# Patient Record
Sex: FEMALE | Race: WHITE | NOT HISPANIC OR LATINO | Employment: OTHER | URBAN - METROPOLITAN AREA
[De-identification: names, ages, dates, MRNs, and addresses within clinical notes are randomized per-mention and may not be internally consistent; named-entity substitution may affect disease eponyms.]

---

## 2017-04-27 ENCOUNTER — TELEMEDICINE2 (OUTPATIENT)
Dept: MEDICAL GROUP | Age: 72
End: 2017-04-27
Payer: MEDICARE

## 2017-04-27 ENCOUNTER — TELEMEDICINE ORIGINATING SITE VISIT (OUTPATIENT)
Dept: MEDICAL GROUP | Facility: CLINIC | Age: 72
End: 2017-04-27
Payer: MEDICARE

## 2017-04-27 ENCOUNTER — APPOINTMENT (OUTPATIENT)
Dept: RADIOLOGY | Facility: IMAGING CENTER | Age: 72
End: 2017-04-27
Attending: INTERNAL MEDICINE
Payer: MEDICARE

## 2017-04-27 VITALS
HEIGHT: 66 IN | HEART RATE: 62 BPM | WEIGHT: 166.7 LBS | RESPIRATION RATE: 16 BRPM | OXYGEN SATURATION: 88 % | DIASTOLIC BLOOD PRESSURE: 77 MMHG | BODY MASS INDEX: 26.79 KG/M2 | SYSTOLIC BLOOD PRESSURE: 134 MMHG | TEMPERATURE: 99.9 F

## 2017-04-27 DIAGNOSIS — E11.9 CONTROLLED TYPE 2 DIABETES MELLITUS WITHOUT COMPLICATION, WITHOUT LONG-TERM CURRENT USE OF INSULIN (HCC): ICD-10-CM

## 2017-04-27 DIAGNOSIS — Z72.0 TOBACCO ABUSE: ICD-10-CM

## 2017-04-27 DIAGNOSIS — R09.02 HYPOXEMIA: ICD-10-CM

## 2017-04-27 DIAGNOSIS — F32.9 REACTIVE DEPRESSION: ICD-10-CM

## 2017-04-27 DIAGNOSIS — I10 ESSENTIAL HYPERTENSION: ICD-10-CM

## 2017-04-27 DIAGNOSIS — I25.10 CORONARY ARTERY DISEASE INVOLVING NATIVE CORONARY ARTERY OF NATIVE HEART WITHOUT ANGINA PECTORIS: ICD-10-CM

## 2017-04-27 DIAGNOSIS — E78.5 DYSLIPIDEMIA: ICD-10-CM

## 2017-04-27 DIAGNOSIS — I73.9 PAD (PERIPHERAL ARTERY DISEASE) (HCC): ICD-10-CM

## 2017-04-27 PROCEDURE — 71020 DX-CHEST-2 VIEWS: CPT | Mod: TC | Performed by: FAMILY MEDICINE

## 2017-04-27 PROCEDURE — 99204 OFFICE O/P NEW MOD 45 MIN: CPT | Mod: GT | Performed by: INTERNAL MEDICINE

## 2017-04-27 RX ORDER — PIOGLITAZONEHYDROCHLORIDE 45 MG/1
45 TABLET ORAL DAILY
Qty: 30 TAB | Refills: 11 | Status: SHIPPED | OUTPATIENT
Start: 2017-04-27 | End: 2018-01-29 | Stop reason: SDUPTHER

## 2017-04-27 RX ORDER — CLOPIDOGREL BISULFATE 75 MG/1
TABLET ORAL
COMMUNITY
Start: 2017-04-10 | End: 2017-07-11 | Stop reason: SDUPTHER

## 2017-04-27 RX ORDER — FLUOXETINE HYDROCHLORIDE 20 MG/1
CAPSULE ORAL
COMMUNITY
Start: 2017-04-10 | End: 2018-01-29 | Stop reason: SDUPTHER

## 2017-04-27 RX ORDER — GLIPIZIDE 10 MG/1
TABLET ORAL
COMMUNITY
Start: 2017-04-10 | End: 2017-06-12 | Stop reason: SDUPTHER

## 2017-04-27 RX ORDER — PIOGLITAZONEHYDROCHLORIDE 30 MG/1
TABLET ORAL
COMMUNITY
Start: 2017-04-10 | End: 2019-06-03

## 2017-04-27 RX ORDER — BENAZEPRIL HYDROCHLORIDE 40 MG/1
TABLET ORAL
COMMUNITY
Start: 2017-04-10 | End: 2018-01-29 | Stop reason: SDUPTHER

## 2017-04-27 RX ORDER — IBUPROFEN 800 MG/1
TABLET ORAL
COMMUNITY
Start: 2017-02-28 | End: 2019-06-03

## 2017-04-27 RX ORDER — CANAGLIFLOZIN 300 MG/1
TABLET, FILM COATED ORAL
COMMUNITY
Start: 2017-04-10 | End: 2017-10-18 | Stop reason: SDUPTHER

## 2017-04-27 RX ORDER — ASPIRIN 81 MG/1
81 TABLET, CHEWABLE ORAL DAILY
COMMUNITY
End: 2021-07-15

## 2017-04-27 RX ORDER — SIMVASTATIN 20 MG
TABLET ORAL
COMMUNITY
Start: 2017-04-10 | End: 2017-07-11 | Stop reason: SDUPTHER

## 2017-04-27 ASSESSMENT — ENCOUNTER SYMPTOMS
WEIGHT LOSS: 1
CARDIOVASCULAR NEGATIVE: 1
EYES NEGATIVE: 1
GASTROINTESTINAL NEGATIVE: 1
RESPIRATORY NEGATIVE: 1
MUSCULOSKELETAL NEGATIVE: 1
NEUROLOGICAL NEGATIVE: 1
PSYCHIATRIC NEGATIVE: 1

## 2017-04-27 ASSESSMENT — PATIENT HEALTH QUESTIONNAIRE - PHQ9: CLINICAL INTERPRETATION OF PHQ2 SCORE: 0

## 2017-04-27 NOTE — PROGRESS NOTES
"Subjective:      Kimberly Castañeda is a 71 y.o. female who presents with Establish Care            HPI 71-year-old female here today to establish medical care.  Previous PCP Dr. Sood in [place]     1. Hypertension. Patient currently taking Lotensin and Lopressor. Patient has been stable on these medications for many years.    2. Dyslipidemia. Currently taking Zocor 20 mg daily. Labs from March 2 zero 17, total cholesterol 183, triglyceride 82, HDL 73, LDL 93    3. Diabetes mellitus 2. Patient is currently being treated with oral medications to include Actos, Glucophage, glipizide, Invokana. No recent medication adjustments. Hemoglobin A1c 8.6 from March 2017. Patient is trying to follow a diabetic diet. Most recent diabetic eye exam was 6 months ago which was negative for retinopathy. Patient declines treatment with insulin    4. Coronary artery disease. Status post stent placement about 10 years ago in [place]. Currently not followed by cardiology. Patient denies chest pain, shortness of breath, headaches, dizziness, orthopnea or edema. Patient stable on beta blocker, aspirin and Plavix    5. PAD. Diagnosed by PCP. Patient is treated with Plavix    6. Depression. Stable with Prozac. Patient was started on Prozac for weight loss initially to get motivated. Patient starting weight was 250 pounds 2 years ago and now she is 166 pounds.      Review of Systems   Constitutional: Positive for weight loss.        Intentional 100 pound weight loss in 2 years   HENT: Negative.    Eyes: Negative.    Respiratory: Negative.    Cardiovascular: Negative.    Gastrointestinal: Negative.    Genitourinary: Negative.    Musculoskeletal: Negative.    Neurological: Negative.    Psychiatric/Behavioral: Negative.           Objective:     /77 mmHg  Pulse 62  Temp(Src) 37.7 °C (99.9 °F)  Resp 16  Ht 1.676 m (5' 5.98\")  Wt 75.615 kg (166 lb 11.2 oz)  BMI 26.92 kg/m2  SpO2 88%     Physical Exam   Constitutional: She appears " well-developed and well-nourished.   HENT:   Head: Normocephalic and atraumatic.   Mouth/Throat: Oropharynx is clear and moist.   Eyes: Conjunctivae and EOM are normal. Pupils are equal, round, and reactive to light. No scleral icterus.   Neck: Normal range of motion. Neck supple. No thyromegaly present.   Cardiovascular: Normal rate and regular rhythm.    Murmur heard.  Chronic murmur   Pulmonary/Chest: Effort normal and breath sounds normal. No respiratory distress. She has no wheezes. She has no rales.   Abdominal: Soft. Bowel sounds are normal. She exhibits no distension.   Musculoskeletal: Normal range of motion. She exhibits no edema.   Lymphadenopathy:     She has no cervical adenopathy.   Neurological: She is alert.   Skin: Skin is warm and dry.   Psychiatric: She has a normal mood and affect.   Nursing note and vitals reviewed.    Microfilament test. Sensation intact bilaterally          Assessment/Plan:     1. Essential hypertension  Pressure stable. Continue Lotensin and Lopressor at current dose    2. Dyslipidemia  Stable. Up-to-date with lipid panel. Continue Zocor 20 mg daily. Liver function tests within normal limits    3. Controlled type 2 diabetes mellitus without complication, without long-term current use of insulin (CMS-HCC)  Hemoglobin A1c 8.6  Increase Actos to 45 mg daily. Continue all other oral medications as prescribed   Check blood sugar 1-2 times a day  Microfilament test. Sensation intact bilaterally  Patient declines insulin    - pioglitazone (ACTOS) 45 MG Tab; Take 1 Tab by mouth every day.  Dispense: 30 Tab; Refill: 11  - REFERRAL TO DIABETIC EDUCATION Diabetes Self Management Education / Training (DSME/T) and Medical Nutrition Therapy (MNT): Initial Group DSME/MNT as authorized by payor; DSME/T Content: Nutritional Management, Physical Activity    4. Coronary artery disease involving native coronary artery of native heart without angina pectoris  Stable. Continue current plan of  care    5. Reactive depression  Stable. Continue fluoxetine    6. PAD (peripheral artery disease) (CMS-[facility])  Workup reviewed. Continue aspirin and Plavix    7. Hypoxemia  Patient using 2 L of oxygen at nighttime.  Discussed the use of inhalers  Recommend smoking cessation    - PULMONARY FUNCTION TESTS Test requested: Spirometry, simple  - DX-CHEST-2 VIEWS; Future    8. Tobacco abuse   Tobacco user for many years, smoking about half pack of cigarettes a day  Offered counseling on behavioral modifications, readdress upon follow-up    - PULMONARY FUNCTION TESTS Test requested: Spirometry, simple

## 2017-04-27 NOTE — MR AVS SNAPSHOT
"        Kimberly Castañeda   2017 10:00 AM   Telemedicine2   MRN: [id number]    Department:  [address] [place] [address]   Dept Phone:  [phone]    Description:  Female : 1945   Provider:  Amaya Ibarra M.D.; [employer] [facility]           Reason for Visit     Establish Care           Allergies as of 2017     Allergen Noted Reactions    Aldactone [Kdc:Yellow Dye+Spironolactone] 2017   Rash    Full body    Percocet [Apap-Fd&C Red #40 [place]-Oxycodone] 2017   Rash    Full body      You were diagnosed with     Essential hypertension   [0356298]       Dyslipidemia   [478942]       Controlled type 2 diabetes mellitus without complication, without long-term current use of insulin (CMS-[facility])   [7649490]       Coronary artery disease involving native coronary artery of native heart without angina pectoris   [4078649]       Reactive depression   [007792]       PAD (peripheral artery disease) (CMS-[facility])   [296763]       Hypoxemia   [799.02.ICD-9-CM]       Tobacco abuse   [700461]         Vital Signs     Blood Pressure Pulse Temperature Respirations Height Weight    134/77 mmHg 62 37.7 °C (99.9 °F) 16 1.676 m (5' 5.98\") 75.615 kg (166 lb 11.2 oz)    Body Mass Index Oxygen Saturation Smoking Status             26.92 kg/m2 88% Current Every Day Smoker         Basic Information     Date Of Birth Sex Race Ethnicity Preferred Language    1945 Female White Non- English      Your appointments     2017 10:00 AM   Telemedicine Clinic Established Pt with Amaya Ibarra M.D., [employer] [address])    [address] [place] [id number]   [phone]              Problem List              ICD-10-CM Priority Class Noted - Resolved    Essential hypertension I10   2017 - Present    Dyslipidemia E78.5   2017 - Present    Controlled type 2 diabetes mellitus without complication (CMS-[facility]) E11.9   2017 - Present    Coronary artery disease involving " native coronary artery of native heart without angina pectoris I25.10   4/27/2017 - Present    Reactive depression F32.9   4/27/2017 - Present    PAD (peripheral artery disease) (CMS-[facility]) I73.9   4/27/2017 - Present    Hypoxemia R09.02   4/27/2017 - Present    Tobacco abuse Z72.0   4/27/2017 - Present      Health Maintenance     Patient has no pending health maintenance at this time      Current Immunizations     No immunizations on file.      Below and/or attached are the medications your provider expects you to take. Review all of your home medications and newly ordered medications with your provider and/or pharmacist. Follow medication instructions as directed by your provider and/or pharmacist. Please keep your medication list with you and share with your provider. Update the information when medications are discontinued, doses are changed, or new medications (including over-the-counter products) are added; and carry medication information at all times in the event of emergency situations     Allergies:  ALDACTONE - Rash     PERCOCET - Rash               Medications  Valid as of: April 27, 2017 - 10:45 AM    Generic Name Brand Name Tablet Size Instructions for use    Aspirin (Chew Tab) ASA 81 MG Take 81 mg by mouth every day.        Benazepril HCl (Tab) LOTENSIN 40 MG         Canagliflozin (Tab) INVOKANA 300 MG         Clopidogrel Bisulfate (Tab) PLAVIX 75 MG         FLUoxetine HCl (Cap) PROZAC 20 MG         GlipiZIDE (Tab) GLUCOTROL 10 MG         Ibuprofen (Tab) MOTRIN 800 MG         MetFORMIN HCl (Tab) GLUCOPHAGE 1000 MG         Metoprolol Tartrate (Tab) LOPRESSOR 25 MG         Pioglitazone HCl (Tab) ACTOS 30 MG         Simvastatin (Tab) ZOCOR 20 MG         .                 Medicines prescribed today were sent to:     [id number] [place] [address]    [address] [place] [address]    Phone: [phone] Fax: [phone]    Open 24 Hours?: No      Medication refill instructions:          If your prescription bottle indicates you have medication refills left, it is not necessary to call your provider’s office. Please contact your pharmacy and they will refill your medication.    If your prescription bottle indicates you do not have any refills left, you may request refills at any time through one of the following ways: The online [email or web address] system (except Urgent Care), by calling your provider’s office, or by asking your pharmacy to contact your provider’s office with a refill request. Medication refills are processed only during regular business hours and may not be available until the next business day. Your provider may request additional information or to have a follow-up visit with you prior to refilling your medication.   *Please Note: Medication refills are assigned a new Rx number when refilled electronically. Your pharmacy may indicate that no refills were authorized even though a new prescription for the same medication is available at the pharmacy. Please request the medicine by name with the pharmacy before contacting your provider for a refill.        Your To Do List     Future Labs/Procedures Complete By Expires    DX-CHEST-2 VIEWS  As directed 10/28/2017         [email or web address] Access Code: [email or web address]  Expires: 5/27/2017 10:45 AM    Your email address is not on file at [employer].  Email Addresses are required for you to sign up for [email or web address], please contact [phone] to verify your personal information and to provide your email address prior to attempting to register for [email or web address].    Kimberly Castañeda  [address]  [place] [address]    [email or web address]  A secure, online tool to manage your health information     [employer]’s [email or web address]® is a secure, online tool that connects you to your personalized health information from the privacy of your home -- day or night - making it very easy for you to manage your healthcare. Once the activation process is completed, you can even access your medical  information using the Aginova trini, which is available for free in the Apple Trini store or Google Play store.     To learn more about Aginova, visit [email or web address]    There are two levels of access available (as shown below):   My Chart Features  [facility] Doctor [facility]  Specialists [facility]  Urgent  Care Non-[facility] Primary Care Doctor   Email your healthcare team securely and privately 24/7 X X X    Manage appointments: schedule your next appointment; view details of past/upcoming appointments X      Request prescription refills. X      View recent personal medical records, including lab and immunizations X X X X   View health record, including health history, allergies, medications X X X X   Read reports about your outpatient visits, procedures, consult and ER notes X X X X   See your discharge summary, which is a recap of your hospital and/or ER visit that includes your diagnosis, lab results, and care plan X X  X     How to register for Aginova:  Once your e-mail address has been verified, follow the following steps to sign up for Aginova.     1. Go to  [email or web address]  2. Click on the Sign Up Now box, which takes you to the New Member Sign Up page. You will need to provide the following information:  a. Enter your Aginova Access Code exactly as it appears at the top of this page. (You will not need to use this code after you’ve completed the sign-up process. If you do not sign up before the expiration date, you must request a new code.)   b. Enter your date of birth.   c. Enter your home email address.   d. Click Submit, and follow the next screen’s instructions.  3. Create a Aginova ID. This will be your Aginova login ID and cannot be changed, so think of one that is secure and easy to remember.  4. Create a Aginova password. You can change your password at any time.  5. Enter your Password Reset Question and Answer. This can be used at a later time if you forget your password.    6. Enter your e-mail address. This allows you to receive e-mail notifications when new information is available in [email or web address].  7. Click Sign Up. You can now view your health information.    For assistance activating your [email or web address] account, call [phone]         Quit Tobacco Information     Do you want to quit using tobacco?    Quitting tobacco decreases risks of cancer, heart and lung disease, increases life expectancy, improves sense of taste and smell, and increases spending money, among other benefits.    If you are thinking about quitting, we can help.  • [facility] Quit Tobacco Program: 528.134.1345  o Program occurs weekly for four weeks and includes pharmacist consultation on products to support quitting smoking or chewing tobacco. A provider referral is needed for pharmacist consultation.  • Tobacco Users Help Hotline: 800QUIT-NOW (121-4393) or https://[place].quitlogix.org/  o Free, confidential telephone and online coaching for [place] residents. Sessions are designed on a schedule that is convenient for you. Eligible clients receive free nicotine replacement therapy.  • Nationally: www.smokefree.gov  o Information and professional assistance to support both immediate and long-term needs as you become, and remain, a non-smoker. Smokefree.gov allows you to choose the help that best fits your needs.

## 2017-05-01 ENCOUNTER — NON-PROVIDER VISIT (OUTPATIENT)
Dept: MEDICAL GROUP | Facility: CLINIC | Age: 72
End: 2017-05-01
Payer: MEDICARE

## 2017-05-01 DIAGNOSIS — R09.02 HYPOXEMIA: ICD-10-CM

## 2017-05-01 PROCEDURE — 94010 BREATHING CAPACITY TEST: CPT | Mod: TC | Performed by: FAMILY MEDICINE

## 2017-05-01 NOTE — PROGRESS NOTES
Kimberly Castañeda is a 71 y.o. female here for a non-provider visit for PFT    If abnormal was an in office provider notified today (if so, indicate provider)? Yes  Routed to PCP? Yes

## 2017-05-01 NOTE — MR AVS SNAPSHOT
Kimberly Castañeda   2017 8:00 AM   Non-Provider Visit   MRN: [id number]    Department:  [facility]   Dept Phone:  [phone]    Description:  Female : 1945   Provider:  J[place] LEIVA           Reason for Visit     Shortness of Breath           Allergies as of 2017     Allergen Noted Reactions    Aldactone [Kdc:Yellow Dye+Spironolactone] 2017   Rash    Full body    Percocet [Apap-Fd&C Red #40 [place]-Oxycodone] 2017   Rash    Full body      You were diagnosed with     Hypoxemia   [799.02.ICD-9-CM]         Vital Signs     Smoking Status                   Current Every Day Smoker           Basic Information     Date Of Birth Sex Race Ethnicity Preferred Language    1945 Female White Non- English      Your appointments     2017 10:00 AM   Telemedicine Clinic Established Pt with Amaya Ibarra M.D., [facility] [address] [facility]    [facility] [id number] [phone]              Problem List              ICD-10-CM Priority Class Noted - Resolved    Essential hypertension I10   2017 - Present    Dyslipidemia E78.5   2017 - Present    Controlled type 2 diabetes mellitus without complication (CMS-HCC) E11.9   2017 - Present    Coronary artery disease involving native coronary artery of native heart without angina pectoris I25.10   2017 - Present    Reactive depression F32.9   2017 - Present    PAD (peripheral artery disease) (CMS-HCC) I73.9   2017 - Present    Hypoxemia R09.02   2017 - Present    Tobacco abuse Z72.0   2017 - Present      Health Maintenance        Date Due Completion Dates    URINE ACR / MICROALBUMIN 1961 ---    IMM DTaP/Tdap/Td Vaccine (1 - Tdap) 1962 ---    PAP SMEAR 1964 ---    IMM ZOSTER VACCINE 2003 ---    BONE DENSITY 2008 ---    IMM PNEUMOCOCCAL 65+ (ADULT) LOW/MEDIUM RISK SERIES (1 of 2 - PCV13) 2008 ---    MAMMOGRAM  5/18/2013 5/18/2012 (Done)    Override on 5/18/2012: Done (benign)    A1C SCREENING 9/17/2017 3/17/2017 (Done)    Override on 3/17/2017: Done (8.6)    RETINAL SCREENING 10/10/2017 10/10/2016 (Done)    Override on 10/10/2016: Done    FASTING LIPID PROFILE 3/24/2018 3/24/2017 (Done)    Override on 3/24/2017: Done    SERUM CREATININE 3/24/2018 3/24/2017 (Done)    Override on 3/24/2017: Done    DIABETES MONOFILAMENT / LE EXAM 4/27/2018 4/27/2017 (Done)    Override on 4/27/2017: Done    COLONOSCOPY 4/27/2021 4/27/2011 (Done)    Override on 4/27/2011: Done (clear per atient)            Current Immunizations     No immunizations on file.      Below and/or attached are the medications your provider expects you to take. Review all of your home medications and newly ordered medications with your provider and/or pharmacist. Follow medication instructions as directed by your provider and/or pharmacist. Please keep your medication list with you and share with your provider. Update the information when medications are discontinued, doses are changed, or new medications (including over-the-counter products) are added; and carry medication information at all times in the event of emergency situations     Allergies:  ALDACTONE - Rash     PERCOCET - Rash               Medications  Valid as of: May 01, 2017 - 11:01 AM    Generic Name Brand Name Tablet Size Instructions for use    Aspirin (Chew Tab) ASA 81 MG Take 81 mg by mouth every day.        Benazepril HCl (Tab) LOTENSIN 40 MG         Canagliflozin (Tab) INVOKANA 300 MG         Clopidogrel Bisulfate (Tab) PLAVIX 75 MG         FLUoxetine HCl (Cap) PROZAC 20 MG         GlipiZIDE (Tab) GLUCOTROL 10 MG         Ibuprofen (Tab) MOTRIN 800 MG         MetFORMIN HCl (Tab) GLUCOPHAGE 1000 MG         Metoprolol Tartrate (Tab) LOPRESSOR 25 MG         Pioglitazone HCl (Tab) ACTOS 30 MG         Pioglitazone HCl (Tab) ACTOS 45 MG Take 1 Tab by mouth every day.        Simvastatin (Tab) ZOCOR 20  MG         .                 Medicines prescribed today were sent to:     [id number] [place] [place] - [address]    [address] [place] [address]    Phone: [phone] Fax: [phone]    Open 24 Hours?: No      Medication refill instructions:       If your prescription bottle indicates you have medication refills left, it is not necessary to call your provider’s office. Please contact your pharmacy and they will refill your medication.    If your prescription bottle indicates you do not have any refills left, you may request refills at any time through one of the following ways: The online The Medical Memory system (except Urgent Care), by calling your provider’s office, or by asking your pharmacy to contact your provider’s office with a refill request. Medication refills are processed only during regular business hours and may not be available until the next business day. Your provider may request additional information or to have a follow-up visit with you prior to refilling your medication.   *Please Note: Medication refills are assigned a new Rx number when refilled electronically. Your pharmacy may indicate that no refills were authorized even though a new prescription for the same medication is available at the pharmacy. Please request the medicine by name with the pharmacy before contacting your provider for a refill.           The Medical Memory Access Code: [email or web address]  Expires: 5/27/2017 10:45 AM    The Medical Memory  A secure, online tool to manage your health information     [employer]’s The Medical Memory® is a secure, online tool that connects you to your personalized health information from the privacy of your home -- day or night - making it very easy for you to manage your healthcare. Once the activation process is completed, you can even access your medical information using the The Medical Memory trini, which is available for free in the Apple Trini store or Google Play store.     The Medical Memory provides the following levels of  access (as shown below):   My Chart Features   [facility] Doctor [facility]  Specialists [facility]  Urgent  Care Non-[facility]  Primary Care  Doctor   Email your healthcare team securely and privately 24/7 X X X    Manage appointments: schedule your next appointment; view details of past/upcoming appointments X      Request prescription refills. X      View recent personal medical records, including lab and immunizations X X X X   View health record, including health history, allergies, medications X X X X   Read reports about your outpatient visits, procedures, consult and ER notes X X X X   See your discharge summary, which is a recap of your hospital and/or ER visit that includes your diagnosis, lab results, and care plan. X X       How to register for Jovie:  1. Go to  [email or web address].  2. Click on the Sign Up Now box, which takes you to the New Member Sign Up page. You will need to provide the following information:  a. Enter your Jovie Access Code exactly as it appears at the top of this page. (You will not need to use this code after you’ve completed the sign-up process. If you do not sign up before the expiration date, you must request a new code.)   b. Enter your date of birth.   c. Enter your home email address.   d. Click Submit, and follow the next screen’s instructions.  3. Create a Jovie ID. This will be your Jovie login ID and cannot be changed, so think of one that is secure and easy to remember.  4. Create a Jovie password. You can change your password at any time.  5. Enter your Password Reset Question and Answer. This can be used at a later time if you forget your password.   6. Enter your e-mail address. This allows you to receive e-mail notifications when new information is available in Jovie.  7. Click Sign Up. You can now view your health information.    For assistance activating your Jovie account, call [phone]        Quit Tobacco Information     Do you want to  quit using tobacco?    Quitting tobacco decreases risks of cancer, heart and lung disease, increases life expectancy, improves sense of taste and smell, and increases spending money, among other benefits.    If you are thinking about quitting, we can help.  • [facility] Quit Tobacco Program: 130.703.8882  o Program occurs weekly for four weeks and includes pharmacist consultation on products to support quitting smoking or chewing tobacco. A provider referral is needed for pharmacist consultation.  • Tobacco Users Help Hotline: 6-855-QUIT-NOW (402-1026) or https://[place].quitlogix.org/  o Free, confidential telephone and online coaching for [place] residents. Sessions are designed on a schedule that is convenient for you. Eligible clients receive free nicotine replacement therapy.  • Nationally: www.smokefree.gov  o Information and professional assistance to support both immediate and long-term needs as you become, and remain, a non-smoker. Smokefree.gov allows you to choose the help that best fits your needs.

## 2017-06-01 ENCOUNTER — NON-PROVIDER VISIT (OUTPATIENT)
Dept: MEDICAL GROUP | Facility: CLINIC | Age: 72
End: 2017-06-01
Payer: MEDICARE

## 2017-06-01 ENCOUNTER — TELEMEDICINE ORIGINATING SITE VISIT (OUTPATIENT)
Dept: MEDICAL GROUP | Facility: CLINIC | Age: 72
End: 2017-06-01
Payer: MEDICARE

## 2017-06-01 ENCOUNTER — TELEMEDICINE2 (OUTPATIENT)
Dept: MEDICAL GROUP | Age: 72
End: 2017-06-01
Payer: MEDICARE

## 2017-06-01 VITALS
TEMPERATURE: 98.9 F | DIASTOLIC BLOOD PRESSURE: 63 MMHG | WEIGHT: 160 LBS | HEART RATE: 66 BPM | SYSTOLIC BLOOD PRESSURE: 113 MMHG | RESPIRATION RATE: 16 BRPM | OXYGEN SATURATION: 84 % | BODY MASS INDEX: 25.71 KG/M2 | HEIGHT: 66 IN

## 2017-06-01 DIAGNOSIS — K62.5 BRBPR (BRIGHT RED BLOOD PER RECTUM): ICD-10-CM

## 2017-06-01 DIAGNOSIS — R73.9 HYPERGLYCEMIA: ICD-10-CM

## 2017-06-01 DIAGNOSIS — R09.02 HYPOXEMIA: ICD-10-CM

## 2017-06-01 DIAGNOSIS — I10 ESSENTIAL HYPERTENSION: ICD-10-CM

## 2017-06-01 DIAGNOSIS — E11.9 CONTROLLED TYPE 2 DIABETES MELLITUS WITHOUT COMPLICATION, WITHOUT LONG-TERM CURRENT USE OF INSULIN (HCC): ICD-10-CM

## 2017-06-01 DIAGNOSIS — Z98.890 H/O COLONOSCOPY WITH POLYPECTOMY: ICD-10-CM

## 2017-06-01 DIAGNOSIS — Z86.010 H/O COLONOSCOPY WITH POLYPECTOMY: ICD-10-CM

## 2017-06-01 DIAGNOSIS — I25.10 CORONARY ARTERY DISEASE INVOLVING NATIVE CORONARY ARTERY OF NATIVE HEART WITHOUT ANGINA PECTORIS: ICD-10-CM

## 2017-06-01 PROBLEM — Z86.0100 H/O COLONOSCOPY WITH POLYPECTOMY: Status: ACTIVE | Noted: 2017-06-01

## 2017-06-01 LAB
HBA1C MFR BLD: 8.6 % (ref ?–5.8)
INT CON NEG: NEGATIVE
INT CON POS: POSITIVE

## 2017-06-01 PROCEDURE — 99214 OFFICE O/P EST MOD 30 MIN: CPT | Mod: GT | Performed by: INTERNAL MEDICINE

## 2017-06-01 ASSESSMENT — PATIENT HEALTH QUESTIONNAIRE - PHQ9: CLINICAL INTERPRETATION OF PHQ2 SCORE: 0

## 2017-06-01 NOTE — PROGRESS NOTES
Kimberly Castañeda is a 71 y.o. female here for a non-provider visit for A1C    If abnormal was an in office provider notified today (if so, indicate provider)? Yes  Routed to PCP? Yes

## 2017-06-01 NOTE — MR AVS SNAPSHOT
Kimberly Castañeda   2017 10:45 AM   Non-Provider Visit   MRN: [id number]    Department:  [facility]   Dept Phone:  [phone]    Description:  Female : 1945   Provider:  JAVIER LEIVA           Reason for Visit     Hyperglycemia           Allergies as of 2017     Allergen Noted Reactions    Aldactone [Kdc:Yellow Dye+Spironolactone] 2017   Rash    Full body    Percocet [Apap-Fd&C Red #40 [place]-Oxycodone] 2017   Rash    Full body      You were diagnosed with     Hyperglycemia   [714912]         Vital Signs     Smoking Status                   Current Every Day Smoker           Basic Information     Date Of Birth Sex Race Ethnicity Preferred Language    1945 Female White Non- English      Your appointments     2017 10:45 AM   Non Provider 1 with JAVIER LEIVA   [facility] SERVICES (--)    [address] [place] [id number] [phone]           You will be receiving a confirmation call a few days before your appointment from our automated call confirmation system.              Problem List              ICD-10-CM Priority Class Noted - Resolved    Essential hypertension I10   2017 - Present    Dyslipidemia E78.5   2017 - Present    Controlled type 2 diabetes mellitus without complication (CMS-HCC) E11.9   2017 - Present    Coronary artery disease involving native coronary artery of native heart without angina pectoris I25.10   2017 - Present    Reactive depression F32.9   2017 - Present    PAD (peripheral artery disease) (CMS-HCC) I73.9   2017 - Present    Hypoxemia R09.02   2017 - Present    Tobacco abuse Z72.0   2017 - Present      Health Maintenance        Date Due Completion Dates    URINE ACR / MICROALBUMIN 1961 ---    IMM DTaP/Tdap/Td Vaccine (1 - Tdap) 1962 ---    PAP SMEAR 1964 ---    IMM ZOSTER VACCINE 2003 ---    BONE DENSITY 2008 ---    IMM PNEUMOCOCCAL 65+ (ADULT) LOW/MEDIUM  RISK SERIES (1 of 2 - PCV13) 11/9/2008 ---    MAMMOGRAM 5/18/2013 5/18/2012 (Done)    Override on 5/18/2012: Done (benign)    A1C SCREENING 9/17/2017 3/17/2017 (Done)    Override on 3/17/2017: Done (8.6)    RETINAL SCREENING 10/10/2017 10/10/2016 (Done)    Override on 10/10/2016: Done    FASTING LIPID PROFILE 3/24/2018 3/24/2017 (Done)    Override on 3/24/2017: Done    SERUM CREATININE 3/24/2018 3/24/2017 (Done)    Override on 3/24/2017: Done    DIABETES MONOFILAMENT / LE EXAM 4/27/2018 4/27/2017 (Done)    Override on 4/27/2017: Done    COLONOSCOPY 4/27/2021 4/27/2011 (Done)    Override on 4/27/2011: Done (clear per atient)            Results     POCT A1C      Component    Glycohemoglobin    8.6    Comment:     QC passed.    Internal Control Negative    Negative    Internal Control Positive    Positive                        Current Immunizations     No immunizations on file.      Below and/or attached are the medications your provider expects you to take. Review all of your home medications and newly ordered medications with your provider and/or pharmacist. Follow medication instructions as directed by your provider and/or pharmacist. Please keep your medication list with you and share with your provider. Update the information when medications are discontinued, doses are changed, or new medications (including over-the-counter products) are added; and carry medication information at all times in the event of emergency situations     Allergies:  ALDACTONE - Rash     PERCOCET - Rash               Medications  Valid as of: June 01, 2017 - 10:37 AM    Generic Name Brand Name Tablet Size Instructions for use    Aspirin (Chew Tab) ASA 81 MG Take 81 mg by mouth every day.        Benazepril HCl (Tab) LOTENSIN 40 MG         Canagliflozin (Tab) INVOKANA 300 MG         Clopidogrel Bisulfate (Tab) PLAVIX 75 MG         FLUoxetine HCl (Cap) PROZAC 20 MG         GlipiZIDE (Tab) GLUCOTROL 10 MG         Ibuprofen (Tab) MOTRIN 800  MG         MetFORMIN HCl (Tab) GLUCOPHAGE 1000 MG         Metoprolol Tartrate (Tab) LOPRESSOR 25 MG         Pioglitazone HCl (Tab) ACTOS 30 MG         Pioglitazone HCl (Tab) ACTOS 45 MG Take 1 Tab by mouth every day.        Simvastatin (Tab) ZOCOR 20 MG         .                 Medicines prescribed today were sent to:     [id number] - [place], [place] - [address]    [address] [place] [address]    Phone: [phone] Fax: [phone]    Open 24 Hours?: No      Medication refill instructions:       If your prescription bottle indicates you have medication refills left, it is not necessary to call your provider’s office. Please contact your pharmacy and they will refill your medication.    If your prescription bottle indicates you do not have any refills left, you may request refills at any time through one of the following ways: The online [employer] system (except Urgent Care), by calling your provider’s office, or by asking your pharmacy to contact your provider’s office with a refill request. Medication refills are processed only during regular business hours and may not be available until the next business day. Your provider may request additional information or to have a follow-up visit with you prior to refilling your medication.   *Please Note: Medication refills are assigned a new Rx number when refilled electronically. Your pharmacy may indicate that no refills were authorized even though a new prescription for the same medication is available at the pharmacy. Please request the medicine by name with the pharmacy before contacting your provider for a refill.           MyChart Status: Patient Declined        Quit Tobacco Information     Do you want to quit using tobacco?    Quitting tobacco decreases risks of cancer, heart and lung disease, increases life expectancy, improves sense of taste and smell, and increases spending money, among other benefits.    If you are thinking about quitting,  we can help.  • [facility] Quit Tobacco Program: 641.189.1484  o Program occurs weekly for four weeks and includes pharmacist consultation on products to support quitting smoking or chewing tobacco. A provider referral is needed for pharmacist consultation.  • Tobacco Users Help Hotline: 0-045-QUIT-NOW (137-6855) or https://[place].quitlogix.org/  o Free, confidential telephone and online coaching for [place] residents. Sessions are designed on a schedule that is convenient for you. Eligible clients receive free nicotine replacement therapy.  • Nationally: www.smokefree.gov  o Information and professional assistance to support both immediate and long-term needs as you become, and remain, a non-smoker. Smokefree.gov allows you to choose the help that best fits your needs.

## 2017-06-01 NOTE — MR AVS SNAPSHOT
"        Kimberly Castañeda   2017 10:00 AM   Telemedicine2   MRN: [id number]    Department:  [address] [place] [address]   Dept Phone:  [phone]    Description:  Female : 1945   Provider:  Amaya Ibarra M.D.; [facility]           Reason for Visit     Follow-Up PFT      Allergies as of 2017     Allergen Noted Reactions    Aldactone [Kdc:Yellow Dye+Spironolactone] 2017   Rash    Full body    Percocet [Apap-Fd&C Red #40 [place]-Oxycodone] 2017   Rash    Full body      Vital Signs     Blood Pressure Pulse Temperature Respirations Height Weight    113/63 mmHg 66 37.2 °C (98.9 °F) 16 1.676 m (5' 6\") 72.576 kg (160 lb)    Body Mass Index Oxygen Saturation Smoking Status             25.84 kg/m2 84% Current Every Day Smoker         Basic Information     Date Of Birth Sex Race Ethnicity Preferred Language    1945 Female White Non- English      Your appointments     2017 10:45 AM   Non Provider 1 with [facility] (--)    [address] [facility] [address]   [phone]           You will be receiving a confirmation call a few days before your appointment from our automated call confirmation system.              Problem List              ICD-10-CM Priority Class Noted - Resolved    Essential hypertension I10   2017 - Present    Dyslipidemia E78.5   2017 - Present    Controlled type 2 diabetes mellitus without complication (CMS-HCC) E11.9   2017 - Present    Coronary artery disease involving native coronary artery of native heart without angina pectoris I25.10   2017 - Present    Reactive depression F32.9   2017 - Present    PAD (peripheral artery disease) (CMS-HCC) I73.9   2017 - Present    Hypoxemia R09.02   2017 - Present    Tobacco abuse Z72.0   2017 - Present      Health Maintenance        Date Due Completion Dates    URINE ACR / MICROALBUMIN 1961 ---    IMM DTaP/Tdap/Td Vaccine (1 - Tdap) 1962 ---   "    PAP SMEAR 11/9/1964 ---    IMM ZOSTER VACCINE 11/9/2003 ---    BONE DENSITY 11/9/2008 ---    IMM PNEUMOCOCCAL 65+ (ADULT) LOW/MEDIUM RISK SERIES (1 of 2 - PCV13) 11/9/2008 ---    MAMMOGRAM 5/18/2013 5/18/2012 (Done)    Override on 5/18/2012: Done (benign)    A1C SCREENING 9/17/2017 3/17/2017 (Done)    Override on 3/17/2017: Done (8.6)    RETINAL SCREENING 10/10/2017 10/10/2016 (Done)    Override on 10/10/2016: Done    FASTING LIPID PROFILE 3/24/2018 3/24/2017 (Done)    Override on 3/24/2017: Done    SERUM CREATININE 3/24/2018 3/24/2017 (Done)    Override on 3/24/2017: Done    DIABETES MONOFILAMENT / LE EXAM 4/27/2018 4/27/2017 (Done)    Override on 4/27/2017: Done    COLONOSCOPY 4/27/2021 4/27/2011 (Done)    Override on 4/27/2011: Done (clear per atient)            Current Immunizations     No immunizations on file.      Below and/or attached are the medications your provider expects you to take. Review all of your home medications and newly ordered medications with your provider and/or pharmacist. Follow medication instructions as directed by your provider and/or pharmacist. Please keep your medication list with you and share with your provider. Update the information when medications are discontinued, doses are changed, or new medications (including over-the-counter products) are added; and carry medication information at all times in the event of emergency situations     Allergies:  ALDACTONE - Rash     PERCOCET - Rash               Medications  Valid as of: June 01, 2017 - 10:39 AM    Generic Name Brand Name Tablet Size Instructions for use    Aspirin (Chew Tab) ASA 81 MG Take 81 mg by mouth every day.        Benazepril HCl (Tab) LOTENSIN 40 MG         Canagliflozin (Tab) INVOKANA 300 MG         Clopidogrel Bisulfate (Tab) PLAVIX 75 MG         FLUoxetine HCl (Cap) PROZAC 20 MG         GlipiZIDE (Tab) GLUCOTROL 10 MG         Ibuprofen (Tab) MOTRIN 800 MG         MetFORMIN HCl (Tab) GLUCOPHAGE 1000 MG          Metoprolol Tartrate (Tab) LOPRESSOR 25 MG         Pioglitazone HCl (Tab) ACTOS 30 MG         Pioglitazone HCl (Tab) ACTOS 45 MG Take 1 Tab by mouth every day.        Simvastatin (Tab) ZOCOR 20 MG         .                 Medicines prescribed today were sent to:     [id number] - [place], [place] - [address]    [address] [place] [address]    Phone: [phone] Fax: [phone]    Open 24 Hours?: No      Medication refill instructions:       If your prescription bottle indicates you have medication refills left, it is not necessary to call your provider’s office. Please contact your pharmacy and they will refill your medication.    If your prescription bottle indicates you do not have any refills left, you may request refills at any time through one of the following ways: The online [employer] system (except Urgent Care), by calling your provider’s office, or by asking your pharmacy to contact your provider’s office with a refill request. Medication refills are processed only during regular business hours and may not be available until the next business day. Your provider may request additional information or to have a follow-up visit with you prior to refilling your medication.   *Please Note: Medication refills are assigned a new Rx number when refilled electronically. Your pharmacy may indicate that no refills were authorized even though a new prescription for the same medication is available at the pharmacy. Please request the medicine by name with the pharmacy before contacting your provider for a refill.           MyChart Status: Patient Declined        Quit Tobacco Information     Do you want to quit using tobacco?    Quitting tobacco decreases risks of cancer, heart and lung disease, increases life expectancy, improves sense of taste and smell, and increases spending money, among other benefits.    If you are thinking about quitting, we can help.  • [facility] Quit Tobacco Program:  433.932.9524  o Program occurs weekly for four weeks and includes pharmacist consultation on products to support quitting smoking or chewing tobacco. A provider referral is needed for pharmacist consultation.  • Tobacco Users Help Hotline: 3-800QUIT-NOW (218-0825) or https://[place].quitlogix.org/  o Free, confidential telephone and online coaching for [place] residents. Sessions are designed on a schedule that is convenient for you. Eligible clients receive free nicotine replacement therapy.  • Nationally: www.smokefree.gov  o Information and professional assistance to support both immediate and long-term needs as you become, and remain, a non-smoker. Smokefree.gov allows you to choose the help that best fits your needs.

## 2017-06-05 NOTE — PROGRESS NOTES
CC: Follow-up pulmonary function test, diabetes    Verified identification with patient. Secured video conference with RN presenter in [facility]      HPI:   Kimberly presents today with the following.    1. Controlled type 2 diabetes mellitus without complication, without long-term current use of insulin (CMS-[facility])  Patient is currently treated with Actos 30 mg daily, Glucophage 1000 mg, 2-1/2 Daily, glipizide 10 mg daily, Invokana 300 mg daily. Patient's blood sugar has improved and is averaging in the 130s to 140s. Patient has increased her exercise and has lost pounds since her last visit. Patient declines insulin. Hemoglobin A1c today is 8.6, unchanged from last visit.     2. Coronary artery disease involving native coronary artery of native heart without angina pectoris  Status post stent placement about 10 years ago in [place]. Currently not followed by cardiology. Patient denies chest pain, shortness of breath, headaches, dizziness, orthopnea or edema. Patient stable on beta blocker, aspirin and Plavix    3. BRBPR (bright red blood per rectum)  Patient noted an episode of bright red blood per rectum on May 25 and one on May 26. Patient has a history of a colon polyp, diagnosed about 4-5 years ago. History of hemorrhoids are positive. Patient denies rectal pain, melena or prior episode.    4. Hypoxemia  Patient has been using nighttime oxygen for over 5 years unknown diagnosis. Recent chest x-ray was negative for any cardiopulmonary abnormalities. Pulmonary function test/spirometry shows moderate to severe restrictive pattern. Patient O2 sat is 84% on room air, off of oxygen for the last 5-6 hours. Patient believes oxygen was initially prescribed by her cardiologist. Patient has not had an overnight pulse oximetry test completed. Patient is currently not using any inhalers, does not complain of shortness of breath or ANDERSON. Patient has never been evaluated by a pulmonologist. Patient is a smoker for over 50  "years. Patient has been counseling on smoking cessation.      Patient Active Problem List    Diagnosis Date Noted   • BRBPR (bright red blood per rectum) 06/01/2017   • H/O colonoscopy with polypectomy 06/01/2017   • Essential hypertension 04/27/2017   • Dyslipidemia 04/27/2017   • Controlled type 2 diabetes mellitus without complication ([facility]) 04/27/2017   • Coronary artery disease involving native coronary artery of native heart without angina pectoris 04/27/2017   • Reactive depression 04/27/2017   • PAD (peripheral artery disease) (CMS-HCC) 04/27/2017   • Hypoxemia 04/27/2017   • Tobacco abuse 04/27/2017       Current Outpatient Prescriptions   Medication Sig Dispense Refill   • benazepril (LOTENSIN) 40 MG tablet      • simvastatin (ZOCOR) 20 MG Tab      • pioglitazone (ACTOS) 30 MG Tab      • metoprolol (LOPRESSOR) 25 MG Tab      • metformin (GLUCOPHAGE) 1000 MG tablet      • ibuprofen (MOTRIN) 800 MG Tab      • glipiZIDE (GLUCOTROL) 10 MG Tab      • fluoxetine (PROZAC) 20 MG Cap      • clopidogrel (PLAVIX) 75 MG Tab      • INVOKANA 300 MG Tab      • aspirin (ASA) 81 MG Chew Tab chewable tablet Take 81 mg by mouth every day.     • pioglitazone (ACTOS) 45 MG Tab Take 1 Tab by mouth every day. 30 Tab 11     No current facility-administered medications for this visit.         Allergies as of 06/01/2017 - [place] as Reviewed 06/01/2017   Allergen Reaction Noted   • Aldactone [kdc:yellow dye+spironolactone] Rash 04/27/2017   • Percocet [apap-fd&c red #40 [place]-oxycodone] Rash 04/27/2017        ROS: As per HPI. All other cardiopulmonary, GI, neurologic symptoms negative    /63 mmHg  Pulse 66  Temp(Src) 37.2 °C (98.9 °F)  Resp 16  Ht 1.676 m (5' 6\")  Wt 72.576 kg (160 lb)  BMI 25.84 kg/m2  SpO2 84%    Physical Exam:  Gen:         Alert and oriented, No apparent distress.  Neck:        No Lymphadenopathy or Bruits.  Lungs:     Clear to auscultation bilaterally, no wheezes rhonchi or crackles  CV:         "  Regular rate and rhythm. + murmur. S1 and S2 heard  Abd:         Soft non tender, non distended. Normal active bowel sounds.  No  Hepatosplenomegaly, No pulsatile masses.                   Ext:          No clubbing, cyanosis, edema.      Assessment and Plan.   71 y.o. female with the following issues.    1. Controlled type 2 diabetes mellitus without complication, without long-term current use of insulin (CMS-HCC)  Patient to continue current plan of care  Focus and diet and lifestyle modifications      2. Coronary artery disease involving native coronary artery of native heart without angina pectoris  Stable continue current plan of care at this time    3. BRBPR (bright red blood per rectum)    - OCCULT BLOOD FECES IMMUNOASSAY; Future    5. Hypoxemia  Results reviewed with patient.  Referral to pulmonology for evaluation and treatment  Patient to use oxygen 24 7 at this time.  Discussed overnight pulse oximetry    - REFERRAL TO PULMONOLOGY            Please note that this dictation was created using voice recognition software. I have made every reasonable attempt to correct obvious errors, but expect that there are errors of grammar and possible content that I did not discover before finalizing note.

## 2017-06-12 DIAGNOSIS — E11.9 CONTROLLED TYPE 2 DIABETES MELLITUS WITHOUT COMPLICATION, WITHOUT LONG-TERM CURRENT USE OF INSULIN (HCC): ICD-10-CM

## 2017-06-12 RX ORDER — GLIPIZIDE 10 MG/1
10 TABLET ORAL 2 TIMES DAILY
Qty: 60 TAB | Refills: 5 | Status: SHIPPED | OUTPATIENT
Start: 2017-06-12 | End: 2017-07-11 | Stop reason: SDUPTHER

## 2017-06-12 RX ORDER — GLIPIZIDE 10 MG/1
TABLET ORAL
Qty: 60 TAB | Status: CANCELLED | OUTPATIENT
Start: 2017-06-12

## 2017-06-12 NOTE — TELEPHONE ENCOUNTER
Was the patient seen in the last year in this department? Yes     Does patient have an active prescription for medications requested? No     Received Request Via: Patient     Patient is requesting refill of Glipizide 10mg 1 po qd be sent to [facility]

## 2017-06-19 ENCOUNTER — HOSPITAL ENCOUNTER (OUTPATIENT)
Facility: MEDICAL CENTER | Age: 72
End: 2017-06-19
Attending: INTERNAL MEDICINE
Payer: MEDICARE

## 2017-06-19 PROCEDURE — 82274 ASSAY TEST FOR BLOOD FECAL: CPT

## 2017-06-22 DIAGNOSIS — K62.5 BRBPR (BRIGHT RED BLOOD PER RECTUM): ICD-10-CM

## 2017-06-22 DIAGNOSIS — Z98.890 H/O COLONOSCOPY WITH POLYPECTOMY: ICD-10-CM

## 2017-06-22 DIAGNOSIS — Z86.010 H/O COLONOSCOPY WITH POLYPECTOMY: ICD-10-CM

## 2017-06-23 LAB — HEMOCCULT STL QL IA: POSITIVE

## 2017-06-28 ENCOUNTER — TELEPHONE (OUTPATIENT)
Dept: MEDICAL GROUP | Facility: CLINIC | Age: 72
End: 2017-06-28

## 2017-06-28 NOTE — TELEPHONE ENCOUNTER
Patient had a positive stool test for blood on FIT test. Please obtain most recent colonoscopy report. Thanks

## 2017-07-11 DIAGNOSIS — E11.9 CONTROLLED TYPE 2 DIABETES MELLITUS WITHOUT COMPLICATION, WITHOUT LONG-TERM CURRENT USE OF INSULIN (HCC): ICD-10-CM

## 2017-07-11 DIAGNOSIS — I25.10 CORONARY ARTERY DISEASE INVOLVING NATIVE CORONARY ARTERY OF NATIVE HEART WITHOUT ANGINA PECTORIS: ICD-10-CM

## 2017-07-11 DIAGNOSIS — E78.5 DYSLIPIDEMIA: ICD-10-CM

## 2017-07-11 RX ORDER — GLIPIZIDE 10 MG/1
10 TABLET ORAL 2 TIMES DAILY
Qty: 60 TAB | Refills: 5 | Status: SHIPPED | OUTPATIENT
Start: 2017-07-11 | End: 2018-01-29 | Stop reason: SDUPTHER

## 2017-07-11 RX ORDER — SIMVASTATIN 20 MG
20 TABLET ORAL EVERY EVENING
Qty: 30 TAB | Refills: 5 | Status: SHIPPED | OUTPATIENT
Start: 2017-07-11 | End: 2018-01-29 | Stop reason: SDUPTHER

## 2017-07-11 RX ORDER — SIMVASTATIN 20 MG
TABLET ORAL
Qty: 30 TAB | Status: CANCELLED | OUTPATIENT
Start: 2017-07-11

## 2017-07-11 RX ORDER — GLIPIZIDE 10 MG/1
10 TABLET ORAL 2 TIMES DAILY
Qty: 60 TAB | Refills: 5 | Status: CANCELLED | OUTPATIENT
Start: 2017-07-11

## 2017-07-11 RX ORDER — CLOPIDOGREL BISULFATE 75 MG/1
75 TABLET ORAL DAILY
Qty: 30 TAB | Refills: 5 | Status: SHIPPED | OUTPATIENT
Start: 2017-07-11 | End: 2018-01-29 | Stop reason: SDUPTHER

## 2017-07-11 RX ORDER — CLOPIDOGREL BISULFATE 75 MG/1
TABLET ORAL
Qty: 30 TAB | Status: CANCELLED | OUTPATIENT
Start: 2017-07-11

## 2017-08-14 ENCOUNTER — TELEMEDICINE2 (OUTPATIENT)
Dept: SCHEDULING | Facility: IMAGING CENTER | Age: 72
End: 2017-08-14
Payer: MEDICARE

## 2017-08-14 ENCOUNTER — TELEMEDICINE ORIGINATING SITE VISIT (OUTPATIENT)
Dept: MEDICAL GROUP | Facility: CLINIC | Age: 72
End: 2017-08-14
Payer: MEDICARE

## 2017-08-14 ENCOUNTER — TELEPHONE (OUTPATIENT)
Dept: MEDICAL GROUP | Facility: CLINIC | Age: 72
End: 2017-08-14

## 2017-08-14 VITALS
TEMPERATURE: 98.8 F | RESPIRATION RATE: 16 BRPM | SYSTOLIC BLOOD PRESSURE: 121 MMHG | BODY MASS INDEX: 25.23 KG/M2 | HEART RATE: 63 BPM | HEIGHT: 66 IN | WEIGHT: 157 LBS | OXYGEN SATURATION: 87 % | DIASTOLIC BLOOD PRESSURE: 63 MMHG

## 2017-08-14 DIAGNOSIS — R06.02 SOB (SHORTNESS OF BREATH): ICD-10-CM

## 2017-08-14 DIAGNOSIS — R09.02 HYPOXEMIA: ICD-10-CM

## 2017-08-14 PROCEDURE — 99204 OFFICE O/P NEW MOD 45 MIN: CPT | Mod: GT | Performed by: INTERNAL MEDICINE

## 2017-08-14 NOTE — PROGRESS NOTES
Chief Complaint:  Chief Complaint   Patient presents with   • Establish Care     ref by Amaya Ibarra for Hypoxemia       HPI:   The patient is a 71 y.o. female with no previous history of chronic pulmonary disease was referred to our clinic and primary care physician to be evaluated for hypoxemia. The patient has nocturnal hypoxemia and she is oxygen at night. She was noticed to have exertional hypoxemia and was referred to us for further evaluation. The patient is a heavy smoker she smoked for more than 40 years. Currently she smokes 4-6 cigarettes a day. She denies any cough or wheezing. She can walk on ground level without significant shortness of breath. She gets shortness of breath when she climbed stairs. She would be able to climb 2 flights of stairs as she told me.   Patient had spirometry done 2 months ago which showed FEV1 of 55% predicted and FVC of 55% predicted and normal ratio. These findings are suggestive of restrictive defect. The patient had chest x-ray prior to her visit which did not show clear interstitial lung disease. Her chest x-ray and pulmonary function tests were personally reviewed.    The patient denies orthopnea or paroxysmal nocturnal dyspnea. She denies lower extremity swelling.. She has history of coronary artery disease status post coronary angioplasty with stent placement. The patient has some palpitations on and off.    The patient sleeps alone. She does not think she has significant snoring.  ROS:   Constitutional: Denies fevers, chills, night sweats  Eyes: Denies vision loss, pain, drainage, double vision  Ears, Nose, Throat: Denies earache, difficulty hearing, tinnitus, nasal congestion, hoarseness  Cardiovascular: Denies chest pain, tightness, palpitations, orthopnea or edema  Respiratory: Please see history of present illness  Sleep: Please see history of present illness  GI: Denies heartburn, dysphagia, nausea, abdominal pain, diarrhea or constipation  : Denies frequent  urination, hematuria, discharge or painful urination  Musculoskeletal: Denies back pain, painful joints, sore muscles  Neurological: Denies weakness or headaches  Skin: No rashes  All other ROS were negative except what mentioned in the HPI     Past Medical History:  Past Medical History   Diagnosis Date   • Essential hypertension 4/27/2017   • Dyslipidemia 4/27/2017   • Controlled type 2 diabetes mellitus without complication [place] [place] 4/27/2017   • Coronary artery disease involving native coronary artery of native heart without angina pectoris 4/27/2017   • Reactive depression 4/27/2017   • PAD (peripheral artery disease) (CMS-HCC) 4/27/2017   • Hypoxemia 4/27/2017   • Tobacco abuse 4/27/2017   • BRBPR (bright red blood per rectum) 6/1/2017   • H/O colonoscopy with polypectomy 6/1/2017   • Back pain    • Bronchitis    • Cardiac arrhythmia    • GI bleed    • Obesity    • Valvular heart disease    • Weight loss    • Wears glasses    • Rhinitis    • Swelling of lower extremity    • Cough    • Wheezing    • Diarrhea    • Painful joint    • Sore muscles    • Chickenpox    • Martiniquais measles    • Mumps    • Tonsillitis                Social History:  Social History     Social History   • Marital Status:      Spouse Name: N/A   • Number of Children: N/A   • Years of Education: N/A     Occupational History   • Not on file.     Social History Main Topics   • Smoking status: Current Every Day Smoker -- 0.25 packs/day for 60 years     Types: Cigarettes   • Smokeless tobacco: Never Used   • Alcohol Use: No   • Drug Use: No   • Sexual Activity: No     Other Topics Concern   • Not on file     Social History Narrative             Family History:  Family History   Problem Relation Age of Onset   • Cancer Mother      rectal   • Arthritis Father    • Alcohol/Drug Father    • Heart Disease Father    • Cancer Maternal Aunt      colon   • Diabetes Brother        Current Outpatient Prescriptions on File Prior to Visit   Medication  "Sig Dispense Refill   • simvastatin (ZOCOR) 20 MG Tab Take 1 Tab by mouth every evening. 30 Tab 5   • clopidogrel (PLAVIX) 75 MG Tab Take 1 Tab by mouth every day. 30 Tab 5   • glipiZIDE (GLUCOTROL) 10 MG Tab Take 1 Tab by mouth 2 times a day. 60 Tab 5   • benazepril (LOTENSIN) 40 MG tablet      • pioglitazone (ACTOS) 30 MG Tab      • metoprolol (LOPRESSOR) 25 MG Tab      • metformin (GLUCOPHAGE) 1000 MG tablet      • ibuprofen (MOTRIN) 800 MG Tab      • fluoxetine (PROZAC) 20 MG Cap      • INVOKANA 300 MG Tab      • aspirin (ASA) 81 MG Chew Tab chewable tablet Take 81 mg by mouth every day.     • pioglitazone (ACTOS) 45 MG Tab Take 1 Tab by mouth every day. 30 Tab 11     No current facility-administered medications on file prior to visit.       Allergies:   Aldactone and Percocet        Filed Vitals:    08/14/17 1108   Height: 1.676 m (5' 6\")   Weight: 71.215 kg (157 lb)   Weight % change since last entry.: 0 %   BP: 121/63   Pulse: 63   BMI (Calculated): 25.34   Resp: 16   Temp: 37.1 °C (98.8 °F)   O2 sat % room air: 87 %           Physical Exam:   Exam was done with the help of the telemedicine nurse  Appearance: Well-nourished, well-developed, in no acute distress  HEENT: Normocephalic, atraumatic, white sclera, PERRLA, oropharynx clear  Respiratory: no intercostal retractions or accessory muscle use  Lungs auscultation: Clear to auscultation bilaterally  Cardiovascular: Regular rate rhythm. rubs or gallops.  No LE edema  Abdomen: soft, nondistended  Gait: Normal  Digits: No clubbing, cyanosis  Motor: No focal deficits  Orientation: Oriented to time, person and place      DATA:    Labs:  No results found for: WBC, RBC, HEMOGLOBIN, HEMATOCRIT, MCV, MCH, MCHC, MPV, NEUTSPOLYS, LYMPHOCYTES, MONOCYTES, EOSINOPHILS, BASOPHILS, HYPOCHROMIA, ANISOCYTOSIS   No results found for: SODIUM, POTASSIUM, CHLORIDE, CO2, GLUCOSE, BUN, CREATININE, BUNCREATRAT, GLOMRATE     Imaging:  Please see history of present " illness      PULMONARY FUNCTION TEST:  Please see history of present illness        Diagnosis:  1. Hypoxemia  ECHOCARDIOGRAM COMP W/O CONT    AMB PULMONARY FUNCTION TEST/LAB   2. SOB (shortness of breath)  ECHOCARDIOGRAM COMP W/O CONT    AMB PULMONARY FUNCTION TEST/LAB        Assessment and Plan   The patient has hypoxemia during the daytime as reported by his primary care physician and as the reason for this consultation. Her O2 saturation was 87% and she had right to the clinic today.  She had spirometry which suggests restrictive process. Chest x-ray did not show any major abnormalities.  The patient has been smoking for more than 40 years however she denies any cough or wheezing.  Even her spirometry does not show obstruction, I still think COPD from smoking probably the main cause of her hypoxemia and shortness of breath. The study which we have has some error   We will repeat complete pulmonary function tests to evaluate total lung capacity and diffusion capacity to periods  We will also check echocardiogram to evaluate for heart function and the possibility of pulmonary hypertension.  We will do short distance walking tests today and prescribed oxygen if indicated for exertion and hypoxemia.  The patient will come back in 6 weeks after these studies are done.  Smoking cessation was strongly advised.                        Return in about 6 weeks (around 9/25/2017).        This note was created using voice recognition software. I apologize for any overlooked obvious grammar or  vocabulary mistake

## 2017-09-12 DIAGNOSIS — E11.9 TYPE 2 DIABETES MELLITUS WITHOUT COMPLICATION, UNSPECIFIED LONG TERM INSULIN USE STATUS: ICD-10-CM

## 2017-09-14 ENCOUNTER — APPOINTMENT (OUTPATIENT)
Dept: OTHER | Facility: MEDICAL CENTER | Age: 72
End: 2017-09-14
Payer: MEDICARE

## 2017-09-14 ENCOUNTER — APPOINTMENT (OUTPATIENT)
Dept: CARDIOLOGY | Facility: MEDICAL CENTER | Age: 72
End: 2017-09-14
Attending: INTERNAL MEDICINE
Payer: MEDICARE

## 2017-09-20 ENCOUNTER — TELEMEDICINE2 (OUTPATIENT)
Dept: MEDICAL GROUP | Age: 72
End: 2017-09-20
Payer: MEDICARE

## 2017-09-20 ENCOUNTER — TELEMEDICINE ORIGINATING SITE VISIT (OUTPATIENT)
Dept: MEDICAL GROUP | Facility: CLINIC | Age: 72
End: 2017-09-20
Payer: MEDICARE

## 2017-09-20 VITALS
HEART RATE: 58 BPM | SYSTOLIC BLOOD PRESSURE: 125 MMHG | TEMPERATURE: 98.8 F | HEIGHT: 66 IN | OXYGEN SATURATION: 86 % | BODY MASS INDEX: 25.51 KG/M2 | WEIGHT: 158.7 LBS | DIASTOLIC BLOOD PRESSURE: 60 MMHG | RESPIRATION RATE: 16 BRPM

## 2017-09-20 DIAGNOSIS — E78.5 DYSLIPIDEMIA: ICD-10-CM

## 2017-09-20 DIAGNOSIS — E55.9 VITAMIN D DEFICIENCY: ICD-10-CM

## 2017-09-20 DIAGNOSIS — R09.02 HYPOXEMIA: ICD-10-CM

## 2017-09-20 DIAGNOSIS — R53.83 FATIGUE, UNSPECIFIED TYPE: ICD-10-CM

## 2017-09-20 DIAGNOSIS — I10 ESSENTIAL HYPERTENSION: ICD-10-CM

## 2017-09-20 DIAGNOSIS — K62.5 BRBPR (BRIGHT RED BLOOD PER RECTUM): ICD-10-CM

## 2017-09-20 DIAGNOSIS — E11.9 CONTROLLED TYPE 2 DIABETES MELLITUS WITHOUT COMPLICATION, WITHOUT LONG-TERM CURRENT USE OF INSULIN (HCC): ICD-10-CM

## 2017-09-20 DIAGNOSIS — T75.89XA EXPOSURE, INITIAL ENCOUNTER: ICD-10-CM

## 2017-09-20 PROCEDURE — 99214 OFFICE O/P EST MOD 30 MIN: CPT | Mod: GT | Performed by: INTERNAL MEDICINE

## 2017-09-20 ASSESSMENT — PATIENT HEALTH QUESTIONNAIRE - PHQ9: CLINICAL INTERPRETATION OF PHQ2 SCORE: 0

## 2017-09-20 NOTE — PROGRESS NOTES
CC: Two-month follow-up visit    Verified identification with patient. Secured video conference with RN presenter in [facility]      HPI:   Kimberly presents today with the following.    1. Controlled type 2 diabetes mellitus without complication, without long-term current use of insulin (CMS-HCC)  Patient has been very stable. Diabetes treated with Actos, Glucophage, glipizide, Invokana. Blood sugars in the 120s to 130s. Hemoglobin A1c in June was 8.6. Patient has modified her diabetic diet and has been consuming less carbohydrates. Retinal screening will be due in October    2. Hypoxemia  Patient was seen by pulmonology. Further testing ordered to include complete pulmonary function tests and echocardiogram scheduled for September 29 Patient is using oxygen 24/7. Patient admits to fatigue on exertion however denies orthopnea or PND. Not using inhalers. Denies chest pain, edema. Patient continues to smoke 6 cigarettes a day. Has not been able to cut back.    3. BRBPR (bright red blood per rectum)  Since our last visit, patient has had 2 or 3 more episodes of bright red blood per rectum. Positive history for hemorrhoids. Fecal occult blood test positive. Remote history of colon polyp which was found on a flex sigmoidoscopy about 4-5 years ago. Patient has not had a complete colonoscopy.    4. Dyslipidemia  Taking Zocor 20 mg daily. Requesting lab work    5. Exposure, initial encounter  Patient requesting to check for hepatitis C. 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        Patient Active Problem List    Diagnosis Date Noted   • Exposure 09/20/2017   • BRBPR (bright red blood per rectum) 06/01/2017   • H/O colonoscopy with polypectomy 06/01/2017   • Essential hypertension 04/27/2017   • Dyslipidemia 04/27/2017   • Controlled type 2 diabetes mellitus without complication (CMS-HCC) 04/27/2017   • Coronary artery disease involving native coronary artery of native  "heart without angina pectoris 04/27/2017   • Reactive depression 04/27/2017   • PAD (peripheral artery disease) (CMS-[facility]) 04/27/2017   • Hypoxemia 04/27/2017   • Tobacco abuse 04/27/2017       Current Outpatient Prescriptions   Medication Sig Dispense Refill   • metformin (GLUCOPHAGE) 1000 MG tablet Take 1 Tab by mouth 2 times a day, with meals. Take one tablet orally every morning and one and one-half tablet at bedtime 60 Tab 5   • simvastatin (ZOCOR) 20 MG Tab Take 1 Tab by mouth every evening. 30 Tab 5   • clopidogrel (PLAVIX) 75 MG Tab Take 1 Tab by mouth every day. 30 Tab 5   • glipiZIDE (GLUCOTROL) 10 MG Tab Take 1 Tab by mouth 2 times a day. 60 Tab 5   • benazepril (LOTENSIN) 40 MG tablet      • pioglitazone (ACTOS) 30 MG Tab      • metoprolol (LOPRESSOR) 25 MG Tab      • ibuprofen (MOTRIN) 800 MG Tab      • fluoxetine (PROZAC) 20 MG Cap      • INVOKANA 300 MG Tab      • aspirin (ASA) 81 MG Chew Tab chewable tablet Take 81 mg by mouth every day.     • pioglitazone (ACTOS) 45 MG Tab Take 1 Tab by mouth every day. 30 Tab 11     No current facility-administered medications for this visit.          Allergies as of 09/20/2017 - Reviewed 09/20/2017   Allergen Reaction Noted   • Aldactone [kdc:yellow dye+spironolactone] Rash 04/27/2017   • Percocet [apap-fd&c red #40 [place]-oxycodone] Rash 04/27/2017        ROS: As per HPI.Denies all other cardiopulmonary, GI, neurologic symptoms    /60   Pulse (!) 58   Temp 37.1 °C (98.8 °F)   Resp 16   Ht 1.676 m (5' 6\")   Wt 72 kg (158 lb 11.2 oz)   SpO2 (!) 86%   BMI 25.61 kg/m²     Physical Exam:  Gen:         Alert and oriented, No apparent distress.  Neck:        No Lymphadenopathy or Bruits.  Lungs:     Clear to auscultation bilaterally, no wheezes rhonchi or crackles  CV:          Regular rate and rhythm. No murmurs, rubs or gallops.  Abd:         Soft non tender, non distended. Normal active bowel sounds.  No  Hepatosplenomegaly, No pulsatile masses.          "          Ext:          No clubbing, cyanosis, edema.      Assessment and Plan.   71 y.o. female with the following issues.    1. Controlled type 2 diabetes mellitus without complication, without long-term current use of insulin (CMS-HCC)  Continue current medications. Stable  Diabetic retinal eye exam due  Discussed vaccinations    - COMP METABOLIC PANEL; Future  - MICROALB/CREAT RATIO, TIMED UR  - HGB A1C WITH EAG ESTIMATION    2. Hypoxemia  Pulmonology notes reviewed  Patient to follow-up with pulmonology after completing pulmonary function tests and echocardiogram  Discussed options/resources for tobacco cessation    3. BRBPR (bright red blood per rectum)  Referral to gastroenterology    - IRON/TOTAL IRON BIND; Future  - CBC WITH DIFFERENTIAL; Future    4. Dyslipidemia    - LIPID PROFILE; Future    5. Exposure, initial encounter    - HEP C VIRUS ANTIBODY; Future    6. Fatigue, unspecified type    - TSH; Future            Please note that this dictation was created using voice recognition software. I have made every reasonable attempt to correct obvious errors, but expect that there are errors of grammar and possible content that I did not discover before finalizing note.

## 2017-09-25 ENCOUNTER — NON-PROVIDER VISIT (OUTPATIENT)
Dept: MEDICAL GROUP | Facility: CLINIC | Age: 72
End: 2017-09-25
Payer: MEDICARE

## 2017-09-25 DIAGNOSIS — E11.9 CONTROLLED TYPE 2 DIABETES MELLITUS WITHOUT COMPLICATION, WITHOUT LONG-TERM CURRENT USE OF INSULIN (HCC): ICD-10-CM

## 2017-09-25 PROCEDURE — 36415 COLL VENOUS BLD VENIPUNCTURE: CPT | Performed by: FAMILY MEDICINE

## 2017-09-25 NOTE — PROGRESS NOTES
Kimberly Castañeda is a 71 y.o. female here for a non-provider visit for Venipuncture    If abnormal was an in office provider notified upon receipt of results (if so, indicate provider)? Yes  Routed to PCP? Yes    '

## 2017-09-29 ENCOUNTER — APPOINTMENT (OUTPATIENT)
Dept: OTHER | Facility: MEDICAL CENTER | Age: 72
End: 2017-09-29
Payer: MEDICARE

## 2017-10-03 ENCOUNTER — TELEPHONE (OUTPATIENT)
Dept: MEDICAL GROUP | Facility: CLINIC | Age: 72
End: 2017-10-03

## 2017-10-03 NOTE — TELEPHONE ENCOUNTER
Patient is requesting a referral to [facility] ([facility]) for Echo & PFT.    Thank you!

## 2017-10-04 DIAGNOSIS — R09.02 HYPOXIA: ICD-10-CM

## 2017-10-04 DIAGNOSIS — R06.02 SOB (SHORTNESS OF BREATH): ICD-10-CM

## 2017-10-11 ENCOUNTER — APPOINTMENT (OUTPATIENT)
Dept: SCHEDULING | Facility: IMAGING CENTER | Age: 72
End: 2017-10-11
Payer: MEDICARE

## 2017-10-11 ENCOUNTER — TELEMEDICINE ORIGINATING SITE VISIT (OUTPATIENT)
Dept: MEDICAL GROUP | Facility: CLINIC | Age: 72
End: 2017-10-11
Payer: MEDICARE

## 2017-10-11 DIAGNOSIS — K62.5 BRBPR (BRIGHT RED BLOOD PER RECTUM): ICD-10-CM

## 2017-10-18 ENCOUNTER — TELEMEDICINE ORIGINATING SITE VISIT (OUTPATIENT)
Dept: MEDICAL GROUP | Facility: CLINIC | Age: 72
End: 2017-10-18
Payer: MEDICARE

## 2017-10-18 ENCOUNTER — TELEMEDICINE2 (OUTPATIENT)
Dept: MEDICAL GROUP | Age: 72
End: 2017-10-18
Payer: MEDICARE

## 2017-10-18 ENCOUNTER — TELEPHONE (OUTPATIENT)
Dept: MEDICAL GROUP | Facility: CLINIC | Age: 72
End: 2017-10-18

## 2017-10-18 VITALS
WEIGHT: 158 LBS | BODY MASS INDEX: 25.39 KG/M2 | SYSTOLIC BLOOD PRESSURE: 118 MMHG | DIASTOLIC BLOOD PRESSURE: 61 MMHG | RESPIRATION RATE: 16 BRPM | HEART RATE: 60 BPM | OXYGEN SATURATION: 90 % | TEMPERATURE: 99 F | HEIGHT: 66 IN

## 2017-10-18 DIAGNOSIS — E78.5 DYSLIPIDEMIA: ICD-10-CM

## 2017-10-18 DIAGNOSIS — R09.02 HYPOXEMIA: ICD-10-CM

## 2017-10-18 DIAGNOSIS — K62.5 BRBPR (BRIGHT RED BLOOD PER RECTUM): ICD-10-CM

## 2017-10-18 DIAGNOSIS — E11.9 CONTROLLED TYPE 2 DIABETES MELLITUS WITHOUT COMPLICATION, WITHOUT LONG-TERM CURRENT USE OF INSULIN (HCC): ICD-10-CM

## 2017-10-18 DIAGNOSIS — I10 ESSENTIAL HYPERTENSION: ICD-10-CM

## 2017-10-18 PROCEDURE — 99214 OFFICE O/P EST MOD 30 MIN: CPT | Mod: GT | Performed by: INTERNAL MEDICINE

## 2017-10-18 RX ORDER — CANAGLIFLOZIN 300 MG/1
300 TABLET, FILM COATED ORAL DAILY
Qty: 30 TAB | Refills: 9 | Status: SHIPPED | OUTPATIENT
Start: 2017-10-18 | End: 2018-08-28 | Stop reason: SDUPTHER

## 2017-10-18 ASSESSMENT — PATIENT HEALTH QUESTIONNAIRE - PHQ9: CLINICAL INTERPRETATION OF PHQ2 SCORE: 0

## 2017-10-18 NOTE — TELEPHONE ENCOUNTER
Please call pharmacy to see if patient is taking Invokana brand-name or generic. She is running out of her medications. I will need to order a new prescription. Thanks

## 2017-10-20 NOTE — PROGRESS NOTES
CC: One month follow-up visit. Follow-up lab work    Verified identification with patient. Secured video conference with RN presenter in [facility]      HPI:   Kimberly presents today with the following.    1. Controlled type 2 diabetes mellitus without complication, without long-term current use of insulin (CMS-[facility])  Patient is currently taking Invokana 300 mg tablet, Glucophage 1000 mg twice daily, Glucotrol 10 mg daily, Actos 45 mg daily. Blood sugars have been stable, average blood sugar 110-120 in the morning. Patient has drastically changed her diet and has excluded breath and pasta and all other carbohydrates. Hemoglobin A1c 7.1, down from 8.6. Urine microalbumin within normal limits. Requesting refill for Invokana.     2. BRBPR (bright red blood per rectum)  Hemoccult positive. 2-3 episodes of bright red blood per rectum. Hemoglobin level 14.2, iron 40, TIBC 551. Patient has a remote history of colon polyp in the past. Underwent a flex sigmoidoscopy about 4-5 years ago. [facility]'s [facility] but does not have an appointment until January.    3. Dyslipidemia  Total cholesterol 219, triglyceride 137, HDL 70, . Patient currently taking Zocor 20 mg daily. Liver enzymes within normal limits. Following a low-fat low-cholesterol diet    4. Hypoxemia  Patient is followed by pulmonologist in [place]. Patient to complete her pulmonary function test and echocardiogram prior to her follow-up appointment on October 27.      Patient Active Problem List    Diagnosis Date Noted   • Exposure 09/20/2017   • BRBPR (bright red blood per rectum) 06/01/2017   • H/O colonoscopy with polypectomy 06/01/2017   • Essential hypertension 04/27/2017   • Dyslipidemia 04/27/2017   • Controlled type 2 diabetes mellitus without complication (CMS-HCC) 04/27/2017   • Coronary artery disease involving native coronary artery of native heart without angina pectoris 04/27/2017   • Reactive depression 04/27/2017   • PAD  "(peripheral artery disease) (CMS-[facility]) 04/27/2017   • Hypoxemia 04/27/2017   • Tobacco abuse 04/27/2017       Current Outpatient Prescriptions   Medication Sig Dispense Refill   • INVOKANA 300 MG Tab Take 300 mg by mouth every day. 30 Tab 9   • metformin (GLUCOPHAGE) 1000 MG tablet Take 1 Tab by mouth 2 times a day, with meals. Take one tablet orally every morning and one and one-half tablet at bedtime 60 Tab 5   • simvastatin (ZOCOR) 20 MG Tab Take 1 Tab by mouth every evening. 30 Tab 5   • clopidogrel (PLAVIX) 75 MG Tab Take 1 Tab by mouth every day. 30 Tab 5   • glipiZIDE (GLUCOTROL) 10 MG Tab Take 1 Tab by mouth 2 times a day. 60 Tab 5   • benazepril (LOTENSIN) 40 MG tablet      • pioglitazone (ACTOS) 30 MG Tab      • metoprolol (LOPRESSOR) 25 MG Tab      • ibuprofen (MOTRIN) 800 MG Tab      • fluoxetine (PROZAC) 20 MG Cap      • aspirin (ASA) 81 MG Chew Tab chewable tablet Take 81 mg by mouth every day.     • pioglitazone (ACTOS) 45 MG Tab Take 1 Tab by mouth every day. 30 Tab 11     No current facility-administered medications for this visit.          Allergies as of 10/18/2017 - Reviewed 10/18/2017   Allergen Reaction Noted   • Aldactone [kdc:yellow dye+spironolactone] Rash 04/27/2017   • Percocet [apap-fd&c red #40 [place]-oxycodone] Rash 04/27/2017        ROS: As per HPI.Denies cardiopulmonary, GI, neurologic symptoms at this time    /61   Pulse 60   Temp 37.2 °C (99 °F)   Resp 16   Ht 1.676 m (5' 6\")   Wt 71.7 kg (158 lb)   SpO2 90%   BMI 25.50 kg/m²     Physical Exam:  Gen:         Alert and oriented, No apparent distress.  Neck:        No Lymphadenopathy or Bruits.  Lungs:     Clear to auscultation bilaterally, no wheezes rhonchi or crackles  CV:          Regular rate and rhythm. No murmurs, rubs or gallops.  Abd:         Soft non tender, non distended. Normal active bowel sounds.  No  Hepatosplenomegaly, No pulsatile masses.                   Ext:          No clubbing, cyanosis, " edema.      Assessment and Plan.   71 y.o. female with the following issues.    1. Controlled type 2 diabetes mellitus without complication, without long-term current use of insulin (CMS-[facility])  Stable. Continue current plan of care  Diabetic eye exam due    - INVOKANA 300 MG Tab; Take 300 mg by mouth every day.  Dispense: 30 Tab; Refill: 9    2. BRBPR (bright red blood per rectum)  Priority referral to gastroenterology for colonoscopy    3. Dyslipidemia  Continue current therapy with Zocor  Diet and lifestyle modifications  Fish oil, co q 10    4. Hypoxemia  Release of information from pulmonology  Complete echo and pulmonary function test follow-up with pulmonology in October            Please note that this dictation was created using voice recognition software. I have made every reasonable attempt to correct obvious errors, but expect that there are errors of grammar and possible content that I did not discover before finalizing note.

## 2017-10-23 ENCOUNTER — TELEPHONE (OUTPATIENT)
Dept: MEDICAL GROUP | Facility: CLINIC | Age: 72
End: 2017-10-23

## 2017-10-23 NOTE — TELEPHONE ENCOUNTER
Please ask patient which gastroenterologist she would like to go for to get her colonoscopy. Patient has an appointment with [facility] in January which is too far out. Patient said she would call in with a gastroenterologist for a new referral. Thanks

## 2017-10-24 NOTE — TELEPHONE ENCOUNTER
Dr. Juan Giang @ [facility] - in [facility] [phone]

## 2017-10-25 DIAGNOSIS — K62.5 BRIGHT RED RECTAL BLEEDING: ICD-10-CM

## 2017-11-06 ENCOUNTER — TELEPHONE (OUTPATIENT)
Dept: MEDICAL GROUP | Facility: CLINIC | Age: 72
End: 2017-11-06

## 2017-11-07 DIAGNOSIS — K62.5 BRIGHT RED RECTAL BLEEDING: ICD-10-CM

## 2017-12-11 ENCOUNTER — TELEMEDICINE2 (OUTPATIENT)
Dept: MEDICAL GROUP | Age: 72
End: 2017-12-11
Payer: MEDICARE

## 2017-12-11 ENCOUNTER — TELEMEDICINE ORIGINATING SITE VISIT (OUTPATIENT)
Dept: MEDICAL GROUP | Facility: CLINIC | Age: 72
End: 2017-12-11
Payer: MEDICARE

## 2017-12-11 VITALS
BODY MASS INDEX: 25.39 KG/M2 | SYSTOLIC BLOOD PRESSURE: 118 MMHG | RESPIRATION RATE: 16 BRPM | HEIGHT: 66 IN | TEMPERATURE: 99.6 F | DIASTOLIC BLOOD PRESSURE: 61 MMHG | HEART RATE: 64 BPM | WEIGHT: 158 LBS | OXYGEN SATURATION: 91 %

## 2017-12-11 DIAGNOSIS — E11.9 CONTROLLED TYPE 2 DIABETES MELLITUS WITHOUT COMPLICATION, WITHOUT LONG-TERM CURRENT USE OF INSULIN (HCC): ICD-10-CM

## 2017-12-11 DIAGNOSIS — K62.5 BRBPR (BRIGHT RED BLOOD PER RECTUM): ICD-10-CM

## 2017-12-11 PROCEDURE — 99213 OFFICE O/P EST LOW 20 MIN: CPT | Mod: GT | Performed by: INTERNAL MEDICINE

## 2017-12-13 NOTE — PROGRESS NOTES
CC: Follow-up colonoscopy    Verified identification with patient. Secured video conference with RN presenter in [facility]      HPI:   Kimberly presents today with the following.    1. Controlled type 2 diabetes mellitus without complication, without long-term current use of insulin (CMS-HCC)  Patient is concerned that since taking her prepped for the colonoscopy at the end of November she has noticed that her blood sugars have been staying in the 150-160 range. She has not changed any of her medications which include Invokana,  Glucophage, Glucotrol and Actos. Patient has had occasional diarrhea since her prep but otherwise no other complaints. Hemoglobin A1c in September was 7.1.    2. BRBPR (bright red blood per rectum)  Patient completed her colonoscopy in  [place], [place], with Dr. Marroquin on November 28, 2017. 2 polyps found and hemorrhoids. Patient has not had any repeated episodes of bright red blood per rectum. Patient will have a follow-up with Dr. Marroquin in January.      Patient Active Problem List    Diagnosis Date Noted   • Exposure 09/20/2017   • BRBPR (bright red blood per rectum) 06/01/2017   • H/O colonoscopy with polypectomy 06/01/2017   • Essential hypertension 04/27/2017   • Dyslipidemia 04/27/2017   • Controlled type 2 diabetes mellitus without complication ([facility] 04/27/2017   • Coronary artery disease involving native coronary artery of native heart without angina pectoris 04/27/2017   • Reactive depression 04/27/2017   • PAD (peripheral artery disease) (CMS-HCC) 04/27/2017   • Hypoxemia 04/27/2017   • Tobacco abuse 04/27/2017       Current Outpatient Prescriptions   Medication Sig Dispense Refill   • INVOKANA 300 MG Tab Take 300 mg by mouth every day. 30 Tab 9   • metformin (GLUCOPHAGE) 1000 MG tablet Take 1 Tab by mouth 2 times a day, with meals. Take one tablet orally every morning and one and one-half tablet at bedtime 60 Tab 5   • simvastatin (ZOCOR) 20 MG Tab Take 1 Tab by mouth  "every evening. 30 Tab 5   • clopidogrel (PLAVIX) 75 MG Tab Take 1 Tab by mouth every day. 30 Tab 5   • glipiZIDE (GLUCOTROL) 10 MG Tab Take 1 Tab by mouth 2 times a day. 60 Tab 5   • benazepril (LOTENSIN) 40 MG tablet      • pioglitazone (ACTOS) 30 MG Tab      • metoprolol (LOPRESSOR) 25 MG Tab      • ibuprofen (MOTRIN) 800 MG Tab      • fluoxetine (PROZAC) 20 MG Cap      • aspirin (ASA) 81 MG Chew Tab chewable tablet Take 81 mg by mouth every day.     • pioglitazone (ACTOS) 45 MG Tab Take 1 Tab by mouth every day. 30 Tab 11     No current facility-administered medications for this visit.          Allergies as of 12/11/2017 - Reviewed 12/11/2017   Allergen Reaction Noted   • Aldactone [kdc:yellow dye+spironolactone] Rash 04/27/2017   • Percocet [apap-fd&c red #40 [place]-oxycodone] Rash 04/27/2017        ROS: As per HPI.Denies cardiopulmonary, GI, neurologic symptoms    /61   Pulse 64   Temp 37.6 °C (99.6 °F)   Resp 16   Ht 1.676 m (5' 6\")   Wt 71.7 kg (158 lb)   SpO2 91%   BMI 25.50 kg/m²     Physical Exam:  Gen:         Alert and oriented, No apparent distress.  Neck:        No Lymphadenopathy   Lungs:     Clear to auscultation bilaterally, clear to auscultation bilaterally  CV:          Regular rate and rhythm. No murmurs, rubs or gallops.  Abd:         Soft non tender, non distended.                    Ext:          No  edema.      Assessment and Plan.   72 y.o. female with the following issues.    1. Controlled type 2 diabetes mellitus without complication, without long-term current use of insulin (CMS-[facility])  Patient is to continue her current diabetic medications and monitor blood sugars regularly  RTC in 2 months to recheck hemoglobin A1c  Stable    2. BRBPR (bright red blood per rectum)  Keep follow-up appointment with GI in January            Please note that this dictation was created using voice recognition software. I have made every reasonable attempt to correct obvious errors, but expect " that there are errors of grammar and possible content that I did not discover before finalizing note.

## 2018-01-29 ENCOUNTER — NON-PROVIDER VISIT (OUTPATIENT)
Dept: MEDICAL GROUP | Facility: CLINIC | Age: 73
End: 2018-01-29
Payer: MEDICARE

## 2018-01-29 ENCOUNTER — TELEMEDICINE ORIGINATING SITE VISIT (OUTPATIENT)
Dept: MEDICAL GROUP | Facility: CLINIC | Age: 73
End: 2018-01-29
Payer: MEDICARE

## 2018-01-29 ENCOUNTER — TELEMEDICINE2 (OUTPATIENT)
Dept: MEDICAL GROUP | Age: 73
End: 2018-01-29
Payer: MEDICARE

## 2018-01-29 VITALS
RESPIRATION RATE: 16 BRPM | HEIGHT: 66 IN | WEIGHT: 158 LBS | HEART RATE: 62 BPM | TEMPERATURE: 100.5 F | BODY MASS INDEX: 25.39 KG/M2 | SYSTOLIC BLOOD PRESSURE: 134 MMHG | OXYGEN SATURATION: 90 % | DIASTOLIC BLOOD PRESSURE: 69 MMHG

## 2018-01-29 DIAGNOSIS — I25.10 CORONARY ARTERY DISEASE INVOLVING NATIVE CORONARY ARTERY OF NATIVE HEART WITHOUT ANGINA PECTORIS: ICD-10-CM

## 2018-01-29 DIAGNOSIS — E78.5 DYSLIPIDEMIA: ICD-10-CM

## 2018-01-29 DIAGNOSIS — I10 ESSENTIAL HYPERTENSION: ICD-10-CM

## 2018-01-29 DIAGNOSIS — E11.9 DIABETES MELLITUS WITHOUT COMPLICATION (HCC): ICD-10-CM

## 2018-01-29 DIAGNOSIS — E11.9 CONTROLLED TYPE 2 DIABETES MELLITUS WITHOUT COMPLICATION, WITHOUT LONG-TERM CURRENT USE OF INSULIN (HCC): ICD-10-CM

## 2018-01-29 DIAGNOSIS — E11.9 TYPE 2 DIABETES MELLITUS WITHOUT COMPLICATION, UNSPECIFIED LONG TERM INSULIN USE STATUS: ICD-10-CM

## 2018-01-29 DIAGNOSIS — F32.9 REACTIVE DEPRESSION: ICD-10-CM

## 2018-01-29 LAB
HBA1C MFR BLD: 7.9 % (ref ?–5.8)
INT CON NEG: NEGATIVE
INT CON POS: POSITIVE

## 2018-01-29 PROCEDURE — 92250 FUNDUS PHOTOGRAPHY W/I&R: CPT | Mod: TC,FY | Performed by: INTERNAL MEDICINE

## 2018-01-29 PROCEDURE — 83036 HEMOGLOBIN GLYCOSYLATED A1C: CPT | Performed by: INTERNAL MEDICINE

## 2018-01-29 PROCEDURE — 99214 OFFICE O/P EST MOD 30 MIN: CPT | Performed by: INTERNAL MEDICINE

## 2018-01-29 RX ORDER — GLIPIZIDE 10 MG/1
10 TABLET ORAL 2 TIMES DAILY
Qty: 60 TAB | Refills: 5 | Status: SHIPPED | OUTPATIENT
Start: 2018-01-29 | End: 2018-07-30 | Stop reason: SDUPTHER

## 2018-01-29 RX ORDER — BENAZEPRIL HYDROCHLORIDE 40 MG/1
40 TABLET ORAL DAILY
Qty: 30 TAB | Refills: 5 | Status: SHIPPED | OUTPATIENT
Start: 2018-01-29 | End: 2018-07-03 | Stop reason: SDUPTHER

## 2018-01-29 RX ORDER — SIMVASTATIN 20 MG
20 TABLET ORAL EVERY EVENING
Qty: 30 TAB | Refills: 5 | Status: SHIPPED | OUTPATIENT
Start: 2018-01-29 | End: 2018-08-13 | Stop reason: SDUPTHER

## 2018-01-29 RX ORDER — FLUOXETINE HYDROCHLORIDE 20 MG/1
20 CAPSULE ORAL DAILY
Qty: 30 CAP | Refills: 5 | Status: SHIPPED | OUTPATIENT
Start: 2018-01-29 | End: 2018-10-04 | Stop reason: SDUPTHER

## 2018-01-29 RX ORDER — PIOGLITAZONEHYDROCHLORIDE 45 MG/1
45 TABLET ORAL DAILY
Qty: 30 TAB | Refills: 5 | Status: SHIPPED | OUTPATIENT
Start: 2018-01-29 | End: 2018-10-04 | Stop reason: SDUPTHER

## 2018-01-29 RX ORDER — CLOPIDOGREL BISULFATE 75 MG/1
75 TABLET ORAL DAILY
Qty: 30 TAB | Refills: 5 | Status: SHIPPED | OUTPATIENT
Start: 2018-01-29 | End: 2018-07-03 | Stop reason: SDUPTHER

## 2018-01-29 NOTE — PROGRESS NOTES
Kimberly Castañeda is a 72 y.o. female here for a non-provider visit for A1C & Retinal Scan    If abnormal was an in office provider notified today (if so, indicate provider)? Yes  Routed to PCP? Yes

## 2018-01-31 NOTE — PROGRESS NOTES
CC: Follow-up diabetes, medication refill    Verified identification with patient. Secured video conference with RN presenter in [facility]      HPI:   Kimberly presents today with the following.    1. Controlled type 2 diabetes mellitus without complication, without long-term current use of insulin (CMS-HCC)  Patient's blood sugars have stabilized since it has been fluctuating after her colonoscopy in September. Current hemoglobin A1c 7.9. Patient is following a diabetic diet as best that she can. Patient is taking Invokana, Glucophage, Glucotrol, Actos. Patient's blood sugar on average is 130. Patient's last diabetic retinal exam was just over a year ago with her ophthalmologist in [place]. No diabetic retinopathy noted.    2. Dyslipidemia  Lipid panel up-to-date, completed in September 2017.  Patient taking Zocor 20 mg daily    3. Coronary artery disease involving native coronary artery of native heart without angina pectoris  Patient has a history of coronary artery disease, status post stent placement about 10 years ago in [place]. Patient was taking aspirin, Plavix, Lipitor and has a well-controlled blood pressure. Patient declines cardiology follow-up at this time. Patient is asymptomatic from a cardiac standpoint. Patient continues to use oxygen at nighttime.          Patient Active Problem List    Diagnosis Date Noted   • Exposure 09/20/2017   • BRBPR (bright red blood per rectum) 06/01/2017   • H/O colonoscopy with polypectomy 06/01/2017   • Essential hypertension 04/27/2017   • Dyslipidemia 04/27/2017   • [place]/27[place]   • Coronary artery disease involving native coronary artery of native heart without angina pectoris 04/27/2017   • Reactive depression 04/27/2017   • PAD (peripheral artery disease) (CMS-HCC) 04/27/2017   • Hypoxemia 04/27/2017   • Tobacco abuse 04/27/2017       Current Outpatient Prescriptions   Medication Sig Dispense  "Refill   • metformin (GLUCOPHAGE) 1000 MG tablet Take 1 Tab by mouth 2 times a day, with meals. Take one tablet orally every morning and one and one-half tablet at bedtime 60 Tab 5   • simvastatin (ZOCOR) 20 MG Tab Take 1 Tab by mouth every evening. 30 Tab 5   • clopidogrel (PLAVIX) 75 MG Tab Take 1 Tab by mouth every day. 30 Tab 5   • glipiZIDE (GLUCOTROL) 10 MG Tab Take 1 Tab by mouth 2 times a day. 60 Tab 5   • benazepril (LOTENSIN) 40 MG tablet Take 1 Tab by mouth every day. 30 Tab 5   • pioglitazone (ACTOS) 45 MG Tab Take 1 Tab by mouth every day. 30 Tab 5   • fluoxetine (PROZAC) 20 MG Cap Take 1 Cap by mouth every day. 30 Cap 5   • metoprolol (LOPRESSOR) 25 MG Tab Take 1 Tab by mouth 2 times a day. 60 Tab 5   • INVOKANA 300 MG Tab Take 300 mg by mouth every day. 30 Tab 9   • pioglitazone (ACTOS) 30 MG Tab      • ibuprofen (MOTRIN) 800 MG Tab      • aspirin (ASA) 81 MG Chew Tab chewable tablet Take 81 mg by mouth every day.       No current facility-administered medications for this visit.          Allergies as of 01/29/2018 - Reviewed 01/29/2018   Allergen Reaction Noted   • Aldactone [kdc:yellow dye+spironolactone] Rash 04/27/2017   • Percocet [apap-fd&c red #40 [place]-oxycodone] Rash 04/27/2017        ROS: As per HPI. Patient denies pulmonary, GI, neurologic symptoms.    /69   Pulse 62   Temp (!) 38.1 °C (100.5 °F)   Resp 16   Ht 1.676 m (5' 6\")   Wt 71.7 kg (158 lb)   SpO2 90%   BMI 25.50 kg/m²     Physical Exam:  Gen:         Alert and oriented, No apparent distress.  Neck:        No Lymphadenopathy or Bruits.  Lungs:     Clear to auscultation bilaterally, no wheezes rhonchi or crackles  CV:          Regular rate and rhythm. No murmurs, rubs or gallops.  Abd:         Soft non tender, non distended. Normal active bowel sounds.  No  Hepatosplenomegaly, No pulsatile masses.                   Ext:          No clubbing, cyanosis, edema.      Assessment and Plan.   72 y.o. female with the following " issues.    1. Controlled type 2 diabetes mellitus without complication, without long-term current use of insulin (CMS-HCC)  Continue current plan of care    - POCT Retinal Eye Exam  - glipiZIDE (GLUCOTROL) 10 MG Tab; Take 1 Tab by mouth 2 times a day.  Dispense: 60 Tab; Refill: 5  - pioglitazone (ACTOS) 45 MG Tab; Take 1 Tab by mouth every day.  Dispense: 30 Tab; Refill: 5    2. Dyslipidemia    - simvastatin (ZOCOR) 20 MG Tab; Take 1 Tab by mouth every evening.  Dispense: 30 Tab; Refill: 5    3. Coronary artery disease involving native coronary artery of native heart without angina pectoris  EKG upon follow-up visit    - clopidogrel (PLAVIX) 75 MG Tab; Take 1 Tab by mouth every day.  Dispense: 30 Tab; Refill: 5  - metoprolol (LOPRESSOR) 25 MG Tab; Take 1 Tab by mouth 2 times a day.  Dispense: 60 Tab; Refill: 5    4. Type 2 diabetes mellitus without complication, unspecified long term insulin use status (CMS-HCC)    - metformin (GLUCOPHAGE) 1000 MG tablet; Take 1 Tab by mouth 2 times a day, with meals. Take one tablet orally every morning and one and one-half tablet at bedtime  Dispense: 60 Tab; Refill: 5    5. Reactive depression  Stable on Prozac    - fluoxetine (PROZAC) 20 MG Cap; Take 1 Cap by mouth every day.  Dispense: 30 Cap; Refill: 5    6. Essential hypertension  Stable on current medications    - benazepril (LOTENSIN) 40 MG tablet; Take 1 Tab by mouth every day.  Dispense: 30 Tab; Refill: 5  - metoprolol (LOPRESSOR) 25 MG Tab; Take 1 Tab by mouth 2 times a day.  Dispense: 60 Tab; Refill: 5            Please note that this dictation was created using voice recognition software. I have made every reasonable attempt to correct obvious errors, but expect that there are errors of grammar and possible content that I did not discover before finalizing note.

## 2018-03-15 ENCOUNTER — TELEPHONE (OUTPATIENT)
Dept: MEDICAL GROUP | Facility: CLINIC | Age: 73
End: 2018-03-15

## 2018-03-22 ENCOUNTER — TELEMEDICINE2 (OUTPATIENT)
Dept: MEDICAL GROUP | Age: 73
End: 2018-03-22
Payer: MEDICARE

## 2018-03-22 ENCOUNTER — TELEPHONE (OUTPATIENT)
Dept: MEDICAL GROUP | Facility: CLINIC | Age: 73
End: 2018-03-22

## 2018-03-22 ENCOUNTER — TELEMEDICINE ORIGINATING SITE VISIT (OUTPATIENT)
Dept: MEDICAL GROUP | Facility: CLINIC | Age: 73
End: 2018-03-22
Payer: MEDICARE

## 2018-03-22 VITALS
RESPIRATION RATE: 16 BRPM | DIASTOLIC BLOOD PRESSURE: 73 MMHG | TEMPERATURE: 99.7 F | WEIGHT: 158 LBS | HEART RATE: 91 BPM | HEIGHT: 66 IN | BODY MASS INDEX: 25.39 KG/M2 | SYSTOLIC BLOOD PRESSURE: 124 MMHG | OXYGEN SATURATION: 90 %

## 2018-03-22 DIAGNOSIS — R09.02 HYPOXEMIA: ICD-10-CM

## 2018-03-22 DIAGNOSIS — E11.311 TYPE 2 DIABETES MELLITUS WITH LEFT EYE AFFECTED BY RETINOPATHY AND MACULAR EDEMA, WITHOUT LONG-TERM CURRENT USE OF INSULIN, UNSPECIFIED RETINOPATHY SEVERITY (HCC): ICD-10-CM

## 2018-03-22 DIAGNOSIS — R92.8 ABNORMAL MAMMOGRAM: ICD-10-CM

## 2018-03-22 PROBLEM — E11.39 TYPE 2 DIABETES MELLITUS WITH OPHTHALMIC COMPLICATION (HCC): Status: ACTIVE | Noted: 2018-03-22

## 2018-03-22 PROCEDURE — 99214 OFFICE O/P EST MOD 30 MIN: CPT | Performed by: INTERNAL MEDICINE

## 2018-03-22 NOTE — PROGRESS NOTES
CC: Follow-up mammogram results    Verified identification with patient. Secured video conference with RN presenter in [facility]      HPI:   Kimberly presents today with the following.    1. Abnormal mammogram  Recent mammogram shows nodular densities in upper outer left quadrant of breast, appears stable however noted benign calcifications and recommendations for spot compression CC and MLO and ultrasound recommended. SBE benign. No family or personal history of breast cancer.    2. 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 Patient is taking glipizide 10 mg by mouth 2 times a day, adjusting her metformin which is now 1000 g tablets, 2 tablets in the a.m. 1-1/2 tablets in the p.m. Also taking Actos 45 mg daily. Hemoglobin A1c in January was 7.9. Patient also completed diabetic eye exam which showed mild nonproliferative diabetic retinopathy without macular edema. Patient was seen and evaluated by ophthalmology in [place] with a follow-up of 6 months.      Patient Active Problem List    Diagnosis Date Noted   • Abnormal mammogram 03/22/2018   • Type 2 diabetes mellitus with ophthalmic complication (CMS-HCC) 03/22/2018   • Exposure 09/20/2017   • BRBPR (bright red blood per rectum) 06/01/2017   • H/O colonoscopy with polypectomy 06/01/2017   • Essential hypertension 04/27/2017   • Dyslipidemia 04/27/2017   • Controlled type 2 diabetes mellitus without complication [place] 04/27/2017   • Coronary artery disease involving native coronary artery of native heart without angina pectoris 04/27/2017   • Reactive depression 04/27/2017   • PAD (peripheral artery disease) (CMS-HCC) 04/27/2017   • Hypoxemia 04/27/2017   • Tobacco abuse 04/27/2017       Current Outpatient Prescriptions   Medication Sig Dispense Refill   • metformin  "(GLUCOPHAGE) 1000 MG tablet Take 1 Tab by mouth 2 times a day, with meals. Take one tablet orally every morning and one and one-half tablet at bedtime 60 Tab 5   • simvastatin (ZOCOR) 20 MG Tab Take 1 Tab by mouth every evening. 30 Tab 5   • clopidogrel (PLAVIX) 75 MG Tab Take 1 Tab by mouth every day. 30 Tab 5   • glipiZIDE (GLUCOTROL) 10 MG Tab Take 1 Tab by mouth 2 times a day. 60 Tab 5   • benazepril (LOTENSIN) 40 MG tablet Take 1 Tab by mouth every day. 30 Tab 5   • pioglitazone (ACTOS) 45 MG Tab Take 1 Tab by mouth every day. 30 Tab 5   • fluoxetine (PROZAC) 20 MG Cap Take 1 Cap by mouth every day. 30 Cap 5   • metoprolol (LOPRESSOR) 25 MG Tab Take 1 Tab by mouth 2 times a day. 60 Tab 5   • INVOKANA 300 MG Tab Take 300 mg by mouth every day. 30 Tab 9   • pioglitazone (ACTOS) 30 MG Tab      • ibuprofen (MOTRIN) 800 MG Tab      • aspirin (ASA) 81 MG Chew Tab chewable tablet Take 81 mg by mouth every day.       No current facility-administered medications for this visit.          Allergies as of 03/22/2018 - Reviewed 03/22/2018   Allergen Reaction Noted   • Aldactone [kdc:yellow dye+spironolactone] Rash 04/27/2017   • Percocet [apap-fd&c red #40 [place]-oxycodone] Rash 04/27/2017        ROS: As per HPI. Denies all other cardiopulmonary, GI, neurologic symptoms.    /73   Pulse 91   Temp 37.6 °C (99.7 °F)   Resp 16   Ht 1.676 m (5' 6\")   Wt 71.7 kg (158 lb)   SpO2 90%   BMI 25.50 kg/m²     Physical Exam:  Gen:         Alert and oriented, No apparent distress.  Neck:        No Lymphadenopathy or Bruits. EOMI. PERRLA.  Lungs:     Clear to auscultation bilaterally, no wheezes rhonchi or crackles  CV:          Regular rate and rhythm. No murmurs, rubs or gallops.  Abd:         Soft non tender, non distended. Normal active bowel sounds.  No  Hepatosplenomegaly, No pulsatile masses.                   Ext:          No clubbing, cyanosis, edema.      Assessment and Plan.   72 y.o. female with the following " issues.    1. Abnormal mammogram  Diagnostic mammogram  Diagnostic breast ultrasound    2. 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  Release of information from ophthalmologist in [place] for review.      Total visit time, 25 minutes, greater than 50% of time spent in coordination of medical care and counseling.      Please note that this dictation was created using voice recognition software. I have made every reasonable attempt to correct obvious errors, but expect that there are errors of grammar and possible content that I did not discover before finalizing note.

## 2018-03-27 DIAGNOSIS — J44.9 CHRONIC OBSTRUCTIVE PULMONARY DISEASE, UNSPECIFIED COPD TYPE (HCC): ICD-10-CM

## 2018-04-30 ENCOUNTER — TELEPHONE (OUTPATIENT)
Dept: MEDICAL GROUP | Facility: CLINIC | Age: 73
End: 2018-04-30

## 2018-05-02 ENCOUNTER — NON-PROVIDER VISIT (OUTPATIENT)
Dept: MEDICAL GROUP | Facility: CLINIC | Age: 73
End: 2018-05-02
Payer: MEDICARE

## 2018-05-02 ENCOUNTER — TELEMEDICINE2 (OUTPATIENT)
Dept: MEDICAL GROUP | Age: 73
End: 2018-05-02
Payer: MEDICARE

## 2018-05-02 ENCOUNTER — TELEMEDICINE ORIGINATING SITE VISIT (OUTPATIENT)
Dept: MEDICAL GROUP | Facility: CLINIC | Age: 73
End: 2018-05-02
Payer: MEDICARE

## 2018-05-02 VITALS
BODY MASS INDEX: 25.39 KG/M2 | SYSTOLIC BLOOD PRESSURE: 126 MMHG | TEMPERATURE: 98.5 F | HEIGHT: 66 IN | OXYGEN SATURATION: 90 % | RESPIRATION RATE: 16 BRPM | DIASTOLIC BLOOD PRESSURE: 64 MMHG | HEART RATE: 85 BPM | WEIGHT: 158 LBS

## 2018-05-02 DIAGNOSIS — E11.9 DIABETES MELLITUS WITHOUT COMPLICATION (HCC): ICD-10-CM

## 2018-05-02 DIAGNOSIS — E11.9 CONTROLLED TYPE 2 DIABETES MELLITUS WITHOUT COMPLICATION, WITHOUT LONG-TERM CURRENT USE OF INSULIN (HCC): ICD-10-CM

## 2018-05-02 DIAGNOSIS — R92.8 ABNORMAL MAMMOGRAM: ICD-10-CM

## 2018-05-02 DIAGNOSIS — K62.5 BRBPR (BRIGHT RED BLOOD PER RECTUM): ICD-10-CM

## 2018-05-02 LAB
HBA1C MFR BLD: 8 % (ref ?–5.8)
INT CON NEG: NEGATIVE
INT CON POS: POSITIVE

## 2018-05-02 PROCEDURE — 83036 HEMOGLOBIN GLYCOSYLATED A1C: CPT | Performed by: INTERNAL MEDICINE

## 2018-05-02 PROCEDURE — 99214 OFFICE O/P EST MOD 30 MIN: CPT | Performed by: INTERNAL MEDICINE

## 2018-05-02 NOTE — PROGRESS NOTES
CC: Three-month follow-up visit    Verified identification with patient. Secured video conference with RN presenter in [facility]      HPI:   Kimberly presents today with the following.    1. Controlled type 2 diabetes mellitus without complication, without long-term current use of insulin (HCC)  Patient is here to follow-up on her hemoglobin A1c. Patient has received a new glucometer and is noticing that her blood sugars are ranging on average in the mid 90s. Patient is asymptomatic, denies nausea, dizziness, fatigue. Patient feels stable clinically. Patient is taking maximum doses of Invokana, glipizide, metformin, Actos. Patient declines to use insulin if possible. Hemoglobin A1c today 8.0. Hemoglobin A1c in January was 7.9, 8.6 last June 2017. Patient completed her diabetic retinal exam in January which showed mild diabetic eye disease with recommendations to follow-up in 6-12 months.    2. Abnormal mammogram  Patient completed diagnostic mammogram and ultrasound of the left breast. Addendum to this report which was compared to mammogram completed in 2016, 2012 showed there may be an increased suspicion of breast lesions noted on most recent exam. Recommendations to follow-up with left breast ultrasound diagnostic mammogram, spot compression. Imaging was completed in [place].    3. BRBPR (bright red blood per rectum)  Due to history of episodes of rectal bleeding, patient was seen and evaluated by gastroenterologist, Dr. Marroquin in [place]. Patient underwent colonoscopy but was not notified of these results. Patient inquiring. No new episodes of melena or bright red blood per rectum.      Patient Active Problem List    Diagnosis Date Noted   • Abnormal mammogram 03/22/2018   • Type 2 diabetes mellitus with ophthalmic complication (HCC) 03/22/2018   • Exposure 09/20/2017   • BRBPR (bright red blood per rectum) 06/01/2017   • H/O colonoscopy with polypectomy 06/01/2017   • Essential hypertension 04/27/2017   •  "Dyslipidemia 04/27/2017   • Controlled type 2 diabetes mellitus without complication ([facility]) 04/27/2017   • Coronary artery disease involving native coronary artery of native heart without angina pectoris 04/27/2017   • Reactive depression 04/27/2017   • PAD (peripheral artery disease) ([facility]) 04/27/2017   • Hypoxemia 04/27/2017   • Tobacco abuse 04/27/2017       Current Outpatient Prescriptions   Medication Sig Dispense Refill   • NON SPECIFIED Portable Oxygen  Dx: COPD 1 Each 11   • metformin (GLUCOPHAGE) 1000 MG tablet Take 1 Tab by mouth 2 times a day, with meals. Take one tablet orally every morning and one and one-half tablet at bedtime 60 Tab 5   • simvastatin (ZOCOR) 20 MG Tab Take 1 Tab by mouth every evening. 30 Tab 5   • clopidogrel (PLAVIX) 75 MG Tab Take 1 Tab by mouth every day. 30 Tab 5   • glipiZIDE (GLUCOTROL) 10 MG Tab Take 1 Tab by mouth 2 times a day. 60 Tab 5   • benazepril (LOTENSIN) 40 MG tablet Take 1 Tab by mouth every day. 30 Tab 5   • pioglitazone (ACTOS) 45 MG Tab Take 1 Tab by mouth every day. 30 Tab 5   • fluoxetine (PROZAC) 20 MG Cap Take 1 Cap by mouth every day. 30 Cap 5   • metoprolol (LOPRESSOR) 25 MG Tab Take 1 Tab by mouth 2 times a day. 60 Tab 5   • INVOKANA 300 MG Tab Take 300 mg by mouth every day. 30 Tab 9   • pioglitazone (ACTOS) 30 MG Tab      • ibuprofen (MOTRIN) 800 MG Tab      • aspirin (ASA) 81 MG Chew Tab chewable tablet Take 81 mg by mouth every day.       No current facility-administered medications for this visit.          Allergies as of 05/02/2018 - Reviewed 05/02/2018   Allergen Reaction Noted   • Aldactone [kdc:yellow dye+spironolactone] Rash 04/27/2017   • Percocet [apap-fd&c red #40 [place]-oxycodone] Rash 04/27/2017        ROS: As per HPI. Denies all cardiopulmonary, GI, neurologic symptoms.    /64   Pulse 85   Temp 36.9 °C (98.5 °F)   Resp 16   Ht 1.676 m (5' 6\")   Wt 71.7 kg (158 lb)   SpO2 90%   BMI 25.50 kg/m²     Physical Exam:  Gen:         " Alert and oriented, No apparent distress.  Neck:        No Lymphadenopathy or Bruits.  Lungs:     Clear to auscultation bilaterally , no wheezes rhonchi or crackles.  CV:          Regular rate and rhythm. No murmurs, rubs or gallops.  Abd:         Soft non tender, non distended. Normal active bowel sounds.  No  Hepatosplenomegaly, No pulsatile masses.                   Ext:          No clubbing, cyanosis, edema.      Assessment and Plan.   72 y.o. female with the following issues.    1. Controlled type 2 diabetes mellitus without complication, without long-term current use of insulin (HCC)  Due for retinal exam in 6 months  Continue current plan of care at this time, recheck hemoglobin A1c in 6 months.  Patient will improve diet and lifestyle modifications to help lower blood sugars.  Declines insulin at this time.    2. Abnormal mammogram  Order diagnostic mammogram and breast ultrasound to follow-up on left breast lesions noted on recent imaging    3. BRBPR (bright red blood per rectum)  STACY of clinic notes from Dr. Marroquin, gastroenterologist in [place] for review            Please note that this dictation was created using voice recognition software. I have made every reasonable attempt to correct obvious errors, but expect that there are errors of grammar and possible content that I did not discover before finalizing note.

## 2018-05-02 NOTE — NON-PROVIDER
Kimberly Castañeda is a 72 y.o. female here for a non-provider visit for A1C     If abnormal was an in office provider notified today (if so, indicate provider)? Yes  Routed to PCP? Yes

## 2018-07-03 DIAGNOSIS — I25.10 CORONARY ARTERY DISEASE INVOLVING NATIVE CORONARY ARTERY OF NATIVE HEART WITHOUT ANGINA PECTORIS: ICD-10-CM

## 2018-07-03 DIAGNOSIS — I10 ESSENTIAL HYPERTENSION: ICD-10-CM

## 2018-07-03 RX ORDER — CLOPIDOGREL BISULFATE 75 MG/1
75 TABLET ORAL DAILY
Qty: 30 TAB | Refills: 5 | Status: SHIPPED | OUTPATIENT
Start: 2018-07-03 | End: 2018-07-12 | Stop reason: SDUPTHER

## 2018-07-03 RX ORDER — BENAZEPRIL HYDROCHLORIDE 40 MG/1
40 TABLET ORAL DAILY
Qty: 30 TAB | Refills: 5 | Status: SHIPPED | OUTPATIENT
Start: 2018-07-03 | End: 2018-11-26 | Stop reason: SDUPTHER

## 2018-07-03 NOTE — TELEPHONE ENCOUNTER
Was the patient seen in the last year in this department? Yes     Does patient have an active prescription for medications requested? Yes     Received Request Via: Patient     Requested Prescriptions     Pending Prescriptions Disp Refills   • clopidogrel (PLAVIX) 75 MG Tab 30 Tab 5     Sig: Take 1 Tab by mouth every day.   • benazepril (LOTENSIN) 40 MG tablet 30 Tab 5     Sig: Take 1 Tab by mouth every day.

## 2018-07-12 ENCOUNTER — TELEMEDICINE ORIGINATING SITE VISIT (OUTPATIENT)
Dept: MEDICAL GROUP | Facility: CLINIC | Age: 73
End: 2018-07-12
Payer: MEDICARE

## 2018-07-12 ENCOUNTER — TELEMEDICINE2 (OUTPATIENT)
Dept: MEDICAL GROUP | Age: 73
End: 2018-07-12
Payer: MEDICARE

## 2018-07-12 VITALS
HEIGHT: 66 IN | OXYGEN SATURATION: 92 % | DIASTOLIC BLOOD PRESSURE: 64 MMHG | WEIGHT: 161 LBS | RESPIRATION RATE: 16 BRPM | SYSTOLIC BLOOD PRESSURE: 118 MMHG | TEMPERATURE: 99.1 F | BODY MASS INDEX: 25.88 KG/M2 | HEART RATE: 61 BPM

## 2018-07-12 DIAGNOSIS — I25.10 CORONARY ARTERY DISEASE INVOLVING NATIVE CORONARY ARTERY OF NATIVE HEART WITHOUT ANGINA PECTORIS: ICD-10-CM

## 2018-07-12 DIAGNOSIS — D17.1 LIPOMA OF TORSO: ICD-10-CM

## 2018-07-12 DIAGNOSIS — R09.02 HYPOXEMIA: ICD-10-CM

## 2018-07-12 PROCEDURE — 99213 OFFICE O/P EST LOW 20 MIN: CPT | Performed by: INTERNAL MEDICINE

## 2018-07-12 RX ORDER — CLOPIDOGREL BISULFATE 75 MG/1
75 TABLET ORAL DAILY
Qty: 30 TAB | Refills: 5 | Status: SHIPPED | OUTPATIENT
Start: 2018-07-12 | End: 2018-11-26 | Stop reason: SDUPTHER

## 2018-07-12 NOTE — TELEPHONE ENCOUNTER
Was the patient seen in the last year in this department? Yes     Does patient have an active prescription for medications requested? Yes     Received Request Via: Pharmacy     Requested Prescriptions     Pending Prescriptions Disp Refills   • clopidogrel (PLAVIX) 75 MG Tab 30 Tab 5     Sig: Take 1 Tab by mouth every day.

## 2018-07-12 NOTE — PROGRESS NOTES
CC: Lump on anterior chest wall    Verified identification with patient. Secured video conference with RN presenter in [facility]      HPI:   Kimberly presents today with the following.    1. Lipoma of torso  Patient presents with a lump on her left sided anterior chest wall that she noticed about one month ago. The lump is not painful, not read, no drainage and is movable. Lump is not in breast tissue.  Patient underwent mammogram and breast ultrasound in April which was negative for malignancy.      Patient Active Problem List    Diagnosis Date Noted   • Lipoma of torso 07/12/2018   • Abnormal mammogram 03/22/2018   • Type 2 diabetes mellitus with ophthalmic complication ([facility]) 03/22/2018   • Exposure 09/20/2017   • BRBPR (bright red blood per rectum) 06/01/2017   • H/O colonoscopy with polypectomy 06/01/2017   • Essential hypertension 04/27/2017   • Dyslipidemia 04/27/2017   • Controlled type 2 diabetes mellitus without complication ([facility]) 04/27/2017   • Coronary artery disease involving native coronary artery of native heart without angina pectoris 04/27/2017   • Reactive depression 04/27/2017   • PAD (peripheral artery disease) ([facility]) 04/27/2017   • Hypoxemia 04/27/2017   • Tobacco abuse 04/27/2017       Current Outpatient Prescriptions   Medication Sig Dispense Refill   • clopidogrel (PLAVIX) 75 MG Tab Take 1 Tab by mouth every day. 30 Tab 5   • benazepril (LOTENSIN) 40 MG tablet Take 1 Tab by mouth every day. 30 Tab 5   • NON SPECIFIED Portable Oxygen  Dx: COPD 1 Each 11   • metformin (GLUCOPHAGE) 1000 MG tablet Take 1 Tab by mouth 2 times a day, with meals. Take one tablet orally every morning and one and one-half tablet at bedtime 60 Tab 5   • simvastatin (ZOCOR) 20 MG Tab Take 1 Tab by mouth every evening. 30 Tab 5   • glipiZIDE (GLUCOTROL) 10 MG Tab Take 1 Tab by mouth 2 times a day. 60 Tab 5   • pioglitazone (ACTOS) 45 MG Tab Take 1 Tab by mouth every day. 30 Tab 5   • fluoxetine (PROZAC) 20 MG Cap Take 1  "Cap by mouth every day. 30 Cap 5   • metoprolol (LOPRESSOR) 25 MG Tab Take 1 Tab by mouth 2 times a day. 60 Tab 5   • INVOKANA 300 MG Tab Take 300 mg by mouth every day. 30 Tab 9   • pioglitazone (ACTOS) 30 MG Tab      • ibuprofen (MOTRIN) 800 MG Tab      • aspirin (ASA) 81 MG Chew Tab chewable tablet Take 81 mg by mouth every day.       No current facility-administered medications for this visit.          Allergies as of 07/12/2018 - Reviewed 07/12/2018   Allergen Reaction Noted   • Aldactone [kdc:yellow dye+spironolactone] Rash 04/27/2017   • Percocet [apap-fd&c red #40 [place]-oxycodone] Rash 04/27/2017        ROS: As per HPI. Denies all other cardiopulmonary, GI, neurologic symptoms    /64   Pulse 61   Temp 37.3 °C (99.1 °F)   Resp 16   Ht 1.676 m (5' 6\")   Wt 73 kg (161 lb)   SpO2 92%   BMI 25.99 kg/m²     Physical Exam:  Gen:         Alert and oriented, No apparent distress.  Neck:        No Lymphadenopathy. Neck is supple  Lungs:     Clear to auscultation bilaterally, no wheezes rhonchi or crackles  CV:          Regular rate and rhythm. No murmurs, rubs or gallops.  Abd:         Soft non tender, non distended.                  Ext:          No clubbing, cyanosis, edema.  Musculoskeletal:  4 1/2  inches by 2-1/2 inch movable nontender, nonerythematous soft lump noticed on left anterior chest wall. No axillary lymphadenopathy. No lumps in left breast    Assessment and Plan.   72 y.o. female with the following issues.    1. Lipoma of torso  Most likely lipoma. Continue to monitor  Offered reassurance            Please note that this dictation was created using voice recognition software. I have made every reasonable attempt to correct obvious errors, but expect that there are errors of grammar and possible content that I did not discover before finalizing note.   "

## 2018-07-30 DIAGNOSIS — E11.9 CONTROLLED TYPE 2 DIABETES MELLITUS WITHOUT COMPLICATION, WITHOUT LONG-TERM CURRENT USE OF INSULIN (HCC): ICD-10-CM

## 2018-07-30 RX ORDER — GLIPIZIDE 10 MG/1
10 TABLET ORAL 2 TIMES DAILY
Qty: 60 TAB | Refills: 5 | Status: SHIPPED | OUTPATIENT
Start: 2018-07-30 | End: 2018-10-24 | Stop reason: SDUPTHER

## 2018-07-30 NOTE — TELEPHONE ENCOUNTER
Was the patient seen in the last year in this department? Yes    Does patient have an active prescription for medications requested? Yes    Received Request Via: Pharmacy     Requested Prescriptions     Pending Prescriptions Disp Refills   • glipiZIDE (GLUCOTROL) 10 MG Tab 60 Tab 5     Sig: Take 1 Tab by mouth 2 times a day.   • metformin (GLUCOPHAGE) 1000 MG tablet 60 Tab 5     Sig: Take 1 Tab by mouth 2 times a day, with meals. Take one tablet orally every morning and one and one-half tablet at bedtime

## 2018-08-10 DIAGNOSIS — E78.5 DYSLIPIDEMIA: ICD-10-CM

## 2018-08-10 RX ORDER — SIMVASTATIN 20 MG
20 TABLET ORAL EVERY EVENING
Qty: 30 TAB | Refills: 5 | Status: CANCELLED | OUTPATIENT
Start: 2018-08-10

## 2018-08-10 NOTE — TELEPHONE ENCOUNTER
Was the patient seen in the last year in this department? Yes    Does patient have an active prescription for medications requested? Yes    Received Request Via: Pharmacy     Requested Prescriptions     Pending Prescriptions Disp Refills   • simvastatin (ZOCOR) 20 MG Tab 30 Tab 5     Sig: Take 1 Tab by mouth every evening.

## 2018-08-13 DIAGNOSIS — E78.5 DYSLIPIDEMIA: ICD-10-CM

## 2018-08-13 NOTE — TELEPHONE ENCOUNTER
Was the patient seen in the last year in this department? Yes    Does patient have an active prescription for medications requested? Yes    Received Request Via: Pharmacy     Requested Prescriptions     Pending Prescriptions Disp Refills   • simvastatin (ZOCOR) 20 MG Tab 30 Tab 0     Sig: Take 1 Tab by mouth every evening.

## 2018-08-16 RX ORDER — SIMVASTATIN 20 MG
20 TABLET ORAL EVERY EVENING
Qty: 30 TAB | Refills: 5 | Status: SHIPPED | OUTPATIENT
Start: 2018-08-16 | End: 2019-02-25 | Stop reason: SDUPTHER

## 2018-08-28 DIAGNOSIS — E11.9 CONTROLLED TYPE 2 DIABETES MELLITUS WITHOUT COMPLICATION, WITHOUT LONG-TERM CURRENT USE OF INSULIN (HCC): ICD-10-CM

## 2018-08-28 NOTE — TELEPHONE ENCOUNTER
Was the patient seen in the last year in this department? Yes    Does patient have an active prescription for medications requested? Yes    Received Request Via: Pharmacy     Requested Prescriptions     Pending Prescriptions Disp Refills   • INVOKANA 300 MG Tab 30 Tab 0     Sig: Take 300 mg by mouth every day.

## 2018-08-29 RX ORDER — CANAGLIFLOZIN 300 MG/1
300 TABLET, FILM COATED ORAL DAILY
Qty: 30 TAB | Refills: 5 | Status: SHIPPED | OUTPATIENT
Start: 2018-08-29 | End: 2019-06-03

## 2018-09-05 ENCOUNTER — NON-PROVIDER VISIT (OUTPATIENT)
Dept: MEDICAL GROUP | Facility: CLINIC | Age: 73
End: 2018-09-05
Payer: MEDICARE

## 2018-09-05 ENCOUNTER — TELEMEDICINE ORIGINATING SITE VISIT (OUTPATIENT)
Dept: MEDICAL GROUP | Facility: CLINIC | Age: 73
End: 2018-09-05
Payer: MEDICARE

## 2018-09-05 ENCOUNTER — TELEMEDICINE2 (OUTPATIENT)
Dept: MEDICAL GROUP | Age: 73
End: 2018-09-05
Payer: MEDICARE

## 2018-09-05 VITALS
HEART RATE: 60 BPM | RESPIRATION RATE: 16 BRPM | BODY MASS INDEX: 25.88 KG/M2 | HEIGHT: 66 IN | OXYGEN SATURATION: 89 % | TEMPERATURE: 98.9 F | WEIGHT: 161 LBS | DIASTOLIC BLOOD PRESSURE: 62 MMHG | SYSTOLIC BLOOD PRESSURE: 151 MMHG

## 2018-09-05 DIAGNOSIS — R92.8 ABNORMAL MAMMOGRAM: ICD-10-CM

## 2018-09-05 DIAGNOSIS — E11.9 CONTROLLED TYPE 2 DIABETES MELLITUS WITHOUT COMPLICATION, WITHOUT LONG-TERM CURRENT USE OF INSULIN (HCC): ICD-10-CM

## 2018-09-05 DIAGNOSIS — E55.9 VITAMIN D DEFICIENCY: ICD-10-CM

## 2018-09-05 DIAGNOSIS — E78.5 DYSLIPIDEMIA: ICD-10-CM

## 2018-09-05 DIAGNOSIS — I25.10 CORONARY ARTERY DISEASE INVOLVING NATIVE CORONARY ARTERY OF NATIVE HEART WITHOUT ANGINA PECTORIS: ICD-10-CM

## 2018-09-05 DIAGNOSIS — R53.83 OTHER FATIGUE: ICD-10-CM

## 2018-09-05 DIAGNOSIS — E61.1 LOW SERUM IRON: ICD-10-CM

## 2018-09-05 LAB
HBA1C MFR BLD: 8.5 % (ref ?–5.8)
INT CON NEG: NEGATIVE
INT CON POS: POSITIVE

## 2018-09-05 PROCEDURE — 36415 COLL VENOUS BLD VENIPUNCTURE: CPT | Performed by: INTERNAL MEDICINE

## 2018-09-05 PROCEDURE — 83036 HEMOGLOBIN GLYCOSYLATED A1C: CPT | Performed by: INTERNAL MEDICINE

## 2018-09-05 PROCEDURE — 93000 ELECTROCARDIOGRAM COMPLETE: CPT | Performed by: FAMILY MEDICINE

## 2018-09-05 PROCEDURE — 92250 FUNDUS PHOTOGRAPHY W/I&R: CPT | Mod: TC | Performed by: FAMILY MEDICINE

## 2018-09-05 PROCEDURE — 93000 ELECTROCARDIOGRAM COMPLETE: CPT | Performed by: INTERNAL MEDICINE

## 2018-09-05 PROCEDURE — 99214 OFFICE O/P EST MOD 30 MIN: CPT | Performed by: INTERNAL MEDICINE

## 2018-09-05 NOTE — NON-PROVIDER
Kimberly Castañeda is a 72 y.o. female here for a non-provider visit for A1C/EKG    If abnormal was an in office provider notified today (if so, indicate provider)? Yes  Routed to PCP? Yes

## 2018-09-05 NOTE — PROGRESS NOTES
CC: Four-month follow-up visit    Verified identification with patient. Secured video conference with RN presenter in [facility]      HPI:   Kimberly presents today with the following.    1. Controlled type 2 diabetes mellitus without complication, without long-term current use of insulin (HCC)  Patient currently being treated with maximum doses of Invokana, metformin, Actos, glipizide. Patient has declined the use of insulin in the past. Blood sugar averages around 120. Patient does not experience low blood sugar levels. Patient is following the ADA diet online and is eating approximately 2002 2500-calorie a day.  Abnormal retinal scan in January  Hemoglobin A1c 8.5, up from 8.0  Patient taking Zocor 20 mg daily  Most recent preventative labs completed September 2017    2. Abnormal mammogram  Noted possible increased suspicion of breast lesions on most recent breast imaging. Patient has not completed her diagnostic mammogram and ultrasound as of yet. Patient will need to travel to [place]    3. Coronary artery disease involving native coronary artery of native heart without angina pectoris  PMH of coronary artery disease with valvular heart disease, status post stent placement in 2007. Currently not being followed by cardiology. Patient denies any chest pain, shortness of breath, palpitations or edema.        Patient Active Problem List    Diagnosis Date Noted   • Lipoma of torso 07/12/2018   • Abnormal mammogram 03/22/2018   • Type 2 diabetes mellitus with ophthalmic complication (HCC) 03/22/2018   • Exposure 09/20/2017   • BRBPR (bright red blood per rectum) 06/01/2017   • H/O colonoscopy with polypectomy 06/01/2017   • Essential hypertension 04/27/2017   • Dyslipidemia 04/27/2017   • Controlled type 2 diabetes mellitus without complication (HCC) 04/27/2017   • Coronary artery disease involving native coronary artery of native heart without angina pectoris 04/27/2017   • Reactive depression 04/27/2017   • PAD  "(peripheral artery disease) (HCC) 04/27/2017   • Hypoxemia 04/27/2017   • Tobacco abuse 04/27/2017       Current Outpatient Prescriptions   Medication Sig Dispense Refill   • INVOKANA 300 MG Tab Take 300 mg by mouth every day. 30 Tab 5   • simvastatin (ZOCOR) 20 MG Tab Take 1 Tab by mouth every evening. 30 Tab 5   • glipiZIDE (GLUCOTROL) 10 MG Tab Take 1 Tab by mouth 2 times a day. 60 Tab 5   • metformin (GLUCOPHAGE) 1000 MG tablet Take 1 Tab by mouth 2 times a day, with meals. Take one tablet orally every morning and one and one-half tablet at bedtime 60 Tab 5   • clopidogrel (PLAVIX) 75 MG Tab Take 1 Tab by mouth every day. 30 Tab 5   • benazepril (LOTENSIN) 40 MG tablet Take 1 Tab by mouth every day. 30 Tab 5   • NON SPECIFIED Portable Oxygen  Dx: COPD 1 Each 11   • pioglitazone (ACTOS) 45 MG Tab Take 1 Tab by mouth every day. 30 Tab 5   • fluoxetine (PROZAC) 20 MG Cap Take 1 Cap by mouth every day. 30 Cap 5   • metoprolol (LOPRESSOR) 25 MG Tab Take 1 Tab by mouth 2 times a day. 60 Tab 5   • pioglitazone (ACTOS) 30 MG Tab      • ibuprofen (MOTRIN) 800 MG Tab      • aspirin (ASA) 81 MG Chew Tab chewable tablet Take 81 mg by mouth every day.       No current facility-administered medications for this visit.          Allergies as of 09/05/2018 - Reviewed 09/05/2018   Allergen Reaction Noted   • Aldactone [kdc:yellow dye+spironolactone] Rash 04/27/2017   • Percocet [apap-fd&c red #40 [place]-oxycodone] Rash 04/27/2017        ROS: As per HPI. Denies no other cardiopulmonary, GI, neurologic symptoms.    /62   Pulse 60   Temp 37.2 °C (98.9 °F)   Resp 16   Ht 1.676 m (5' 6\")   Wt 73 kg (161 lb)   SpO2 89%   BMI 25.99 kg/m²     Physical Exam:  Gen:         Alert and oriented, No apparent distress.  Neck:        No Lymphadenopathy or Bruits.  Lungs:     Clear to auscultation bilaterally, no wheezes rhonchi or crackles. No thyromegaly  CV:          Regular rate and rhythm. No murmurs, rubs or gallops.  Abd:     "     Soft non tender, non distended. Normal active bowel sounds.  No  Hepatosplenomegaly, No pulsatile masses.                   Ext:          No clubbing, cyanosis, edema.      Assessment and Plan.   72 y.o. female with the following issues.    1. Controlled type 2 diabetes mellitus without complication, without long-term current use of insulin (HCC)  Patient declines insulin  Briefly discussed medication options  Patient is to decrease her calorie intake to 1800 logan per day, more strict with ADA diet  Monitor blood sugars  POC retinal screen    - MICROALB/CREAT RATIO, TIMED UR  - COMP METABOLIC PANEL; Future  - LIPID PROFILE; Future    2. Abnormal mammogram  Patient will complete ordered diagnostic mammogram and breast ultrasound    3. Coronary artery disease involving native coronary artery of native heart without angina pectoris  Cardiology referral for surveillance as patient lives in [place]  Continue current treatment plan with Plavix, beta blocker, ACE inhibitor  EKG shows normal sinus rhythm, heart rate 61, old anterior infarct.    - EKG - Clinic Performed    4. Vitamin D deficiency    - VITAMIN D,25 HYDROXY; Future    5. Dyslipidemia  Continue Zocor 20 mg daily    - LIPID PROFILE; Future    6. Other fatigue    - IRON/TOTAL IRON BIND; Future  - CBC WITH DIFFERENTIAL; Future  - TSH; Future    7. Low serum iron  Most recent iron level is low at 40  History of bright red blood per rectum, status post colonoscopy. No results in chart  Obtain medical records for colonoscopy results  Recheck CBC with iron levels            Please note that this dictation was created using voice recognition software. I have made every reasonable attempt to correct obvious errors, but expect that there are errors of grammar and possible content that I did not discover before finalizing note.

## 2018-09-12 LAB — RETINAL SCREEN: NEGATIVE

## 2018-09-17 ENCOUNTER — TELEPHONE (OUTPATIENT)
Dept: MEDICAL GROUP | Facility: CLINIC | Age: 73
End: 2018-09-17

## 2018-09-17 DIAGNOSIS — I25.10 CORONARY ARTERY DISEASE INVOLVING NATIVE CORONARY ARTERY OF NATIVE HEART WITHOUT ANGINA PECTORIS: ICD-10-CM

## 2018-09-17 NOTE — TELEPHONE ENCOUNTER
Please inform patient that she needs to complete an EKG on the day of her cardiology visit via telemedicine on October 10. Orders have been placed.

## 2018-10-04 DIAGNOSIS — I10 ESSENTIAL HYPERTENSION: ICD-10-CM

## 2018-10-04 DIAGNOSIS — F32.9 REACTIVE DEPRESSION: ICD-10-CM

## 2018-10-04 DIAGNOSIS — E11.9 CONTROLLED TYPE 2 DIABETES MELLITUS WITHOUT COMPLICATION, WITHOUT LONG-TERM CURRENT USE OF INSULIN (HCC): ICD-10-CM

## 2018-10-04 DIAGNOSIS — I25.10 CORONARY ARTERY DISEASE INVOLVING NATIVE CORONARY ARTERY OF NATIVE HEART WITHOUT ANGINA PECTORIS: ICD-10-CM

## 2018-10-04 RX ORDER — PIOGLITAZONEHYDROCHLORIDE 45 MG/1
45 TABLET ORAL DAILY
Qty: 30 TAB | Refills: 5 | Status: SHIPPED | OUTPATIENT
Start: 2018-10-04 | End: 2019-03-13 | Stop reason: SDUPTHER

## 2018-10-04 RX ORDER — FLUOXETINE HYDROCHLORIDE 20 MG/1
20 CAPSULE ORAL DAILY
Qty: 30 CAP | Refills: 5 | Status: SHIPPED | OUTPATIENT
Start: 2018-10-04 | End: 2018-11-26 | Stop reason: SDUPTHER

## 2018-10-04 NOTE — TELEPHONE ENCOUNTER
Was the patient seen in the last year in this department? Yes    Does patient have an active prescription for medications requested? Yes    Received Request Via: Pharmacy     Requested Prescriptions     Pending Prescriptions Disp Refills   • FLUoxetine (PROZAC) 20 MG Cap 30 Cap 5     Sig: Take 1 Cap by mouth every day.   • metoprolol (LOPRESSOR) 25 MG Tab 60 Tab 5     Sig: Take 1 Tab by mouth 2 times a day.   • pioglitazone (ACTOS) 45 MG Tab 30 Tab 5     Sig: Take 1 Tab by mouth every day.

## 2018-10-10 ENCOUNTER — TELEMEDICINE2 (OUTPATIENT)
Dept: CARDIOLOGY | Facility: MEDICAL CENTER | Age: 73
End: 2018-10-10
Payer: MEDICARE

## 2018-10-10 ENCOUNTER — TELEMEDICINE ORIGINATING SITE VISIT (OUTPATIENT)
Dept: MEDICAL GROUP | Facility: CLINIC | Age: 73
End: 2018-10-10
Payer: MEDICARE

## 2018-10-10 ENCOUNTER — NON-PROVIDER VISIT (OUTPATIENT)
Dept: MEDICAL GROUP | Facility: CLINIC | Age: 73
End: 2018-10-10
Payer: MEDICARE

## 2018-10-10 VITALS
WEIGHT: 164 LBS | HEART RATE: 97 BPM | BODY MASS INDEX: 26.36 KG/M2 | HEIGHT: 66 IN | DIASTOLIC BLOOD PRESSURE: 66 MMHG | SYSTOLIC BLOOD PRESSURE: 139 MMHG | OXYGEN SATURATION: 100 %

## 2018-10-10 DIAGNOSIS — I10 ESSENTIAL HYPERTENSION: ICD-10-CM

## 2018-10-10 DIAGNOSIS — I25.10 CORONARY ARTERY DISEASE INVOLVING NATIVE CORONARY ARTERY OF NATIVE HEART WITHOUT ANGINA PECTORIS: ICD-10-CM

## 2018-10-10 DIAGNOSIS — E78.5 DYSLIPIDEMIA: ICD-10-CM

## 2018-10-10 DIAGNOSIS — Z72.0 TOBACCO ABUSE: ICD-10-CM

## 2018-10-10 DIAGNOSIS — Z95.5 STENTED CORONARY ARTERY: ICD-10-CM

## 2018-10-10 PROCEDURE — 99203 OFFICE O/P NEW LOW 30 MIN: CPT | Performed by: INTERNAL MEDICINE

## 2018-10-10 PROCEDURE — 93000 ELECTROCARDIOGRAM COMPLETE: CPT | Performed by: FAMILY MEDICINE

## 2018-10-10 RX ORDER — NITROGLYCERIN 0.4 MG/1
0.4 TABLET SUBLINGUAL PRN
Qty: 25 TAB | Refills: 1 | Status: SHIPPED | OUTPATIENT
Start: 2018-10-10

## 2018-10-10 ASSESSMENT — ENCOUNTER SYMPTOMS
MUSCULOSKELETAL NEGATIVE: 1
CONSTITUTIONAL NEGATIVE: 1
CARDIOVASCULAR NEGATIVE: 1
SHORTNESS OF BREATH: 1
NEUROLOGICAL NEGATIVE: 1
PALPITATIONS: 0
GASTROINTESTINAL NEGATIVE: 1

## 2018-10-10 NOTE — NON-PROVIDER
Kimberly Castañeda is a 72 y.o. female here for a non-provider visit for EKG.    If abnormal was an in office provider notified today (if so, indicate provider)? Yes  Routed to PCP? Yes

## 2018-10-10 NOTE — LETTER
[facility]   [address], [address]  [place] [address]  Phone: [phone]  Fax: [phone]              Kimberly Castañeda  1945    Encounter Date: 10/10/2018    Bhavik Sifuentes M.D.          PROGRESS NOTE:  Chief Complaint   Patient presents with   • Coronary Artery Disease       Subjective:   Kimberly Castañeda is a 72 y.o. female who is seen in consultation at the request of Dr. Ibarra for evaluation of her cardiac status.  The patient had a stent placed in 2007 in [place].  She thinks it was done at [facility], but is not entirely sure.  At that time she had anterior chest pressure and burning.  She thinks she had heart attack at that time but is not certain.   She had been seen several years ago by seeing [place] cardiology in the hospital in clinic but has not seen a heart doctor for many years.  She has not had a stress test since her stent was placed.  The patient has had no recent chest pain or pressure denies palpitations or edema.  She is physically inactive and is on full-time oxygen.  Patient still smoking about quarter pack of cigarettes daily.  Risk factor profile otherwise positive for hypertension and hyperlipidemia.  She also has diabetes mellitus type 2  She had recent lab work done which is currently is not available for review but will be located.    Past Medical History:   Diagnosis Date   • Abnormal mammogram 3/22/2018   • Back pain    • BRBPR (bright red blood per rectum) 6/1/2017   • Bronchitis    • Cardiac arrhythmia    • Chickenpox    • Controlled type 2 diabetes mellitus without complication (HCC) 4/27/2017   • Coronary artery disease involving native coronary artery of native heart without angina pectoris 4/27/2017   • Cough    • Diarrhea    • Dyslipidemia 4/27/2017   • Essential hypertension 4/27/2017   • Exposure 9/20/2017   • English measles    • GI bleed    • H/O colonoscopy with polypectomy 6/1/2017   • Hypoxemia 4/27/2017   • Lipoma of  torso 7/12/2018   • Mumps    • Obesity    • PAD (peripheral artery disease) ([facility]) 4/27/2017   • Painful joint    • Reactive depression 4/27/2017   • Rhinitis    • Sore muscles    • Swelling of lower extremity    • Tobacco abuse 4/27/2017   • Tonsillitis    • Type 2 diabetes mellitus with ophthalmic complication ([facility]) 3/22/2018   • Valvular heart disease    • Wears glasses    • Weight loss    • Wheezing      Past Surgical History:   Procedure Laterality Date   • STENT PLACEMENT  2007    cardiac stent   • HYSTERECTOMY LAPAROSCOPY  1988   • TUBAL LIGATION  1983   • APPENDECTOMY     • TONSILLECTOMY     • VEIN STRIPPING       Family History   Problem Relation Age of Onset   • Cancer Mother         rectal   • Arthritis Father    • Alcohol/Drug Father    • Heart Disease Father    • Diabetes Brother    • Cancer Maternal Aunt         colon     Social History     Social History   • Marital status:      Spouse name: N/A   • Number of children: N/A   • Years of education: N/A     Occupational History   • Not on file.     Social History Main Topics   • Smoking status: Current Every Day Smoker     Packs/day: 0.25     Years: 60.00     Types: Cigarettes   • Smokeless tobacco: Never Used   • Alcohol use No   • Drug use: No   • Sexual activity: No     Other Topics Concern   • Not on file     Social History Narrative   • No narrative on file     Allergies   Allergen Reactions   • Aldactone [Kdc:Yellow Dye+Spironolactone] Rash     Full body   • Percocet [Apap-Fd&C Red #40 [place]-Oxycodone] Rash     Full body     Outpatient Encounter Prescriptions as of 10/10/2018   Medication Sig Dispense Refill   • nitroglycerin (NITROSTAT) 0.4 MG SL Tab Place 1 Tab under tongue as needed for Chest Pain. 25 Tab 1   • FLUoxetine (PROZAC) 20 MG Cap Take 1 Cap by mouth every day. 30 Cap 5   • metoprolol (LOPRESSOR) 25 MG Tab Take 1 Tab by mouth 2 times a day. 60 Tab 5   • pioglitazone (ACTOS) 45 MG Tab Take 1 Tab by mouth every day. 30 Tab 5   •  "INVOKANA 300 MG Tab Take 300 mg by mouth every day. 30 Tab 5   • simvastatin (ZOCOR) 20 MG Tab Take 1 Tab by mouth every evening. 30 Tab 5   • glipiZIDE (GLUCOTROL) 10 MG Tab Take 1 Tab by mouth 2 times a day. 60 Tab 5   • metformin (GLUCOPHAGE) 1000 MG tablet Take 1 Tab by mouth 2 times a day, with meals. Take one tablet orally every morning and one and one-half tablet at bedtime 60 Tab 5   • clopidogrel (PLAVIX) 75 MG Tab Take 1 Tab by mouth every day. 30 Tab 5   • benazepril (LOTENSIN) 40 MG tablet Take 1 Tab by mouth every day. 30 Tab 5   • NON SPECIFIED Portable Oxygen  Dx: COPD 1 Each 11   • aspirin (ASA) 81 MG Chew Tab chewable tablet Take 81 mg by mouth every day.     • pioglitazone (ACTOS) 30 MG Tab      • ibuprofen (MOTRIN) 800 MG Tab        No facility-administered encounter medications on file as of 10/10/2018.      Review of Systems   Constitutional: Negative.    HENT: Negative.    Respiratory: Positive for shortness of breath.         Uses home oxygen   Cardiovascular: Negative.  Negative for chest pain, palpitations and leg swelling.   Gastrointestinal: Negative.    Musculoskeletal: Negative.    Skin: Negative.    Neurological: Negative.    Endo/Heme/Allergies: Negative.         Objective:   /66 (BP Location: Right arm, Patient Position: Sitting, BP Cuff Size: Adult)   Pulse 97   Ht 1.676 m (5' 6\")   Wt 74.4 kg (164 lb)   SpO2 100%   BMI 26.47 kg/m²      Physical Exam   Constitutional: She is oriented to person, place, and time. No distress.   Telemedicine physical exam   HENT:   Head: Normocephalic and atraumatic.   Cardiovascular: Normal rate and regular rhythm.  Exam reveals gallop and S4.    Murmur heard.   Systolic murmur is present with a grade of 1/6   Musculoskeletal: She exhibits no edema.   Neurological: She is alert and oriented to person, place, and time.   Psychiatric: She has a normal mood and affect.       Assessment:     1. Dyslipidemia  NM-CARDIAC STRESS TEST   2. Essential " hypertension     3. Tobacco abuse     4. Stented coronary artery  NM-CARDIAC STRESS TEST   5. Coronary artery disease involving native coronary artery of native heart without angina pectoris  NM-CARDIAC STRESS TEST       Medical Decision Making:  Today's Assessment / Status / Plan:   Coronary artery disease: Patient has stenting of an unknown artery artery at [facility] in 2007.  Records will be sent for.  She has had no recent angina.  The patient has never had a stress test since her event and is overdue for stress testing.  A myocardial perfusion scan will be arranged in [place] which is her closest hospital where the test can be performed.    Her records will be sent for and her latest lab will be reviewed.    Cigarette abuse: Patient was strongly urged to quit smoking.  The synergistic effect of smoking and diabetes on arterial damage was discussed.  She is also quit smoking as she is on oxygen at home.  The safety implications of smoking while on oxygen was discussed as well.    Hyperlipidemia: We will obtain lab results and review.  Because of her panoply of risk factors, her target LDL is less than 70.    Hypertension: Blood pressure is under good control on her current medications.  .Telemedicine      Amaya Ibarra M.D.  [address]  [address]  [place] [address]  VIA In Basket

## 2018-10-10 NOTE — PROGRESS NOTES
Chief Complaint   Patient presents with   • Coronary Artery Disease       Subjective:   Kimberly Castañeda is a 72 y.o. female who is seen in consultation at the request of Dr. Ibarra for evaluation of her cardiac status.  The patient had a stent placed in 2007 in [place].  She thinks it was done at [facility], but is not entirely sure.  At that time she had anterior chest pressure and burning.  She thinks she had heart attack at that time but is not certain.   She had been seen several years ago by seeing [place] cardiology in the hospital in clinic but has not seen a heart doctor for many years.  She has not had a stress test since her stent was placed.  The patient has had no recent chest pain or pressure denies palpitations or edema.  She is physically inactive and is on full-time oxygen.  Patient still smoking about quarter pack of cigarettes daily.  Risk factor profile otherwise positive for hypertension and hyperlipidemia.  She also has diabetes mellitus type 2  She had recent lab work done which is currently is not available for review but will be located.    Past Medical History:   Diagnosis Date   • Abnormal mammogram 3/22/2018   • Back pain    • BRBPR (bright red blood per rectum) 6/1/2017   • Bronchitis    • Cardiac arrhythmia    • Chickenpox    • Controlled type 2 diabetes mellitus without complication ([facility]) 4/27/2017   • Coronary artery disease involving native coronary artery of native heart without angina pectoris 4/27/2017   • Cough    • Diarrhea    • Dyslipidemia 4/27/2017   • Essential hypertension 4/27/2017   • Exposure 9/20/2017   • [place] measles    • GI bleed    • H/O colonoscopy with polypectomy 6/1/2017   • Hypoxemia 4/27/2017   • Lipoma of torso 7/12/2018   • Mumps    • Obesity    • PAD (peripheral artery disease) ([facility]) 4/27/2017   • Painful joint    • Reactive depression 4/27/2017   • Rhinitis    • Sore muscles    • Swelling of lower extremity    • Tobacco abuse 4/27/2017   • Tonsillitis    • Type 2  diabetes mellitus with ophthalmic complication (HCC) 3/22/2018   • Valvular heart disease    • Wears glasses    • Weight loss    • Wheezing      Past Surgical History:   Procedure Laterality Date   • STENT PLACEMENT  2007    cardiac stent   • HYSTERECTOMY LAPAROSCOPY  1988   • TUBAL LIGATION  1983   • APPENDECTOMY     • TONSILLECTOMY     • VEIN STRIPPING       Family History   Problem Relation Age of Onset   • Cancer Mother         rectal   • Arthritis Father    • Alcohol/Drug Father    • Heart Disease Father    • Diabetes Brother    • Cancer Maternal Aunt         colon     Social History     Social History   • Marital status:      Spouse name: N/A   • Number of children: N/A   • Years of education: N/A     Occupational History   • Not on file.     Social History Main Topics   • Smoking status: Current Every Day Smoker     Packs/day: 0.25     Years: 60.00     Types: Cigarettes   • Smokeless tobacco: Never Used   • Alcohol use No   • Drug use: No   • Sexual activity: No     Other Topics Concern   • Not on file     Social History Narrative   • No narrative on file     Allergies   Allergen Reactions   • Aldactone [Kdc:Yellow Dye+Spironolactone] Rash     Full body   • Percocet [Apap-Fd&C Red #40 [place]-Oxycodone] Rash     Full body     Outpatient Encounter Prescriptions as of 10/10/2018   Medication Sig Dispense Refill   • nitroglycerin (NITROSTAT) 0.4 MG SL Tab Place 1 Tab under tongue as needed for Chest Pain. 25 Tab 1   • FLUoxetine (PROZAC) 20 MG Cap Take 1 Cap by mouth every day. 30 Cap 5   • metoprolol (LOPRESSOR) 25 MG Tab Take 1 Tab by mouth 2 times a day. 60 Tab 5   • pioglitazone (ACTOS) 45 MG Tab Take 1 Tab by mouth every day. 30 Tab 5   • INVOKANA 300 MG Tab Take 300 mg by mouth every day. 30 Tab 5   • simvastatin (ZOCOR) 20 MG Tab Take 1 Tab by mouth every evening. 30 Tab 5   • glipiZIDE (GLUCOTROL) 10 MG Tab Take 1 Tab by mouth 2 times a day. 60 Tab 5   • metformin (GLUCOPHAGE) 1000 MG tablet Take  "1 Tab by mouth 2 times a day, with meals. Take one tablet orally every morning and one and one-half tablet at bedtime 60 Tab 5   • clopidogrel (PLAVIX) 75 MG Tab Take 1 Tab by mouth every day. 30 Tab 5   • benazepril (LOTENSIN) 40 MG tablet Take 1 Tab by mouth every day. 30 Tab 5   • NON SPECIFIED Portable Oxygen  Dx: COPD 1 Each 11   • aspirin (ASA) 81 MG Chew Tab chewable tablet Take 81 mg by mouth every day.     • pioglitazone (ACTOS) 30 MG Tab      • ibuprofen (MOTRIN) 800 MG Tab        No facility-administered encounter medications on file as of 10/10/2018.      Review of Systems   Constitutional: Negative.    HENT: Negative.    Respiratory: Positive for shortness of breath.         Uses home oxygen   Cardiovascular: Negative.  Negative for chest pain, palpitations and leg swelling.   Gastrointestinal: Negative.    Musculoskeletal: Negative.    Skin: Negative.    Neurological: Negative.    Endo/Heme/Allergies: Negative.         Objective:   /66 (BP Location: Right arm, Patient Position: Sitting, BP Cuff Size: Adult)   Pulse 97   Ht 1.676 m (5' 6\")   Wt 74.4 kg (164 lb)   SpO2 100%   BMI 26.47 kg/m²     Physical Exam   Constitutional: She is oriented to person, place, and time. No distress.   Telemedicine physical exam   HENT:   Head: Normocephalic and atraumatic.   Cardiovascular: Normal rate and regular rhythm.  Exam reveals gallop and S4.    Murmur heard.   Systolic murmur is present with a grade of 1/6   Musculoskeletal: She exhibits no edema.   Neurological: She is alert and oriented to person, place, and time.   Psychiatric: She has a normal mood and affect.       Assessment:     1. Dyslipidemia  NM-CARDIAC STRESS TEST   2. Essential hypertension     3. Tobacco abuse     4. Stented coronary artery  NM-CARDIAC STRESS TEST   5. Coronary artery disease involving native coronary artery of native heart without angina pectoris  NM-CARDIAC STRESS TEST       Medical Decision Making:  Today's Assessment " / Status / Plan:   Coronary artery disease: Patient has stenting of an unknown artery artery at [facility] in 2007.  Records will be sent for.  She has had no recent angina.  The patient has never had a stress test since her event and is overdue for stress testing.  A myocardial perfusion scan will be arranged in [place] which is her closest hospital where the test can be performed.    Her records will be sent for and her latest lab will be reviewed.    Cigarette abuse: Patient was strongly urged to quit smoking.  The synergistic effect of smoking and diabetes on arterial damage was discussed.  She is also quit smoking as she is on oxygen at home.  The safety implications of smoking while on oxygen was discussed as well.    Hyperlipidemia: We will obtain lab results and review.  Because of her panoply of risk factors, her target LDL is less than 70.    Hypertension: Blood pressure is under good control on her current medications.  .Telemedicine

## 2018-11-26 DIAGNOSIS — I25.10 CORONARY ARTERY DISEASE INVOLVING NATIVE CORONARY ARTERY OF NATIVE HEART WITHOUT ANGINA PECTORIS: ICD-10-CM

## 2018-11-26 DIAGNOSIS — F32.9 REACTIVE DEPRESSION: ICD-10-CM

## 2018-11-26 DIAGNOSIS — I10 ESSENTIAL HYPERTENSION: ICD-10-CM

## 2018-11-27 RX ORDER — BENAZEPRIL HYDROCHLORIDE 40 MG/1
40 TABLET ORAL DAILY
Qty: 30 TAB | Refills: 5 | Status: SHIPPED | OUTPATIENT
Start: 2018-11-27 | End: 2019-04-08 | Stop reason: SDUPTHER

## 2018-11-27 RX ORDER — CLOPIDOGREL BISULFATE 75 MG/1
75 TABLET ORAL DAILY
Qty: 30 TAB | Refills: 5 | Status: SHIPPED | OUTPATIENT
Start: 2018-11-27 | End: 2019-01-22 | Stop reason: SDUPTHER

## 2018-11-27 RX ORDER — FLUOXETINE HYDROCHLORIDE 20 MG/1
20 CAPSULE ORAL DAILY
Qty: 30 CAP | Refills: 3 | Status: SHIPPED | OUTPATIENT
Start: 2018-11-27 | End: 2019-03-13 | Stop reason: SDUPTHER

## 2018-12-12 ENCOUNTER — NON-PROVIDER VISIT (OUTPATIENT)
Dept: MEDICAL GROUP | Facility: CLINIC | Age: 73
End: 2018-12-12
Payer: MEDICARE

## 2018-12-12 ENCOUNTER — TELEMEDICINE2 (OUTPATIENT)
Dept: MEDICAL GROUP | Age: 73
End: 2018-12-12
Payer: MEDICARE

## 2018-12-12 ENCOUNTER — TELEMEDICINE ORIGINATING SITE VISIT (OUTPATIENT)
Dept: MEDICAL GROUP | Facility: CLINIC | Age: 73
End: 2018-12-12
Payer: MEDICARE

## 2018-12-12 VITALS
SYSTOLIC BLOOD PRESSURE: 124 MMHG | OXYGEN SATURATION: 88 % | BODY MASS INDEX: 26.44 KG/M2 | HEART RATE: 64 BPM | HEIGHT: 66 IN | TEMPERATURE: 97.2 F | WEIGHT: 164.5 LBS | DIASTOLIC BLOOD PRESSURE: 64 MMHG

## 2018-12-12 DIAGNOSIS — E11.8 CONTROLLED DIABETES MELLITUS TYPE 2 WITH COMPLICATIONS, UNSPECIFIED WHETHER LONG TERM INSULIN USE (HCC): ICD-10-CM

## 2018-12-12 DIAGNOSIS — Z95.5 STENTED CORONARY ARTERY: ICD-10-CM

## 2018-12-12 DIAGNOSIS — M12.9 ARTHRITIS/ARTHROPATHY OF MULTIPLE JOINTS: ICD-10-CM

## 2018-12-12 DIAGNOSIS — E11.9 CONTROLLED TYPE 2 DIABETES MELLITUS WITHOUT COMPLICATION, WITHOUT LONG-TERM CURRENT USE OF INSULIN (HCC): ICD-10-CM

## 2018-12-12 DIAGNOSIS — L03.119 CELLULITIS AND ABSCESS OF HAND: ICD-10-CM

## 2018-12-12 DIAGNOSIS — L02.519 CELLULITIS AND ABSCESS OF HAND: ICD-10-CM

## 2018-12-12 LAB
HBA1C MFR BLD: 7.7 % (ref ?–5.8)
INT CON NEG: NEGATIVE
INT CON POS: POSITIVE

## 2018-12-12 PROCEDURE — 99214 OFFICE O/P EST MOD 30 MIN: CPT | Performed by: INTERNAL MEDICINE

## 2018-12-12 PROCEDURE — 83036 HEMOGLOBIN GLYCOSYLATED A1C: CPT | Performed by: INTERNAL MEDICINE

## 2018-12-12 RX ORDER — MELOXICAM 7.5 MG/1
7.5 TABLET ORAL DAILY
Qty: 30 TAB | Refills: 5 | Status: SHIPPED | OUTPATIENT
Start: 2018-12-12 | End: 2019-05-23 | Stop reason: SDUPTHER

## 2018-12-12 NOTE — NON-PROVIDER
Kimberly Castañeda is a 73 y.o. female here for a non-provider visit for A1C    If abnormal was an in office provider notified today (if so, indicate provider)? Yes  Routed to PCP? Yes

## 2018-12-12 NOTE — PROGRESS NOTES
CC: 3-month follow-up visit    Verified identification with patient. Secured video conference with RN presenter in [facility]      HPI:   Kimberly presents today with the following.    1. Cellulitis and abscess of hand  In October, patient was admitted to the hospital in [place] after sustaining a dog bite to her right hand.  Patient spent 1 week in the hospital with IV antibiotics.  Patient completed discharge antibiotics.  Patient underwent surgical I&D while in the hospital with Dr. Marroquin.  Patient had a follow-up appointment this Friday and was instructed with wound care wet-to-dry dressings.  Wound is healing well.  Patient denies any redness, pain or drainage.    2. Controlled type 2 diabetes mellitus without complication, without long-term current use of insulin (HCC)  Patient currently being treated with maximum doses of Invokana, metformin, Actos, glipizide. Patient has declined the use of insulin in the past. Blood sugar averages around 120. Patient does not experience low blood sugar levels. Patient is following the ADA diet online and is eating approximately 2002 2500-calorie a day.  Normal retinal scan in September  Hemoglobin 7.7, down from 8.5.  Patient taking Zocor 20 mg daily    3. Stented coronary artery  Patient was seen and evaluated by cardiology in October.  Patient is stable.  No medication changes.  Patient has not been able to complete cardiac stress test due to recent hospitalization.  Patient has quit smoking while in the hospital and doing well.    4. Arthritis/arthropathy of multiple joints  Patient complains of increasing joint pain in the winter mostly involving the low back, knees and hips.  Patient requesting prescription for ibuprofen.      Patient Active Problem List    Diagnosis Date Noted   • Cellulitis and abscess of hand 12/12/2018   • Arthritis/arthropathy of multiple joints 12/12/2018   • Stented coronary artery 10/10/2018   • Lipoma of torso 07/12/2018   • Abnormal  mammogram 03/22/2018   • Type 2 diabetes mellitus with ophthalmic complication ([facility]) 03/22/2018   • Exposure 09/20/2017   • BRBPR (bright red blood per rectum) 06/01/2017   • H/O colonoscopy with polypectomy 06/01/2017   • Essential hypertension 04/27/2017   • Dyslipidemia 04/27/2017   • Controlled type 2 diabetes mellitus without complication ([facility]) 04/27/2017   • Coronary artery disease involving native coronary artery of native heart without angina pectoris 04/27/2017   • Reactive depression 04/27/2017   • PAD (peripheral artery disease) ([facility]) 04/27/2017   • Hypoxemia 04/27/2017   • Tobacco abuse 04/27/2017       Current Outpatient Prescriptions   Medication Sig Dispense Refill   • meloxicam (MOBIC) 7.5 MG Tab Take 1 Tab by mouth every day. 30 Tab 5   • FLUoxetine (PROZAC) 20 MG Cap Take 1 Cap by mouth every day. 30 Cap 3   • metoprolol (LOPRESSOR) 25 MG Tab Take 1 Tab by mouth 2 times a day. 60 Tab 3   • clopidogrel (PLAVIX) 75 MG Tab Take 1 Tab by mouth every day. 30 Tab 5   • benazepril (LOTENSIN) 40 MG tablet Take 1 Tab by mouth every day. 30 Tab 5   • metformin (GLUCOPHAGE) 1000 MG tablet Take 1 Tab by mouth 2 times a day, with meals. Take one tablet orally every morning and one and one-half tablet at bedtime 60 Tab 5   • glipiZIDE (GLUCOTROL) 10 MG Tab Take 1 Tab by mouth 2 times a day. 180 Tab 2   • pioglitazone (ACTOS) 45 MG Tab Take 1 Tab by mouth every day. 30 Tab 5   • INVOKANA 300 MG Tab Take 300 mg by mouth every day. 30 Tab 5   • simvastatin (ZOCOR) 20 MG Tab Take 1 Tab by mouth every evening. 30 Tab 5   • aspirin (ASA) 81 MG Chew Tab chewable tablet Take 81 mg by mouth every day.     • nitroglycerin (NITROSTAT) 0.4 MG SL Tab Place 1 Tab under tongue as needed for Chest Pain. 25 Tab 1   • NON SPECIFIED Portable Oxygen  Dx: COPD 1 Each 11   • pioglitazone (ACTOS) 30 MG Tab      • ibuprofen (MOTRIN) 800 MG Tab        No current facility-administered medications for this visit.          Allergies as of  "12/12/2018 - Reviewed 12/12/2018   Allergen Reaction Noted   • Aldactone [kdc:yellow dye+spironolactone] Rash 04/27/2017   • Percocet [apap-fd&c red #40 [place]-oxycodone] Rash 04/27/2017        ROS: As per HPI.  Denies all cardiopulmonary, GI, neurologic symptoms.    /64 (BP Location: Right arm, Patient Position: Sitting)   Pulse 64   Temp 36.2 °C (97.2 °F) (Tympanic)   Ht 1.676 m (5' 6\")   Wt 74.6 kg (164 lb 8 oz)   SpO2 88%   BMI 26.55 kg/m²     Physical Exam:  Gen:         Alert and oriented, No apparent distress.  Neck:        No Lymphadenopathy or Bruits.  Lungs:     Clear to auscultation bilaterally, no wheezes rhonchi or crackles  CV:          Regular rate and rhythm. No murmurs, rubs or gallops.  Abd:         Soft non tender, non distended. Normal active bowel sounds.  No  Hepatosplenomegaly, No pulsatile masses.                   Ext:          No clubbing, cyanosis, edema.  Right hand with bandage, no drainage, no cellulitis, well-healed wound.      Assessment and Plan.   73 y.o. female with the following issues.    1. Cellulitis and abscess of hand  Continue with wet-to-dry dressing  Follow-up with surgeon as needed    2. Controlled type 2 diabetes mellitus without complication, without long-term current use of insulin (HCC)  Continue current plan of care  Hemoglobin A1c in 6 months    3. Stented coronary artery  Follow-up with Dr. Beasley after completion of cardiac stress test.  Stable, continue current plan of care    4. Arthritis/arthropathy of multiple joints  Trial with meloxicam 7.5 mg daily  May use Tylenol PM as needed    - meloxicam (MOBIC) 7.5 MG Tab; Take 1 Tab by mouth every day.  Dispense: 30 Tab; Refill: 5            Please note that this dictation was created using voice recognition software. I have made every reasonable attempt to correct obvious errors, but expect that there are errors of grammar and possible content that I did not discover before finalizing note.   "

## 2019-01-22 DIAGNOSIS — I25.10 CORONARY ARTERY DISEASE INVOLVING NATIVE CORONARY ARTERY OF NATIVE HEART WITHOUT ANGINA PECTORIS: ICD-10-CM

## 2019-01-22 RX ORDER — CLOPIDOGREL BISULFATE 75 MG/1
75 TABLET ORAL DAILY
Qty: 30 TAB | Refills: 5 | Status: SHIPPED | OUTPATIENT
Start: 2019-01-22 | End: 2019-05-23 | Stop reason: SDUPTHER

## 2019-02-04 ENCOUNTER — TELEPHONE (OUTPATIENT)
Dept: MEDICAL GROUP | Facility: CLINIC | Age: 74
End: 2019-02-04

## 2019-02-07 DIAGNOSIS — E11.9 CONTROLLED TYPE 2 DIABETES MELLITUS WITHOUT COMPLICATION, WITHOUT LONG-TERM CURRENT USE OF INSULIN (HCC): ICD-10-CM

## 2019-02-25 DIAGNOSIS — E78.5 DYSLIPIDEMIA: ICD-10-CM

## 2019-02-25 DIAGNOSIS — E11.21 CONTROLLED TYPE 2 DIABETES MELLITUS WITH DIABETIC NEPHROPATHY, WITHOUT LONG-TERM CURRENT USE OF INSULIN (HCC): ICD-10-CM

## 2019-02-25 RX ORDER — SIMVASTATIN 20 MG
20 TABLET ORAL EVERY EVENING
Qty: 30 TAB | Refills: 5 | Status: SHIPPED | OUTPATIENT
Start: 2019-02-25 | End: 2019-02-26 | Stop reason: SDUPTHER

## 2019-02-26 RX ORDER — SIMVASTATIN 20 MG
20 TABLET ORAL EVERY EVENING
Qty: 30 TAB | Refills: 5 | Status: SHIPPED | OUTPATIENT
Start: 2019-02-26 | End: 2019-05-23 | Stop reason: SDUPTHER

## 2019-02-26 NOTE — TELEPHONE ENCOUNTER
Was the patient seen in the last year in this department? Yes    Does patient have an active prescription for medications requested? Yes    Received Request Via: Pharmacy     Requested Prescriptions     Pending Prescriptions Disp Refills   • simvastatin (ZOCOR) 20 MG Tab 30 Tab 5     Sig: Take 1 Tab by mouth every evening.     Signed Prescriptions Disp Refills   • metformin (GLUCOPHAGE) 1000 MG tablet 60 Tab 5     Sig: Take 1 Tab by mouth 2 times a day, with meals. Take one tablet orally every morning and one and one-half tablet at bedtime     Authorizing Provider: VICKEY LOJA

## 2019-03-13 ENCOUNTER — TELEMEDICINE ORIGINATING SITE VISIT (OUTPATIENT)
Dept: MEDICAL GROUP | Facility: CLINIC | Age: 74
End: 2019-03-13
Payer: MEDICARE

## 2019-03-13 ENCOUNTER — NON-PROVIDER VISIT (OUTPATIENT)
Dept: MEDICAL GROUP | Facility: CLINIC | Age: 74
End: 2019-03-13
Payer: MEDICARE

## 2019-03-13 ENCOUNTER — TELEMEDICINE2 (OUTPATIENT)
Dept: MEDICAL GROUP | Age: 74
End: 2019-03-13
Payer: MEDICARE

## 2019-03-13 VITALS
BODY MASS INDEX: 28.61 KG/M2 | TEMPERATURE: 98.9 F | SYSTOLIC BLOOD PRESSURE: 122 MMHG | WEIGHT: 178 LBS | HEART RATE: 56 BPM | OXYGEN SATURATION: 89 % | DIASTOLIC BLOOD PRESSURE: 55 MMHG | RESPIRATION RATE: 16 BRPM | HEIGHT: 66 IN

## 2019-03-13 DIAGNOSIS — I25.10 CORONARY ARTERY DISEASE INVOLVING NATIVE CORONARY ARTERY OF NATIVE HEART WITHOUT ANGINA PECTORIS: ICD-10-CM

## 2019-03-13 DIAGNOSIS — E11.9 CONTROLLED TYPE 2 DIABETES MELLITUS WITHOUT COMPLICATION, UNSPECIFIED WHETHER LONG TERM INSULIN USE (HCC): ICD-10-CM

## 2019-03-13 DIAGNOSIS — R09.02 HYPOXEMIA: ICD-10-CM

## 2019-03-13 DIAGNOSIS — Z95.5 STENTED CORONARY ARTERY: ICD-10-CM

## 2019-03-13 DIAGNOSIS — R63.5 WEIGHT GAIN: ICD-10-CM

## 2019-03-13 DIAGNOSIS — F32.9 REACTIVE DEPRESSION: ICD-10-CM

## 2019-03-13 DIAGNOSIS — I10 ESSENTIAL HYPERTENSION: ICD-10-CM

## 2019-03-13 DIAGNOSIS — E11.9 CONTROLLED TYPE 2 DIABETES MELLITUS WITHOUT COMPLICATION, WITHOUT LONG-TERM CURRENT USE OF INSULIN (HCC): ICD-10-CM

## 2019-03-13 DIAGNOSIS — M12.9 ARTHRITIS/ARTHROPATHY OF MULTIPLE JOINTS: ICD-10-CM

## 2019-03-13 LAB
HBA1C MFR BLD: 6.4 % (ref ?–5.8)
INT CON NEG: NEGATIVE
INT CON POS: POSITIVE

## 2019-03-13 PROCEDURE — 99214 OFFICE O/P EST MOD 30 MIN: CPT | Performed by: INTERNAL MEDICINE

## 2019-03-13 PROCEDURE — 83036 HEMOGLOBIN GLYCOSYLATED A1C: CPT | Performed by: INTERNAL MEDICINE

## 2019-03-13 RX ORDER — PIOGLITAZONEHYDROCHLORIDE 45 MG/1
45 TABLET ORAL DAILY
Qty: 30 TAB | Refills: 5 | Status: SHIPPED | OUTPATIENT
Start: 2019-03-13 | End: 2019-10-16 | Stop reason: SDUPTHER

## 2019-03-13 RX ORDER — FLUOXETINE HYDROCHLORIDE 20 MG/1
20 CAPSULE ORAL DAILY
Qty: 30 CAP | Refills: 3 | Status: SHIPPED | OUTPATIENT
Start: 2019-03-13 | End: 2019-05-23 | Stop reason: SDUPTHER

## 2019-03-13 RX ORDER — GLIPIZIDE 10 MG/1
10 TABLET ORAL 2 TIMES DAILY
Qty: 180 TAB | Refills: 2 | Status: SHIPPED | OUTPATIENT
Start: 2019-03-13 | End: 2019-05-23 | Stop reason: SDUPTHER

## 2019-03-13 NOTE — PROGRESS NOTES
CC: Follow-up diabetes, arthritis    Verified identification with patient. Secured video conference with RN presenter in [facility]      HPI:   Kimberly presents today with the following.    1. Controlled type 2 diabetes mellitus without complication, without long-term current use of insulin (HCC)  Patient is compliant with her medications which include Invokana, metformin, Actos, glipizide at maximum doses.  Patient has declined insulin in the past.  Follow-up hemoglobin A1c 6.4 down from 7.7.  Patient has been following a very strict diabetic diet.  Labs completed in September 2018.  Total cholesterol 168, LDL 83.  Patient had an eye exam with an ophthalmologist on February 2019.  No diabetic retinopathy, showing beginnings of cataracts    2. Stented coronary artery  Patient has not been able to travel to [place] to follow-up with her cardiologist Dr. Beasley.  She has a pending stress test she has not been able to complete.  Patient denies PND, chest pain, shortness of breath, palpitations or edema.  Patient compliant in taking her aspirin and Plavix.  Good blood pressure control.    3. Hypoxemia  History of mild COPD, using oxygen at nighttime.  Repeat O2 saturations on room air 89 percent.  States that she has mild ANDERSON when walking to the mailbox otherwise feels good, no fatigue, no shortness of breath.  Patient quit cigarette smoking on October 22, 2018.  Chest x-ray and pulmonary function test completed in 2017.    4. Arthritis/arthropathy of multiple joints  Patient started taking meloxicam 7.5 mg daily and has helped with her joint pain especially in her hands and her knee.    5. Weight gain  Patient gained about 12-15 pounds since her last visit.  Patient attributes this to smoking cessation and sedentary lifestyle during the winter months.  Not getting outside due to cold weather.      Patient Active Problem List    Diagnosis Date Noted   • Cellulitis and abscess of hand 12/12/2018   • Arthritis/arthropathy  of multiple joints 12/12/2018   • Stented coronary artery 10/10/2018   • Lipoma of torso 07/12/2018   • Abnormal mammogram 03/22/2018   • Type 2 diabetes mellitus with ophthalmic complication ([facility]) 03/22/2018   • Exposure 09/20/2017   • BRBPR (bright red blood per rectum) 06/01/2017   • H/O colonoscopy with polypectomy 06/01/2017   • Essential hypertension 04/27/2017   • Dyslipidemia 04/27/2017   • Controlled type 2 diabetes mellitus without complication ([facility]) 04/27/2017   • Coronary artery disease involving native coronary artery of native heart without angina pectoris 04/27/2017   • Reactive depression 04/27/2017   • PAD (peripheral artery disease) ([facility]) 04/27/2017   • Hypoxemia 04/27/2017   • Tobacco abuse 04/27/2017       Current Outpatient Prescriptions   Medication Sig Dispense Refill   • simvastatin (ZOCOR) 20 MG Tab Take 1 Tab by mouth every evening. 30 Tab 5   • metformin (GLUCOPHAGE) 1000 MG tablet Take 1 Tab by mouth 2 times a day, with meals. Take one tablet orally every morning and one and one-half tablet at bedtime 60 Tab 5   • Empagliflozin 25 MG Tab Take 25 mg by mouth every day. 30 Tab 5   • clopidogrel (PLAVIX) 75 MG Tab Take 1 Tab by mouth every day. 30 Tab 5   • meloxicam (MOBIC) 7.5 MG Tab Take 1 Tab by mouth every day. 30 Tab 5   • FLUoxetine (PROZAC) 20 MG Cap Take 1 Cap by mouth every day. 30 Cap 3   • metoprolol (LOPRESSOR) 25 MG Tab Take 1 Tab by mouth 2 times a day. 60 Tab 3   • benazepril (LOTENSIN) 40 MG tablet Take 1 Tab by mouth every day. 30 Tab 5   • glipiZIDE (GLUCOTROL) 10 MG Tab Take 1 Tab by mouth 2 times a day. 180 Tab 2   • nitroglycerin (NITROSTAT) 0.4 MG SL Tab Place 1 Tab under tongue as needed for Chest Pain. 25 Tab 1   • pioglitazone (ACTOS) 45 MG Tab Take 1 Tab by mouth every day. 30 Tab 5   • INVOKANA 300 MG Tab Take 300 mg by mouth every day. 30 Tab 5   • NON SPECIFIED Portable Oxygen  Dx: COPD 1 Each 11   • pioglitazone (ACTOS) 30 MG Tab      • ibuprofen (MOTRIN) 800  "MG Tab      • aspirin (ASA) 81 MG Chew Tab chewable tablet Take 81 mg by mouth every day.       No current facility-administered medications for this visit.          Allergies as of 03/13/2019 - Reviewed 03/13/2019   Allergen Reaction Noted   • Aldactone [kdc:yellow dye+spironolactone] Rash 04/27/2017   • Percocet [apap-fd&c red #40 [place]-oxycodone] Rash 04/27/2017        ROS: As per HPI.  Denies all other cardiopulmonary, GI, neurologic symptoms.    /55 (BP Location: Right arm, Patient Position: Sitting)   Pulse (!) 56   Temp 37.2 °C (98.9 °F) (Temporal)   Resp 16   Ht 1.676 m (5' 6\")   Wt 80.7 kg (178 lb)   SpO2 89%   BMI 28.73 kg/m²     Physical Exam:  Gen:         Alert and oriented, No apparent distress.  Neck:        No Lymphadenopathy or Bruits.  Lungs:     Clear to auscultation bilaterally, no wheezes rhonchi or crackles  CV:          Regular rate and rhythm. No murmurs, rubs or gallops.  Abd:         Soft non tender, non distended. Normal active bowel sounds.  No  Hepatosplenomegaly, No pulsatile masses.                   Ext:          No clubbing, cyanosis, edema.      Assessment and Plan.   73 y.o. female with the following issues.    1. Controlled type 2 diabetes mellitus without complication, without long-term current use of insulin (HCC)  Stable approved hemoglobin A1c  Continue current plan of care  Recheck hemoglobin A1c in 6-8 months.    2. Stented coronary artery  Patient will make follow-up appointment with cardiologist, Dr. Beasley in [place].  Cardiac stress test June.    3. Hypoxemia  Recommend  Pulmonary function tests and chest x-rays reviewed.    4. Arthritis/arthropathy of multiple joints  Patient will continue meloxicam 7.5 mg daily    5. Weight gain  Increased physical activity, springtime.  Recommend 30-40 minutes of aerobic activity such as walking daily.  Most likely related to smoking cessation as in the past            Please note that this dictation was created using " voice recognition software. I have made every reasonable attempt to correct obvious errors, but expect that there are errors of grammar and possible content that I did not discover before finalizing note.

## 2019-03-13 NOTE — TELEPHONE ENCOUNTER
Was the patient seen in the last year in this department? Yes    Does patient have an active prescription for medications requested? Yes    Received Request Via: Patient     Requested Prescriptions     Pending Prescriptions Disp Refills   • glipiZIDE (GLUCOTROL) 10 MG Tab 180 Tab 2     Sig: Take 1 Tab by mouth 2 times a day.   • metoprolol (LOPRESSOR) 25 MG Tab 60 Tab 3     Sig: Take 1 Tab by mouth 2 times a day.   • FLUoxetine (PROZAC) 20 MG Cap 30 Cap 3     Sig: Take 1 Cap by mouth every day.   • pioglitazone (ACTOS) 45 MG Tab 30 Tab 5     Sig: Take 1 Tab by mouth every day.

## 2019-04-08 DIAGNOSIS — I10 ESSENTIAL HYPERTENSION: ICD-10-CM

## 2019-04-08 RX ORDER — BENAZEPRIL HYDROCHLORIDE 40 MG/1
40 TABLET ORAL DAILY
Qty: 30 TAB | Refills: 5 | Status: SHIPPED | OUTPATIENT
Start: 2019-04-08 | End: 2019-10-16 | Stop reason: SDUPTHER

## 2019-05-13 DIAGNOSIS — E11.21 CONTROLLED TYPE 2 DIABETES MELLITUS WITH DIABETIC NEPHROPATHY, WITHOUT LONG-TERM CURRENT USE OF INSULIN (HCC): ICD-10-CM

## 2019-05-21 DIAGNOSIS — E11.21 CONTROLLED TYPE 2 DIABETES MELLITUS WITH DIABETIC NEPHROPATHY, WITHOUT LONG-TERM CURRENT USE OF INSULIN (HCC): ICD-10-CM

## 2019-05-21 NOTE — TELEPHONE ENCOUNTER
Was the patient seen in the last year in this department? Yes    Does patient have an active prescription for medications requested? Yes    Received Request Via: Pharmacy     Requested Prescriptions     Pending Prescriptions Disp Refills   • metformin (GLUCOPHAGE) 1000 MG tablet 90 Tab 5     Sig: Take one tablet orally every morning and one and one-half tablet at bedtime

## 2019-05-23 DIAGNOSIS — I25.10 CORONARY ARTERY DISEASE INVOLVING NATIVE CORONARY ARTERY OF NATIVE HEART WITHOUT ANGINA PECTORIS: ICD-10-CM

## 2019-05-23 DIAGNOSIS — F32.9 REACTIVE DEPRESSION: ICD-10-CM

## 2019-05-23 DIAGNOSIS — M12.9 ARTHRITIS/ARTHROPATHY OF MULTIPLE JOINTS: ICD-10-CM

## 2019-05-23 DIAGNOSIS — I10 ESSENTIAL HYPERTENSION: ICD-10-CM

## 2019-05-23 DIAGNOSIS — E11.9 CONTROLLED TYPE 2 DIABETES MELLITUS WITHOUT COMPLICATION, WITHOUT LONG-TERM CURRENT USE OF INSULIN (HCC): ICD-10-CM

## 2019-05-23 DIAGNOSIS — E78.5 DYSLIPIDEMIA: ICD-10-CM

## 2019-05-23 RX ORDER — MELOXICAM 7.5 MG/1
7.5 TABLET ORAL DAILY
Qty: 30 TAB | Refills: 5 | Status: SHIPPED | OUTPATIENT
Start: 2019-05-23 | End: 2019-10-16 | Stop reason: SDUPTHER

## 2019-05-23 RX ORDER — CLOPIDOGREL BISULFATE 75 MG/1
75 TABLET ORAL DAILY
Qty: 30 TAB | Refills: 5 | Status: SHIPPED | OUTPATIENT
Start: 2019-05-23 | End: 2020-01-02 | Stop reason: SDUPTHER

## 2019-05-23 RX ORDER — FLUOXETINE HYDROCHLORIDE 20 MG/1
20 CAPSULE ORAL DAILY
Qty: 30 CAP | Refills: 3 | Status: SHIPPED | OUTPATIENT
Start: 2019-05-23 | End: 2019-10-16 | Stop reason: SDUPTHER

## 2019-05-23 RX ORDER — GLIPIZIDE 10 MG/1
10 TABLET ORAL 2 TIMES DAILY
Qty: 180 TAB | Refills: 2 | Status: SHIPPED | OUTPATIENT
Start: 2019-05-23 | End: 2020-02-13 | Stop reason: SDUPTHER

## 2019-05-23 RX ORDER — SIMVASTATIN 20 MG
20 TABLET ORAL EVERY EVENING
Qty: 30 TAB | Refills: 5 | Status: SHIPPED | OUTPATIENT
Start: 2019-05-23 | End: 2020-02-13 | Stop reason: SDUPTHER

## 2019-05-23 NOTE — TELEPHONE ENCOUNTER
Was the patient seen in the last year in this department? Yes    Does patient have an active prescription for medications requested? Yes    Received Request Via: Pharmacy     Requested Prescriptions     Pending Prescriptions Disp Refills   • simvastatin (ZOCOR) 20 MG Tab 30 Tab 5     Sig: Take 1 Tab by mouth every evening.   • glipiZIDE (GLUCOTROL) 10 MG Tab 180 Tab 2     Sig: Take 1 Tab by mouth 2 times a day.   • clopidogrel (PLAVIX) 75 MG Tab 30 Tab 5     Sig: Take 1 Tab by mouth every day.   • FLUoxetine (PROZAC) 20 MG Cap 30 Cap 3     Sig: Take 1 Cap by mouth every day.   • meloxicam (MOBIC) 7.5 MG Tab 30 Tab 5     Sig: Take 1 Tab by mouth every day.   • metoprolol (LOPRESSOR) 25 MG Tab 60 Tab 3     Sig: Take 1 Tab by mouth 2 times a day.

## 2019-05-31 ENCOUNTER — TELEPHONE (OUTPATIENT)
Dept: MEDICAL GROUP | Facility: CLINIC | Age: 74
End: 2019-05-31

## 2019-06-03 ENCOUNTER — OFFICE VISIT (OUTPATIENT)
Dept: MEDICAL GROUP | Facility: CLINIC | Age: 74
End: 2019-06-03
Payer: MEDICARE

## 2019-06-03 VITALS
SYSTOLIC BLOOD PRESSURE: 176 MMHG | HEART RATE: 54 BPM | HEIGHT: 66 IN | DIASTOLIC BLOOD PRESSURE: 63 MMHG | TEMPERATURE: 98.9 F | OXYGEN SATURATION: 93 % | BODY MASS INDEX: 28.77 KG/M2 | WEIGHT: 179 LBS

## 2019-06-03 DIAGNOSIS — E11.311 TYPE 2 DIABETES MELLITUS WITH LEFT EYE AFFECTED BY RETINOPATHY AND MACULAR EDEMA, WITHOUT LONG-TERM CURRENT USE OF INSULIN, UNSPECIFIED RETINOPATHY SEVERITY (HCC): ICD-10-CM

## 2019-06-03 DIAGNOSIS — B02.8 HERPES ZOSTER WITH COMPLICATION: ICD-10-CM

## 2019-06-03 DIAGNOSIS — I10 ESSENTIAL HYPERTENSION: ICD-10-CM

## 2019-06-03 DIAGNOSIS — H65.92 LEFT NON-SUPPURATIVE OTITIS MEDIA: ICD-10-CM

## 2019-06-03 DIAGNOSIS — E83.42 HYPOMAGNESEMIA: ICD-10-CM

## 2019-06-03 DIAGNOSIS — E78.5 DYSLIPIDEMIA: ICD-10-CM

## 2019-06-03 PROCEDURE — 99203 OFFICE O/P NEW LOW 30 MIN: CPT | Performed by: PHYSICIAN ASSISTANT

## 2019-06-03 RX ORDER — ACYCLOVIR 800 MG/1
800 TABLET ORAL
Qty: 35 TAB | Refills: 0 | Status: SHIPPED | OUTPATIENT
Start: 2019-06-03 | End: 2019-06-10

## 2019-06-03 RX ORDER — AMOXICILLIN 875 MG/1
875 TABLET, COATED ORAL 2 TIMES DAILY
Qty: 20 TAB | Refills: 0 | Status: SHIPPED | OUTPATIENT
Start: 2019-06-03 | End: 2019-06-13

## 2019-06-03 RX ORDER — GABAPENTIN 100 MG/1
100 CAPSULE ORAL 3 TIMES DAILY
Qty: 90 CAP | Refills: 2 | Status: SHIPPED | OUTPATIENT
Start: 2019-06-03 | End: 2019-06-10

## 2019-06-03 NOTE — PROGRESS NOTES
cc:  Ear pain    Subjective:     Kimberly Castañeda is a 73 y.o. female presenting for ear pain      Patient presents to office today for ear pain.  She has had ear pain in the left ear for 2 days.  She feels that it is throbbing and sharp.  It is a constant pain.  She feels the ear is plugged.  She has tried heat and cold and peroxide, dimetapp, warm oil, ibuprofen and aspirin.  Nothing has helped.  It is sore to the touch.  She denies fever and chills.  She woke up yesterday morning with a sore throat on the left side.  The pain is tolerable until she feels the ice pick sensation.      Patient does have a history of hypertension, controlled type 2 diabetes and stented coronary artery.  She has an appt with her PCP in August and will be due for labs.  She would also like to check a magnesium level as she takes magnesium for hypomagnesemia.  She did quit smoking in October.       Review of systems:  See above. She denies any other symptoms unless previously indicated.        Current Outpatient Prescriptions:   •  acyclovir (ZOVIRAX) 800 MG Tab, Take 1 Tab by mouth 5 Times a Day for 7 days., Disp: 35 Tab, Rfl: 0  •  gabapentin (NEURONTIN) 100 MG Cap, Take 1 Cap by mouth 3 times a day., Disp: 90 Cap, Rfl: 2  •  amoxicillin (AMOXIL) 875 MG tablet, Take 1 Tab by mouth 2 times a day for 10 days., Disp: 20 Tab, Rfl: 0  •  simvastatin (ZOCOR) 20 MG Tab, Take 1 Tab by mouth every evening., Disp: 30 Tab, Rfl: 5  •  glipiZIDE (GLUCOTROL) 10 MG Tab, Take 1 Tab by mouth 2 times a day., Disp: 180 Tab, Rfl: 2  •  clopidogrel (PLAVIX) 75 MG Tab, Take 1 Tab by mouth every day., Disp: 30 Tab, Rfl: 5  •  FLUoxetine (PROZAC) 20 MG Cap, Take 1 Cap by mouth every day., Disp: 30 Cap, Rfl: 3  •  meloxicam (MOBIC) 7.5 MG Tab, Take 1 Tab by mouth every day., Disp: 30 Tab, Rfl: 5  •  metoprolol (LOPRESSOR) 25 MG Tab, Take 1 Tab by mouth 2 times a day., Disp: 60 Tab, Rfl: 3  •  metformin (GLUCOPHAGE) 1000 MG tablet, Take one tablet orally every  "morning and one and one-half tablet at bedtime, Disp: 90 Tab, Rfl: 5  •  benazepril (LOTENSIN) 40 MG tablet, Take 1 Tab by mouth every day., Disp: 30 Tab, Rfl: 5  •  Empagliflozin 25 MG Tab, Take 25 mg by mouth every day., Disp: 30 Tab, Rfl: 5  •  nitroglycerin (NITROSTAT) 0.4 MG SL Tab, Place 1 Tab under tongue as needed for Chest Pain., Disp: 25 Tab, Rfl: 1  •  pioglitazone (ACTOS) 30 MG Tab, , Disp: , Rfl:   •  aspirin (ASA) 81 MG Chew Tab chewable tablet, Take 81 mg by mouth every day., Disp: , Rfl:   •  pioglitazone (ACTOS) 45 MG Tab, Take 1 Tab by mouth every day., Disp: 30 Tab, Rfl: 5  •  NON SPECIFIED, Portable Oxygen Dx: COPD, Disp: 1 Each, Rfl: 11    Allergies, past medical history, past surgical history, family history, social history reviewed and updated    Objective:     Vitals: BP (!) 176/63 (BP Location: Right arm, Patient Position: Sitting)   Pulse (!) 54   Temp 37.2 °C (98.9 °F) (Temporal)   Ht 1.676 m (5' 6\")   Wt 81.2 kg (179 lb)   SpO2 93%   BMI 28.89 kg/m²   General: Alert, pleasant, NAD  EYES:   PERRL, EOMI, no icterus or pallor.  Conjunctivae and lids normal.   HENT:  Normocephalic.  External ears normal. Difficult to see in the left ear canal which is erythematous and swollen.  Pain upon palpation of tragus.  No pain upon palpation mastoid process.  Right ear canal is within normal limits.  Patient has a vesicular rash starting at the top of her head and going down the left side of her face down to her chin.  No lesion on nose.  Oropharynx non-erythematous, mucous membranes moist.  Neck supple.    Abdomen: Not obese  Skin: Warm, dry,  Shingles type rash on left side of face  Musculoskeletal: Gait is normal.  Moves all extremities well.  Neurological: No tremors, sensation grossly intact,  CN2-12 intact,   Psych:  Affect/mood is normal, judgement is good, memory is intact, grooming is appropriate.    Assessment/Plan:     Kimberly was seen today for otalgia.    Diagnoses and all orders for " this visit:        Herpes zoster with complication  -     acyclovir (ZOVIRAX) 800 MG Tab; Take 1 Tab by mouth 5 Times a Day for 7 days.  -     gabapentin (NEURONTIN) 100 MG Cap; Take 1 Cap by mouth 3 times a day.  -     REFERRAL TO OPHTHALMOLOGY  Left non-suppurative otitis media  -     amoxicillin (AMOXIL) 875 MG tablet; Take 1 Tab by mouth 2 times a day for 10 days.    Patient is still concerned that she may have otitis media although I do believe the erythema is most likely related to a shingles outbreak.  I will provide her with amoxicillin but believe the acyclovir provided will provide the most help.  Will add gabapentin with instructions on use.  Although no sign on tip of nose at this time, concerned of possible eye affect and will refer to ophthalmology as an urgent referral.  Will recheck in 1 week.  Patient advised to avoid individuals who may be immunocompromised or in need of vaccines.  Advised that this can be contagious.    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  Labs have been ordered to evaluate further.  Believe patient's elevated blood pressure level is due to pain response and we will recheck when she returns in 1 week.    Return in about 1 week (around 6/10/2019).    Please note that this dictation  was created using voice recognition software. I have made every reasonable attempt to correct obvious errors, but expect that there are errors of grammar and possible content that I did not discover before finalizing note.

## 2019-06-10 ENCOUNTER — OFFICE VISIT (OUTPATIENT)
Dept: MEDICAL GROUP | Facility: CLINIC | Age: 74
End: 2019-06-10
Payer: MEDICARE

## 2019-06-10 VITALS
HEART RATE: 61 BPM | SYSTOLIC BLOOD PRESSURE: 101 MMHG | TEMPERATURE: 98.5 F | BODY MASS INDEX: 28.77 KG/M2 | HEIGHT: 66 IN | OXYGEN SATURATION: 85 % | WEIGHT: 179 LBS | DIASTOLIC BLOOD PRESSURE: 62 MMHG

## 2019-06-10 DIAGNOSIS — B02.8 HERPES ZOSTER WITH COMPLICATION: ICD-10-CM

## 2019-06-10 PROCEDURE — 99213 OFFICE O/P EST LOW 20 MIN: CPT | Performed by: PHYSICIAN ASSISTANT

## 2019-06-10 RX ORDER — GABAPENTIN 300 MG/1
300 CAPSULE ORAL 3 TIMES DAILY
Qty: 90 CAP | Refills: 2 | Status: SHIPPED | OUTPATIENT
Start: 2019-06-10 | End: 2019-06-24

## 2019-06-10 NOTE — PROGRESS NOTES
cc:  Follow up shingles    Subjective:     Kimberly Castañeda is a 73 y.o. female presenting for follow up shingles      Patient presents the office today for follow-up shingles.Patient indicates that she is doing a little bit better since she was seen.  However the breakout was much worse and she still has the ice pick pain most prominent in her ear but on the left side of her face.  The gabapentin with Tylenol seems to help a little bit but nothing else has really helped.  She is also noticed that if she uses an ice pack this will help her pain some.  She has had symptoms for over a week.  Denies any in symptoms including fevers and chills.  She does admit to feeling short of breath but states that she has not been using her oxygen today.    Review of systems:  See above.  Denies any other symptoms unless previously indicated.      Current Outpatient Prescriptions:   •  gabapentin (NEURONTIN) 300 MG Cap, Take 1 Cap by mouth 3 times a day., Disp: 90 Cap, Rfl: 2  •  acyclovir (ZOVIRAX) 800 MG Tab, Take 1 Tab by mouth 5 Times a Day for 7 days., Disp: 35 Tab, Rfl: 0  •  amoxicillin (AMOXIL) 875 MG tablet, Take 1 Tab by mouth 2 times a day for 10 days., Disp: 20 Tab, Rfl: 0  •  simvastatin (ZOCOR) 20 MG Tab, Take 1 Tab by mouth every evening., Disp: 30 Tab, Rfl: 5  •  glipiZIDE (GLUCOTROL) 10 MG Tab, Take 1 Tab by mouth 2 times a day., Disp: 180 Tab, Rfl: 2  •  clopidogrel (PLAVIX) 75 MG Tab, Take 1 Tab by mouth every day., Disp: 30 Tab, Rfl: 5  •  FLUoxetine (PROZAC) 20 MG Cap, Take 1 Cap by mouth every day., Disp: 30 Cap, Rfl: 3  •  meloxicam (MOBIC) 7.5 MG Tab, Take 1 Tab by mouth every day., Disp: 30 Tab, Rfl: 5  •  metoprolol (LOPRESSOR) 25 MG Tab, Take 1 Tab by mouth 2 times a day., Disp: 60 Tab, Rfl: 3  •  metformin (GLUCOPHAGE) 1000 MG tablet, Take one tablet orally every morning and one and one-half tablet at bedtime, Disp: 90 Tab, Rfl: 5  •  benazepril (LOTENSIN) 40 MG tablet, Take 1 Tab by mouth every day.,  "Disp: 30 Tab, Rfl: 5  •  pioglitazone (ACTOS) 45 MG Tab, Take 1 Tab by mouth every day., Disp: 30 Tab, Rfl: 5  •  Empagliflozin 25 MG Tab, Take 25 mg by mouth every day., Disp: 30 Tab, Rfl: 5  •  aspirin (ASA) 81 MG Chew Tab chewable tablet, Take 81 mg by mouth every day., Disp: , Rfl:   •  nitroglycerin (NITROSTAT) 0.4 MG SL Tab, Place 1 Tab under tongue as needed for Chest Pain., Disp: 25 Tab, Rfl: 1  •  NON SPECIFIED, Portable Oxygen Dx: COPD, Disp: 1 Each, Rfl: 11    Allergies, past medical history, past surgical history, family history, social history reviewed and updated    Objective:     Vitals: /62 (BP Location: Left arm, Patient Position: Sitting)   Pulse 61   Temp 36.9 °C (98.5 °F) (Temporal)   Ht 1.676 m (5' 6\")   Wt 81.2 kg (179 lb)   SpO2 (!) 85%   BMI 28.89 kg/m²    General: Alert, pleasant, NAD  EYES:   PERRL, EOMI, no icterus or pallor.  Conjunctivae and lids normal.   HENT:  Normocephalic.  External ears normal.  Left ear canal appears less erythematous.  Shingles present inside left ear on pinna and also in the ear canal.  Neck supple.   Abdomen: Overweight  Skin: Warm, dry, shingles outbreak left side of face following the lower branch of the facial nerve.  Musculoskeletal: Gait is normal.  Moves all extremities well.  Neurological: No tremors, sensation grossly intact, CN2-12 intact.  Psych:  Affect/mood is normal, judgement is good, memory is intact, grooming is appropriate.    Assessment/Plan:     Kimberly was seen today for follow-up.    Diagnoses and all orders for this visit:    Herpes zoster with complication  -     gabapentin (NEURONTIN) 300 MG Cap; Take 1 Cap by mouth 3 times a day.    We will increase the gabapentin to 300 mg 3 times a day.  We will follow-up in 2 weeks, sooner if needed.  Patient is still considered contagious is not all lesions have scabbed over.  This is important that she will need to stay away from her pregnant granddaughter at this time.  Patient verbalizes " understanding.  She will contact us sooner if needed or if medication is not controlling pain.    Return in about 2 weeks (around 6/24/2019), or if symptoms worsen or fail to improve.    Please note that this dictation was created using voice recognition software. I have made every reasonable attempt to correct obvious errors, but expect that there are errors of grammar and possible content that I did not discover before finalizing note.

## 2019-06-24 ENCOUNTER — OFFICE VISIT (OUTPATIENT)
Dept: MEDICAL GROUP | Facility: CLINIC | Age: 74
End: 2019-06-24
Payer: MEDICARE

## 2019-06-24 VITALS
OXYGEN SATURATION: 93 % | WEIGHT: 180 LBS | TEMPERATURE: 97.9 F | BODY MASS INDEX: 28.93 KG/M2 | HEIGHT: 66 IN | SYSTOLIC BLOOD PRESSURE: 123 MMHG | DIASTOLIC BLOOD PRESSURE: 55 MMHG | HEART RATE: 59 BPM

## 2019-06-24 DIAGNOSIS — B02.8 HERPES ZOSTER WITH COMPLICATION: ICD-10-CM

## 2019-06-24 PROCEDURE — 99213 OFFICE O/P EST LOW 20 MIN: CPT | Performed by: PHYSICIAN ASSISTANT

## 2019-06-24 NOTE — PROGRESS NOTES
cc:  shingles    Subjective:     Kimberly Castañeda is a 73 y.o. female presenting for follow up shingles      Patient presents to follow up with shingles.  This initially started at the beginning of June.  Patient feels that she is doing 80-90% better.  She states that her left ear will still have an ice pick sensation and feel like it is full but has improved.  She has been taking tylenol instead of gabapentin and it is working for her.  She states she has not taken it this week.  All of her lesions have healed at this time. She denies any other symptoms at this time.  She still has pain in her neck and feels like it is in her throat.  It was constant, but is now more intermittent.      Patient indicates that she had a hysterectomy approximately 10 years ago but is not sure if she had cervix removed at the time.  She is requesting a Pap smear for a future appointment.    Review of systems:  See above.       Current Outpatient Prescriptions:   •  simvastatin (ZOCOR) 20 MG Tab, Take 1 Tab by mouth every evening., Disp: 30 Tab, Rfl: 5  •  glipiZIDE (GLUCOTROL) 10 MG Tab, Take 1 Tab by mouth 2 times a day., Disp: 180 Tab, Rfl: 2  •  clopidogrel (PLAVIX) 75 MG Tab, Take 1 Tab by mouth every day., Disp: 30 Tab, Rfl: 5  •  FLUoxetine (PROZAC) 20 MG Cap, Take 1 Cap by mouth every day., Disp: 30 Cap, Rfl: 3  •  meloxicam (MOBIC) 7.5 MG Tab, Take 1 Tab by mouth every day., Disp: 30 Tab, Rfl: 5  •  metoprolol (LOPRESSOR) 25 MG Tab, Take 1 Tab by mouth 2 times a day., Disp: 60 Tab, Rfl: 3  •  metformin (GLUCOPHAGE) 1000 MG tablet, Take one tablet orally every morning and one and one-half tablet at bedtime, Disp: 90 Tab, Rfl: 5  •  benazepril (LOTENSIN) 40 MG tablet, Take 1 Tab by mouth every day., Disp: 30 Tab, Rfl: 5  •  pioglitazone (ACTOS) 45 MG Tab, Take 1 Tab by mouth every day., Disp: 30 Tab, Rfl: 5  •  Empagliflozin 25 MG Tab, Take 25 mg by mouth every day., Disp: 30 Tab, Rfl: 5  •  aspirin (ASA) 81 MG Chew Tab chewable  "tablet, Take 81 mg by mouth every day., Disp: , Rfl:   •  nitroglycerin (NITROSTAT) 0.4 MG SL Tab, Place 1 Tab under tongue as needed for Chest Pain., Disp: 25 Tab, Rfl: 1  •  NON SPECIFIED, Portable Oxygen Dx: COPD, Disp: 1 Each, Rfl: 11    Allergies, past medical history, past surgical history, family history, social history reviewed and updated    Objective:     Vitals: /55 (BP Location: Right arm, Patient Position: Sitting)   Pulse (!) 59   Temp 36.6 °C (97.9 °F) (Temporal)   Ht 1.676 m (5' 6\")   Wt 81.6 kg (180 lb)   SpO2 93%   BMI 29.05 kg/m²    General: Alert, pleasant, NAD  EYES:   PERRL, EOMI, no icterus or pallor.  Conjunctivae and lids normal.   HENT:  Normocephalic.  External ears normal.  Ear canals appear normal.  There are no shingles type lesions on the side of the face-left side.  Shingles appears to have healed.  No nasal drainage present.  Neck supple.   Abdomen: Not obese  Skin: Warm, dry, no rashes.  Shingles resolved  Musculoskeletal: Gait is normal.  Moves all extremities well.  Neurological: No tremors, sensation grossly intact, CN2-12 intact.  Psych:  Affect/mood is normal, judgement is good, memory is intact, grooming is appropriate.    Assessment/Plan:     Kimberly was seen today for follow-up.    Diagnoses and all orders for this visit:    Herpes zoster with complication    Improved.  Patient is still having some residual neuropathy in her neck throat and ear.  I believe that this will continue to improve as she is seen significant improvement at this time.  We will follow-up in 4 weeks for reevaluation.  Do believe that she can spend time with her pregnant granddaughter as all lesions have healed and are cleared at this time.  At 4-week follow-up will proceed with Pap.  Patient is an age where she does not necessarily need to proceed but she would like to.    Return in about 4 weeks (around 7/22/2019) for appt for pap and shingles.    Please note that this dictation was created " using voice recognition software. I have made every reasonable attempt to correct obvious errors, but expect that there are errors of grammar and possible content that I did not discover before finalizing note.

## 2019-07-31 ENCOUNTER — OFFICE VISIT (OUTPATIENT)
Dept: MEDICAL GROUP | Facility: CLINIC | Age: 74
End: 2019-07-31
Payer: MEDICARE

## 2019-07-31 VITALS
HEIGHT: 66 IN | HEART RATE: 77 BPM | DIASTOLIC BLOOD PRESSURE: 60 MMHG | OXYGEN SATURATION: 96 % | WEIGHT: 183 LBS | RESPIRATION RATE: 16 BRPM | BODY MASS INDEX: 29.41 KG/M2 | TEMPERATURE: 98.1 F | SYSTOLIC BLOOD PRESSURE: 111 MMHG

## 2019-07-31 DIAGNOSIS — B02.8 HERPES ZOSTER WITH COMPLICATION: ICD-10-CM

## 2019-07-31 DIAGNOSIS — L82.1 SEBORRHEIC KERATOSIS: ICD-10-CM

## 2019-07-31 DIAGNOSIS — D17.1 LIPOMA OF TORSO: ICD-10-CM

## 2019-07-31 DIAGNOSIS — Z12.4 PAP SMEAR FOR CERVICAL CANCER SCREENING: ICD-10-CM

## 2019-07-31 PROCEDURE — 99213 OFFICE O/P EST LOW 20 MIN: CPT | Mod: 25 | Performed by: PHYSICIAN ASSISTANT

## 2019-07-31 PROCEDURE — G0101 CA SCREEN;PELVIC/BREAST EXAM: HCPCS | Performed by: PHYSICIAN ASSISTANT

## 2019-07-31 NOTE — PROGRESS NOTES
cc:  Pap smear.      Subjective:     Kimberly Castañeda is a 73 y.o. female presenting for pap smear      Patient presents to the office today for a pap smear.  She did have a hysterectomy but is unsure if her cervix was removed.  She still has pain and discomfort with her left ear.  She has recently had shingles on the left side of her face.  She does feel some improvement but it does still bother her.  She does have seborrheic keratoses but is not wanting treatment at this time.      Review of systems:  See above. Denies any other symptoms unless previously indicated.      Current Outpatient Medications:   •  simvastatin (ZOCOR) 20 MG Tab, Take 1 Tab by mouth every evening., Disp: 30 Tab, Rfl: 5  •  glipiZIDE (GLUCOTROL) 10 MG Tab, Take 1 Tab by mouth 2 times a day., Disp: 180 Tab, Rfl: 2  •  clopidogrel (PLAVIX) 75 MG Tab, Take 1 Tab by mouth every day., Disp: 30 Tab, Rfl: 5  •  FLUoxetine (PROZAC) 20 MG Cap, Take 1 Cap by mouth every day., Disp: 30 Cap, Rfl: 3  •  meloxicam (MOBIC) 7.5 MG Tab, Take 1 Tab by mouth every day., Disp: 30 Tab, Rfl: 5  •  metoprolol (LOPRESSOR) 25 MG Tab, Take 1 Tab by mouth 2 times a day., Disp: 60 Tab, Rfl: 3  •  metformin (GLUCOPHAGE) 1000 MG tablet, Take one tablet orally every morning and one and one-half tablet at bedtime, Disp: 90 Tab, Rfl: 5  •  benazepril (LOTENSIN) 40 MG tablet, Take 1 Tab by mouth every day., Disp: 30 Tab, Rfl: 5  •  pioglitazone (ACTOS) 45 MG Tab, Take 1 Tab by mouth every day., Disp: 30 Tab, Rfl: 5  •  Empagliflozin 25 MG Tab, Take 25 mg by mouth every day., Disp: 30 Tab, Rfl: 5  •  nitroglycerin (NITROSTAT) 0.4 MG SL Tab, Place 1 Tab under tongue as needed for Chest Pain., Disp: 25 Tab, Rfl: 1  •  NON SPECIFIED, Portable Oxygen Dx: COPD, Disp: 1 Each, Rfl: 11  •  aspirin (ASA) 81 MG Chew Tab chewable tablet, Take 81 mg by mouth every day., Disp: , Rfl:     Allergies, past medical history, past surgical history, family history, social history reviewed and  "updated    Objective:     Vitals: /60 (BP Location: Right arm, Patient Position: Sitting, BP Cuff Size: Adult)   Pulse 77   Temp 36.7 °C (98.1 °F) (Temporal)   Resp 16   Ht 1.676 m (5' 6\")   Wt 83 kg (183 lb)   SpO2 96%   BMI 29.54 kg/m²   General: Alert, pleasant, NAD  EYES:   PERRL, EOMI, no icterus or pallor.  Conjunctivae and lids normal.   HENT:  Normocephalic.  External ears normal. Tympanic membranes pearly, opaque.  No nasal drainage present.   No cervical or supraclavicular lymphadenopathy. Lipoma between neck and left breast at least 5 cm in diameter.   Abdomen: Non-distended, soft, non-tender.  Skin: Warm, dry, no rashes.  Seborrheic keratoses present.  Multiple.  Musculoskeletal: Gait is normal.  Moves all extremities well.  Breasts:  Symmetrical, nontender, without masses or nipple discharge.    Pelvic exam: Normal external genitalia including urethral meatus.  Well supported, nontender urethra and bladder. Bladder prolapse with surgical repair  Vagina and cervix without significant discharge or lesions.  No cervical motion tenderness.   No adnexal masses or tenderness.     Rectal:  Normal external anus, with hemorrhoids.    Neurological: No tremors, sensation grossly intact,  CN2-12 intact.  Psych:  Affect/mood is normal, judgement is good, memory is intact, grooming is appropriate.    Assessment/Plan:     Kimberly was seen today for gynecologic exam.    Diagnoses and all orders for this visit:    Pap smear for cervical cancer screening    Cervical specimen collected and will be sent to the lab for analysis.  Will notify patient of results when received.    Seborrheic keratosis  Patient is not wanting treatment at this time.  We will continue to monitor.    Herpes zoster with complication  Still having symptoms.  We will have patient start back on her gabapentin 300 mg twice a day and see if this will help with the sensation she is having with her ear.  If this is not helpful, patient to " contact clinic, may need to consider ENT if not resolving.    Lipoma    Patient is not wanting surgical intervention at this time.  She will let us know if she reconsiders.      Return if symptoms worsen or fail to improve.    Please note that this dictation was created using voice recognition software. I have made every reasonable attempt to correct obvious errors, but expect that there are errors of grammar and possible content that I did not discover before finalizing note.

## 2019-08-08 DIAGNOSIS — E11.9 CONTROLLED TYPE 2 DIABETES MELLITUS WITHOUT COMPLICATION, WITHOUT LONG-TERM CURRENT USE OF INSULIN (HCC): ICD-10-CM

## 2019-08-08 NOTE — TELEPHONE ENCOUNTER
Was the patient seen in the last year in this department? Yes    Does patient have an active prescription for medications requested? Yes    Received Request Via: Pharmacy     Requested Prescriptions     Pending Prescriptions Disp Refills   • Empagliflozin 25 MG Tab 30 Tab 5     Sig: Take 25 mg by mouth every day.

## 2019-08-22 ENCOUNTER — TELEMEDICINE2 (OUTPATIENT)
Dept: MEDICAL GROUP | Age: 74
End: 2019-08-22
Payer: MEDICARE

## 2019-08-22 ENCOUNTER — NON-PROVIDER VISIT (OUTPATIENT)
Dept: MEDICAL GROUP | Facility: CLINIC | Age: 74
End: 2019-08-22
Payer: MEDICARE

## 2019-08-22 VITALS
BODY MASS INDEX: 30.05 KG/M2 | HEART RATE: 70 BPM | WEIGHT: 187 LBS | TEMPERATURE: 98.2 F | RESPIRATION RATE: 16 BRPM | DIASTOLIC BLOOD PRESSURE: 72 MMHG | HEIGHT: 66 IN | SYSTOLIC BLOOD PRESSURE: 141 MMHG | OXYGEN SATURATION: 90 %

## 2019-08-22 DIAGNOSIS — B02.29 POST HERPETIC NEURALGIA: ICD-10-CM

## 2019-08-22 DIAGNOSIS — E78.5 DYSLIPIDEMIA: ICD-10-CM

## 2019-08-22 DIAGNOSIS — E55.9 VITAMIN D DEFICIENCY: ICD-10-CM

## 2019-08-22 DIAGNOSIS — R73.9 HYPERGLYCEMIA: ICD-10-CM

## 2019-08-22 DIAGNOSIS — R53.83 OTHER FATIGUE: ICD-10-CM

## 2019-08-22 DIAGNOSIS — I25.10 CORONARY ARTERY DISEASE INVOLVING NATIVE CORONARY ARTERY OF NATIVE HEART WITHOUT ANGINA PECTORIS: ICD-10-CM

## 2019-08-22 DIAGNOSIS — E11.9 CONTROLLED TYPE 2 DIABETES MELLITUS WITHOUT COMPLICATION, WITHOUT LONG-TERM CURRENT USE OF INSULIN (HCC): ICD-10-CM

## 2019-08-22 LAB
HBA1C MFR BLD: 7.7 % (ref 0–5.6)
INT CON NEG: NEGATIVE
INT CON POS: POSITIVE

## 2019-08-22 PROCEDURE — 99214 OFFICE O/P EST MOD 30 MIN: CPT | Performed by: INTERNAL MEDICINE

## 2019-08-22 PROCEDURE — 83036 HEMOGLOBIN GLYCOSYLATED A1C: CPT | Performed by: INTERNAL MEDICINE

## 2019-08-22 NOTE — PROGRESS NOTES
CC: 6-month follow-up visit    Verified identification with patient. Secured video conference with RN presenter in [facility]      HPI:   Kimberly presents today with the following.    1. Controlled type 2 diabetes mellitus without complication, without long-term current use of insulin (HCC)  Patient is taking maximum doses of Invokana, metformin, Actos, glipizide.  Patient declines insulin.  Hemoglobin A1c 7.7, up from 6.4.  Blood sugars run between 109 and 125.  She tries to follow a diabetic diet however she quit cigarette smoking about 6 to 8 months ago and occasionally has cravings.    2. Dyslipidemia  Lipid panel due.  LDL goal less than 70.  Taking Zocor 20 mg daily.    3. Coronary artery disease involving native coronary artery of native heart without angina pectoris  Patient was last seen by cardiology 6 months ago.  Patient is due for her lipid panel and also for cardiac stress test.  Patient has difficulty with transportation to [place] to get her stress test completed at patient denies chest pain, shortness of breath palpitations, lower extremity edema, dizziness patient is taking her medications which include Plavix, Zocor, Lotensin and Lopressor.    4. Post herpetic neuralgia  Patient has residual postherpetic neuralgia of her left ear which occasionally flares.  Patient decided to stop taking gabapentin as it did not offer much relief.          Patient Active Problem List    Diagnosis Date Noted   • Post herpetic neuralgia 08/22/2019   • Seborrheic keratosis 07/31/2019   • Pap smear for cervical cancer screening 07/31/2019   • Hypomagnesemia 06/03/2019   • Herpes zoster with complication 06/03/2019   • Left non-suppurative otitis media 06/03/2019   • Cellulitis and abscess of hand 12/12/2018   • Arthritis/arthropathy of multiple joints 12/12/2018   • Stented coronary artery 10/10/2018   • Lipoma of torso 07/12/2018   • Abnormal mammogram 03/22/2018   • Type 2 diabetes mellitus with ophthalmic  complication ([facility]) 03/22/2018   • Exposure 09/20/2017   • BRBPR (bright red blood per rectum) 06/01/2017   • H/O colonoscopy with polypectomy 06/01/2017   • Essential hypertension 04/27/2017   • Dyslipidemia 04/27/2017   • Controlled type 2 diabetes mellitus without complication ([facility]) 04/27/2017   • Coronary artery disease involving native coronary artery of native heart without angina pectoris 04/27/2017   • Reactive depression 04/27/2017   • PAD (peripheral artery disease) ([facility]) 04/27/2017   • Hypoxemia 04/27/2017   • Tobacco abuse 04/27/2017       Current Outpatient Medications   Medication Sig Dispense Refill   • Empagliflozin 25 MG Tab Take 25 mg by mouth every day. 30 Tab 5   • simvastatin (ZOCOR) 20 MG Tab Take 1 Tab by mouth every evening. 30 Tab 5   • glipiZIDE (GLUCOTROL) 10 MG Tab Take 1 Tab by mouth 2 times a day. 180 Tab 2   • clopidogrel (PLAVIX) 75 MG Tab Take 1 Tab by mouth every day. 30 Tab 5   • FLUoxetine (PROZAC) 20 MG Cap Take 1 Cap by mouth every day. 30 Cap 3   • meloxicam (MOBIC) 7.5 MG Tab Take 1 Tab by mouth every day. 30 Tab 5   • metoprolol (LOPRESSOR) 25 MG Tab Take 1 Tab by mouth 2 times a day. 60 Tab 3   • metformin (GLUCOPHAGE) 1000 MG tablet Take one tablet orally every morning and one and one-half tablet at bedtime 90 Tab 5   • benazepril (LOTENSIN) 40 MG tablet Take 1 Tab by mouth every day. 30 Tab 5   • pioglitazone (ACTOS) 45 MG Tab Take 1 Tab by mouth every day. 30 Tab 5   • nitroglycerin (NITROSTAT) 0.4 MG SL Tab Place 1 Tab under tongue as needed for Chest Pain. 25 Tab 1   • NON SPECIFIED Portable Oxygen  Dx: COPD 1 Each 11   • aspirin (ASA) 81 MG Chew Tab chewable tablet Take 81 mg by mouth every day.       No current facility-administered medications for this visit.          Allergies as of 08/22/2019 - Reviewed 08/22/2019   Allergen Reaction Noted   • Aldactone [kdc:yellow dye+spironolactone] Rash 04/27/2017   • Percocet [apap-fd&c red #40 [place]-oxycodone] Rash 04/27/2017  "       ROS: As per HPI.  Denies all other cardiopulmonary, GI, neurologic symptoms.    /72 (BP Location: Right arm, Patient Position: Sitting, BP Cuff Size: Adult)   Pulse 70   Temp 36.8 °C (98.2 °F) (Temporal)   Resp 16   Ht 1.676 m (5' 6\")   Wt 84.8 kg (187 lb)   SpO2 90%   BMI 30.18 kg/m²     Physical Exam:  Gen:         Alert and oriented, No apparent distress.  Neck:        No Lymphadenopathy or Bruits.  No JVD.  Neck is supple.  No thyromegaly.  No rashes noted  Lungs:     Clear to auscultation bilaterally, no wheezes rhonchi or crackles  CV:          Regular rate and rhythm. No murmurs, rubs or gallops.  S1-S2 heard  Abd:         Soft non tender, non distended. Normal active bowel sounds.  No  Hepatosplenomegaly, No pulsatile masses.                   Ext:          No clubbing, cyanosis, edema.  Neuro:      Grossly intact    Assessment and Plan.   73 y.o. female with the following issues.    1. Controlled type 2 diabetes mellitus without complication, without long-term current use of insulin (HCC)  Patient declines insulin  Continue current plan of care with oral diabetic medications  Patient due for retinal exam on next visit  Labs ordered    - Comp Metabolic Panel; Future  - CBC WITH DIFFERENTIAL; Future  - TSH; Future  - MICROALB/CREAT RATIO, TIMED UR    2. Dyslipidemia    - Lipid Profile; Future    3. Coronary artery disease involving native coronary artery of native heart without angina pectoris  Patient will try to arrange transportation to follow-up with cardiac stress test in [place]  Stable    - Lipid Profile; Future    4. Post herpetic neuralgia  Stable off gabapentin    5. Vitamin D deficiency    - VITAMIN D,25 HYDROXY; Future    6. Other fatigue    - TSH; Future            Please note that this dictation was created using voice recognition software. I have made every reasonable attempt to correct obvious errors, but expect that there are errors of grammar and possible content that I " did not discover before finalizing note.

## 2019-09-24 ENCOUNTER — NON-PROVIDER VISIT (OUTPATIENT)
Dept: MEDICAL GROUP | Facility: CLINIC | Age: 74
End: 2019-09-24
Payer: MEDICARE

## 2019-09-24 DIAGNOSIS — E78.5 DYSLIPIDEMIA: ICD-10-CM

## 2019-09-24 PROCEDURE — 36415 COLL VENOUS BLD VENIPUNCTURE: CPT | Performed by: FAMILY MEDICINE

## 2019-09-27 ENCOUNTER — TELEPHONE (OUTPATIENT)
Dept: MEDICAL GROUP | Facility: CLINIC | Age: 74
End: 2019-09-27

## 2019-10-04 ENCOUNTER — TELEPHONE (OUTPATIENT)
Dept: MEDICAL GROUP | Facility: CLINIC | Age: 74
End: 2019-10-04

## 2019-10-15 DIAGNOSIS — M12.9 ARTHRITIS/ARTHROPATHY OF MULTIPLE JOINTS: ICD-10-CM

## 2019-10-15 DIAGNOSIS — E11.9 CONTROLLED TYPE 2 DIABETES MELLITUS WITHOUT COMPLICATION, WITHOUT LONG-TERM CURRENT USE OF INSULIN (HCC): ICD-10-CM

## 2019-10-15 DIAGNOSIS — I25.10 CORONARY ARTERY DISEASE INVOLVING NATIVE CORONARY ARTERY OF NATIVE HEART WITHOUT ANGINA PECTORIS: ICD-10-CM

## 2019-10-15 DIAGNOSIS — I10 ESSENTIAL HYPERTENSION: ICD-10-CM

## 2019-10-15 DIAGNOSIS — F32.9 REACTIVE DEPRESSION: ICD-10-CM

## 2019-10-16 ENCOUNTER — TELEMEDICINE2 (OUTPATIENT)
Dept: MEDICAL GROUP | Age: 74
End: 2019-10-16
Payer: MEDICARE

## 2019-10-16 ENCOUNTER — NON-PROVIDER VISIT (OUTPATIENT)
Dept: MEDICAL GROUP | Facility: CLINIC | Age: 74
End: 2019-10-16
Payer: MEDICARE

## 2019-10-16 ENCOUNTER — TELEMEDICINE ORIGINATING SITE VISIT (OUTPATIENT)
Dept: MEDICAL GROUP | Facility: CLINIC | Age: 74
End: 2019-10-16
Payer: MEDICARE

## 2019-10-16 VITALS
TEMPERATURE: 99.2 F | SYSTOLIC BLOOD PRESSURE: 166 MMHG | HEART RATE: 62 BPM | BODY MASS INDEX: 29.41 KG/M2 | OXYGEN SATURATION: 88 % | DIASTOLIC BLOOD PRESSURE: 72 MMHG | HEIGHT: 66 IN | WEIGHT: 183 LBS | RESPIRATION RATE: 16 BRPM

## 2019-10-16 DIAGNOSIS — E11.9 CONTROLLED TYPE 2 DIABETES MELLITUS WITHOUT COMPLICATION, WITHOUT LONG-TERM CURRENT USE OF INSULIN (HCC): ICD-10-CM

## 2019-10-16 DIAGNOSIS — Z98.890 HISTORY OF RECENT DENTAL PROCEDURE: ICD-10-CM

## 2019-10-16 DIAGNOSIS — K08.89 PAIN, DENTAL: ICD-10-CM

## 2019-10-16 DIAGNOSIS — D64.9 LOW HEMOGLOBIN: ICD-10-CM

## 2019-10-16 DIAGNOSIS — I25.10 CORONARY ARTERY DISEASE INVOLVING NATIVE CORONARY ARTERY OF NATIVE HEART WITHOUT ANGINA PECTORIS: ICD-10-CM

## 2019-10-16 PROCEDURE — 36415 COLL VENOUS BLD VENIPUNCTURE: CPT | Performed by: INTERNAL MEDICINE

## 2019-10-16 PROCEDURE — 99214 OFFICE O/P EST MOD 30 MIN: CPT | Performed by: INTERNAL MEDICINE

## 2019-10-16 PROCEDURE — 99000 SPECIMEN HANDLING OFFICE-LAB: CPT | Performed by: INTERNAL MEDICINE

## 2019-10-16 RX ORDER — TRAMADOL HYDROCHLORIDE 50 MG/1
50 TABLET ORAL 2 TIMES DAILY PRN
Qty: 30 TAB | Refills: 0 | OUTPATIENT
Start: 2019-10-16 | End: 2019-11-15

## 2019-10-16 RX ORDER — MELOXICAM 7.5 MG/1
7.5 TABLET ORAL DAILY
Qty: 30 TAB | Refills: 5 | Status: SHIPPED | OUTPATIENT
Start: 2019-10-16 | End: 2021-07-15

## 2019-10-16 RX ORDER — FLUOXETINE HYDROCHLORIDE 20 MG/1
20 CAPSULE ORAL DAILY
Qty: 30 CAP | Refills: 3 | Status: SHIPPED | OUTPATIENT
Start: 2019-10-16 | End: 2020-02-13 | Stop reason: SDUPTHER

## 2019-10-16 RX ORDER — GABAPENTIN 100 MG/1
CAPSULE ORAL
COMMUNITY
Start: 2019-08-01 | End: 2021-07-15

## 2019-10-16 RX ORDER — BENAZEPRIL HYDROCHLORIDE 40 MG/1
40 TABLET ORAL DAILY
Qty: 30 TAB | Refills: 5 | Status: SHIPPED | OUTPATIENT
Start: 2019-10-16 | End: 2021-07-15

## 2019-10-16 RX ORDER — PIOGLITAZONEHYDROCHLORIDE 45 MG/1
45 TABLET ORAL DAILY
Qty: 30 TAB | Refills: 5 | Status: SHIPPED | OUTPATIENT
Start: 2019-10-16 | End: 2021-07-15

## 2019-10-16 RX ORDER — AMOXICILLIN 500 MG/1
CAPSULE ORAL
COMMUNITY
Start: 2019-09-30 | End: 2021-07-15

## 2019-10-16 RX ORDER — PIOGLITAZONEHYDROCHLORIDE 45 MG/1
45 TABLET ORAL DAILY
Qty: 30 TAB | Refills: 5 | Status: SHIPPED | OUTPATIENT
Start: 2019-10-16 | End: 2019-10-16 | Stop reason: SDUPTHER

## 2019-10-16 NOTE — TELEPHONE ENCOUNTER
Was the patient seen in the last year in this department? Yes    Does patient have an active prescription for medications requested? Yes    Received Request Via: Patient       Requested Prescriptions     Pending Prescriptions Disp Refills   • benazepril (LOTENSIN) 40 MG tablet 30 Tab 5     Sig: Take 1 Tab by mouth every day.   • meloxicam (MOBIC) 7.5 MG Tab 30 Tab 5     Sig: Take 1 Tab by mouth every day.   • pioglitazone (ACTOS) 45 MG Tab 30 Tab 5     Sig: Take 1 Tab by mouth every day.   • FLUoxetine (PROZAC) 20 MG Cap 30 Cap 3     Sig: Take 1 Cap by mouth every day.   • metoprolol (LOPRESSOR) 25 MG Tab 60 Tab 3     Sig: Take 1 Tab by mouth 2 times a day.     Signed Prescriptions Disp Refills   • pioglitazone (ACTOS) 45 MG Tab 30 Tab 5     Sig: Take 1 Tab by mouth every day.     Authorizing Provider: VICKEY LOJA

## 2019-10-16 NOTE — PROGRESS NOTES
CC: Follow-up lab work     Verified identification with patient. Secured video conference with RN presenter in [facility]      HPI:   Kimberly presents today with the following.    1. Low hemoglobin  Recent hemoglobin level of 9.9.  Hemoglobin level in September 2018 was 16.  Iron studies at that time were normal.  Patient has a remote history of a GI bleed with colon polyps diagnosed by colonoscopy in 2017.  Patient does not know when her follow-up colonoscopy is due.  Patient has been feeling a bit tired over the last 2 weeks.  Patient denies any stool changes to include bright red blood per rectum, melena.    2. History of recent dental procedure  Patient had 8 teeth pulled in her lower jaw 6 days ago.  Patient remained on Plavix as recommended due to CAD s/p stent placement.  Patient did not have excessive bleeding but continues to have a great deal of discomfort.  Keeping her awake at night.  Tylenol 2 tablets not helping for pain control.  Patient is completing dental hygiene as instructed by her dentist.    3. Coronary artery disease involving native coronary artery of native heart without angina pectoris  Patient continues to be medically managed and is stable.  Patient continues on aspirin and Plavix.  Zocor 20 mg daily with a total cholesterol level of 148, triglyceride 101, HDL 7 3, LDL 57.      Patient Active Problem List    Diagnosis Date Noted   • Low hemoglobin 10/16/2019   • History of recent dental procedure 10/16/2019   • Post herpetic neuralgia 08/22/2019   • Seborrheic keratosis 07/31/2019   • Pap smear for cervical cancer screening 07/31/2019   • Hypomagnesemia 06/03/2019   • Herpes zoster with complication 06/03/2019   • Left non-suppurative otitis media 06/03/2019   • Cellulitis and abscess of hand 12/12/2018   • Arthritis/arthropathy of multiple joints 12/12/2018   • Stented coronary artery 10/10/2018   • Lipoma of torso 07/12/2018   • Abnormal mammogram 03/22/2018   • Type 2 diabetes  mellitus with ophthalmic complication ([facility]) 03/22/2018   • Exposure 09/20/2017   • BRBPR (bright red blood per rectum) 06/01/2017   • H/O colonoscopy with polypectomy 06/01/2017   • Essential hypertension 04/27/2017   • Dyslipidemia 04/27/2017   • Controlled type 2 diabetes mellitus without complication ([facility]) 04/27/2017   • Coronary artery disease involving native coronary artery of native heart without angina pectoris 04/27/2017   • Reactive depression 04/27/2017   • PAD (peripheral artery disease) ([facility]) 04/27/2017   • Hypoxemia 04/27/2017   • Tobacco abuse 04/27/2017       Current Outpatient Medications   Medication Sig Dispense Refill   • pioglitazone (ACTOS) 45 MG Tab Take 1 Tab by mouth every day. 30 Tab 5   • amoxicillin (AMOXIL) 500 MG Cap      • gabapentin (NEURONTIN) 100 MG Cap      • Empagliflozin 25 MG Tab Take 25 mg by mouth every day. 30 Tab 5   • simvastatin (ZOCOR) 20 MG Tab Take 1 Tab by mouth every evening. 30 Tab 5   • glipiZIDE (GLUCOTROL) 10 MG Tab Take 1 Tab by mouth 2 times a day. 180 Tab 2   • clopidogrel (PLAVIX) 75 MG Tab Take 1 Tab by mouth every day. 30 Tab 5   • FLUoxetine (PROZAC) 20 MG Cap Take 1 Cap by mouth every day. 30 Cap 3   • meloxicam (MOBIC) 7.5 MG Tab Take 1 Tab by mouth every day. 30 Tab 5   • metoprolol (LOPRESSOR) 25 MG Tab Take 1 Tab by mouth 2 times a day. 60 Tab 3   • metformin (GLUCOPHAGE) 1000 MG tablet Take one tablet orally every morning and one and one-half tablet at bedtime 90 Tab 5   • benazepril (LOTENSIN) 40 MG tablet Take 1 Tab by mouth every day. 30 Tab 5   • nitroglycerin (NITROSTAT) 0.4 MG SL Tab Place 1 Tab under tongue as needed for Chest Pain. 25 Tab 1   • NON SPECIFIED Portable Oxygen  Dx: COPD 1 Each 11   • aspirin (ASA) 81 MG Chew Tab chewable tablet Take 81 mg by mouth every day.       No current facility-administered medications for this visit.          Allergies as of 10/16/2019 - Reviewed 10/16/2019   Allergen Reaction Noted   • Aldactone  "[kdc:yellow dye+spironolactone] Rash 04/27/2017   • Percocet [apap-fd&c red #40 [place]-oxycodone] Rash 04/27/2017        ROS: As per HPI.  Denies all other cardiopulmonary, GI, neurologic symptoms.    BP (!) 166/72 (BP Location: Right arm, Patient Position: Sitting)   Pulse 62   Temp 37.3 °C (99.2 °F)   Resp 16   Ht 1.676 m (5' 6\")   Wt 83 kg (183 lb)   SpO2 88%   BMI 29.54 kg/m²     Physical Exam:  Gen:         Alert and oriented, No apparent distress.  Neck:        No Lymphadenopathy or Bruits.  Supple.  No thyromegaly  Lungs:     Clear to auscultation bilaterally, no wheezes rhonchi or crackles  CV:          Regular rate and rhythm. No murmurs, rubs or gallops.  S1-S2 heard  Abd:         Soft non tender, non distended. Normal active bowel sounds.  No  Hepatosplenomegaly, No pulsatile masses.                   Ext:          No clubbing, cyanosis, edema.      Assessment and Plan.   73 y.o. female with the following issues.    1. Low hemoglobin  Recheck lab work    - CBC WITH DIFFERENTIAL; Future  - VITAMIN B12; Future  - IRON/TOTAL IRON BIND; Future  - TRANSFERRIN; Future  - FERRITIN; Future  - FOLATE; Future  - COLOGUARD (FIT DNA)    2. History of recent dental procedure  Prescription for Ultram to use as needed    3. Coronary artery disease involving native coronary artery of native heart without angina pectoris  Stable, continue current medication management            Please note that this dictation was created using voice recognition software. I have made every reasonable attempt to correct obvious errors, but expect that there are errors of grammar and possible content that I did not discover before finalizing note.   "

## 2019-12-03 ENCOUNTER — OFFICE VISIT (OUTPATIENT)
Dept: MEDICAL GROUP | Facility: CLINIC | Age: 74
End: 2019-12-03
Payer: MEDICARE

## 2019-12-03 VITALS
HEART RATE: 68 BPM | RESPIRATION RATE: 16 BRPM | OXYGEN SATURATION: 90 % | BODY MASS INDEX: 29.89 KG/M2 | DIASTOLIC BLOOD PRESSURE: 70 MMHG | WEIGHT: 186 LBS | TEMPERATURE: 100.2 F | SYSTOLIC BLOOD PRESSURE: 116 MMHG | HEIGHT: 66 IN

## 2019-12-03 DIAGNOSIS — R19.7 DIARRHEA OF PRESUMED INFECTIOUS ORIGIN: ICD-10-CM

## 2019-12-03 DIAGNOSIS — N30.01 ACUTE CYSTITIS WITH HEMATURIA: ICD-10-CM

## 2019-12-03 DIAGNOSIS — D50.8 IRON DEFICIENCY ANEMIA SECONDARY TO INADEQUATE DIETARY IRON INTAKE: ICD-10-CM

## 2019-12-03 DIAGNOSIS — R30.0 DYSURIA: ICD-10-CM

## 2019-12-03 LAB
APPEARANCE UR: NORMAL
BILIRUB UR STRIP-MCNC: NEGATIVE MG/DL
COLOR UR AUTO: YELLOW
GLUCOSE UR STRIP.AUTO-MCNC: 500 MG/DL
KETONES UR STRIP.AUTO-MCNC: NEGATIVE MG/DL
LEUKOCYTE ESTERASE UR QL STRIP.AUTO: NORMAL
NITRITE UR QL STRIP.AUTO: NEGATIVE
PH UR STRIP.AUTO: 7 [PH] (ref 5–8)
PROT UR QL STRIP: NEGATIVE MG/DL
RBC UR QL AUTO: NORMAL
SP GR UR STRIP.AUTO: 1.01
UROBILINOGEN UR STRIP-MCNC: 0.2 MG/DL

## 2019-12-03 PROCEDURE — 99214 OFFICE O/P EST MOD 30 MIN: CPT | Performed by: PHYSICIAN ASSISTANT

## 2019-12-03 PROCEDURE — 81002 URINALYSIS NONAUTO W/O SCOPE: CPT | Performed by: PHYSICIAN ASSISTANT

## 2019-12-03 PROCEDURE — 99000 SPECIMEN HANDLING OFFICE-LAB: CPT | Performed by: PHYSICIAN ASSISTANT

## 2019-12-03 RX ORDER — PHENAZOPYRIDINE HYDROCHLORIDE 200 MG/1
200 TABLET, FILM COATED ORAL 3 TIMES DAILY PRN
Qty: 6 TAB | Refills: 0 | Status: SHIPPED | OUTPATIENT
Start: 2019-12-03 | End: 2021-07-15

## 2019-12-03 RX ORDER — NITROFURANTOIN 25; 75 MG/1; MG/1
100 CAPSULE ORAL 2 TIMES DAILY
Qty: 14 CAP | Refills: 0 | Status: SHIPPED | OUTPATIENT
Start: 2019-12-03 | End: 2019-12-10

## 2019-12-03 NOTE — PROGRESS NOTES
cc:  UTI symptoms    Subjective:     Kimberly Castañeda is a 74 y.o. female presenting for UTI symptoms      Patient presents to the office for UTI symptoms.  She has had burning with urination, frequency and is urinating small amounts.  She feels her symptoms started 3 days ago.  She has been drinking lots of water and cinnamon tea.  She cannot tolerate cranberry juice.  She has not noticed any fever or chills.  She denies any other symptoms except for pelvic pain.  It is not severe, she states it feels more gassy.      Patient had 8 bottom teeth removed and has been sore since.  She states she has not been able to chew.  She has also had diarrhea.  She was given amoxicillin.  She has had a hard time stabilizing her sugars as her diet is so limited.  Her last labs also show she was anemic.      Review of systems:  See above. Denies any other symptoms unless previously noted.        Current Outpatient Medications:   •  nitrofurantoin monohyd macro (MACROBID) 100 MG Cap, Take 1 Cap by mouth 2 times a day., Disp: 14 Cap, Rfl: 0  •  phenazopyridine (PYRIDIUM) 200 MG Tab, Take 1 Tab by mouth 3 times a day as needed., Disp: 6 Tab, Rfl: 0  •  amoxicillin (AMOXIL) 500 MG Cap, , Disp: , Rfl:   •  gabapentin (NEURONTIN) 100 MG Cap, , Disp: , Rfl:   •  benazepril (LOTENSIN) 40 MG tablet, Take 1 Tab by mouth every day., Disp: 30 Tab, Rfl: 5  •  meloxicam (MOBIC) 7.5 MG Tab, Take 1 Tab by mouth every day., Disp: 30 Tab, Rfl: 5  •  pioglitazone (ACTOS) 45 MG Tab, Take 1 Tab by mouth every day., Disp: 30 Tab, Rfl: 5  •  FLUoxetine (PROZAC) 20 MG Cap, Take 1 Cap by mouth every day., Disp: 30 Cap, Rfl: 3  •  metoprolol (LOPRESSOR) 25 MG Tab, Take 1 Tab by mouth 2 times a day., Disp: 60 Tab, Rfl: 3  •  Empagliflozin 25 MG Tab, Take 25 mg by mouth every day., Disp: 30 Tab, Rfl: 5  •  simvastatin (ZOCOR) 20 MG Tab, Take 1 Tab by mouth every evening., Disp: 30 Tab, Rfl: 5  •  glipiZIDE (GLUCOTROL) 10 MG Tab, Take 1 Tab by mouth 2 times a  "day., Disp: 180 Tab, Rfl: 2  •  clopidogrel (PLAVIX) 75 MG Tab, Take 1 Tab by mouth every day., Disp: 30 Tab, Rfl: 5  •  metformin (GLUCOPHAGE) 1000 MG tablet, Take one tablet orally every morning and one and one-half tablet at bedtime, Disp: 90 Tab, Rfl: 5  •  nitroglycerin (NITROSTAT) 0.4 MG SL Tab, Place 1 Tab under tongue as needed for Chest Pain., Disp: 25 Tab, Rfl: 1  •  NON SPECIFIED, Portable Oxygen Dx: COPD, Disp: 1 Each, Rfl: 11  •  aspirin (ASA) 81 MG Chew Tab chewable tablet, Take 81 mg by mouth every day., Disp: , Rfl:     Allergies, past medical history, past surgical history, family history, social history reviewed and updated    Objective:     Vitals: /70 (BP Location: Left arm, Patient Position: Sitting)   Pulse 68   Temp 37.9 °C (100.2 °F)   Resp 16   Ht 1.676 m (5' 6\")   Wt 84.4 kg (186 lb)   SpO2 90%   BMI 30.02 kg/m²   General: Alert, pleasant, NAD  EYES:   PERRL, EOMI, no icterus or pallor.  Conjunctivae and lids normal.   HENT:  Normocephalic.  External ears normal.  Neck supple.   Abdomen: obese  Skin: Warm, dry, no rashes.  Musculoskeletal: Gait is normal.  Moves all extremities well.  Neurological: No tremors, sensation grossly intact, CN2-12 intact.  Psych:  Affect/mood is normal, judgement is good, memory is intact, grooming is appropriate.    Assessment/Plan:     Kimberly was seen today for uti.    Diagnoses and all orders for this visit:    Acute cystitis with hematuria  -     nitrofurantoin monohyd macro (MACROBID) 100 MG Cap; Take 1 Cap by mouth 2 times a day.  -     phenazopyridine (PYRIDIUM) 200 MG Tab; Take 1 Tab by mouth 3 times a day as needed.  Dysuria  -     POCT Urinalysis  -     URINE CULTURE(NEW)  -     nitrofurantoin monohyd macro (MACROBID) 100 MG Cap; Take 1 Cap by mouth 2 times a day.  -     phenazopyridine (PYRIDIUM) 200 MG Tab; Take 1 Tab by mouth 3 times a day as needed.    Urine collected and will be sent to the lab for analysis.  Urinalysis in office does " show patient most likely has an infection.  We will start her on nitrofurantoin and provide Pyridium for pain.  ER precautions given.  Will notify patient of results for culture when received.  If no significant improvement, patient to notify this office.    Iron deficiency anemia secondary to inadequate dietary iron intake  -     CBC WITH DIFFERENTIAL; Future  -     Comp Metabolic Panel; Future  -     IRON/TOTAL IRON BIND; Future  -     FERRITIN; Future  Concerned that patient is anemic.  We will evaluate this further.  Labs have been ordered at this time.    Diarrhea of presumed infectious origin  -     C DIFFICILE TOXINS A+B, EIA    Labs have been ordered to evaluate further.    No follow-ups on file.    Please note that this dictation was created using voice recognition software. I have made every reasonable attempt to correct obvious errors, but expect that there are errors of grammar and possible content that I did not discover before finalizing note.

## 2019-12-05 ENCOUNTER — NON-PROVIDER VISIT (OUTPATIENT)
Dept: MEDICAL GROUP | Facility: CLINIC | Age: 74
End: 2019-12-05
Payer: MEDICARE

## 2019-12-05 DIAGNOSIS — R19.7 DIARRHEA OF PRESUMED INFECTIOUS ORIGIN: ICD-10-CM

## 2019-12-05 PROCEDURE — 36415 COLL VENOUS BLD VENIPUNCTURE: CPT | Performed by: FAMILY MEDICINE

## 2019-12-10 ENCOUNTER — TELEPHONE (OUTPATIENT)
Dept: MEDICAL GROUP | Facility: CLINIC | Age: 74
End: 2019-12-10

## 2019-12-10 DIAGNOSIS — D50.8 IRON DEFICIENCY ANEMIA SECONDARY TO INADEQUATE DIETARY IRON INTAKE: ICD-10-CM

## 2019-12-10 DIAGNOSIS — N30.01 ACUTE CYSTITIS WITH HEMATURIA: ICD-10-CM

## 2019-12-10 LAB
ALBUMIN SERPL-MCNC: 4.3 G/DL (ref 3.5–4.8)
ALBUMIN/GLOB SERPL: 1.5 {RATIO} (ref 1.2–2.2)
ALP SERPL-CCNC: 61 IU/L (ref 39–117)
ALT SERPL-CCNC: 14 IU/L (ref 0–32)
AST SERPL-CCNC: 18 IU/L (ref 0–40)
BASOPHILS # BLD AUTO: 0.1 X10E3/UL (ref 0–0.2)
BASOPHILS NFR BLD AUTO: 1 %
BILIRUB SERPL-MCNC: <0.2 MG/DL (ref 0–1.2)
BUN SERPL-MCNC: 33 MG/DL (ref 8–27)
BUN/CREAT SERPL: 34 (ref 12–28)
C DIFF TOX A+B STL QL IA: NEGATIVE
CALCIUM SERPL-MCNC: 9.3 MG/DL (ref 8.7–10.3)
CHLORIDE SERPL-SCNC: 101 MMOL/L (ref 96–106)
CO2 SERPL-SCNC: 22 MMOL/L (ref 20–29)
CREAT SERPL-MCNC: 0.96 MG/DL (ref 0.57–1)
EOSINOPHIL # BLD AUTO: 0.2 X10E3/UL (ref 0–0.4)
EOSINOPHIL NFR BLD AUTO: 2 %
ERYTHROCYTE [DISTWIDTH] IN BLOOD BY AUTOMATED COUNT: 19.4 % (ref 12.3–15.4)
FERRITIN SERPL-MCNC: 12 NG/ML (ref 15–150)
GLOBULIN SER CALC-MCNC: 2.9 G/DL (ref 1.5–4.5)
GLUCOSE SERPL-MCNC: 187 MG/DL (ref 65–99)
HCT VFR BLD AUTO: 35.6 % (ref 34–46.6)
HGB BLD-MCNC: 10.5 G/DL (ref 11.1–15.9)
IMM GRANULOCYTES # BLD AUTO: 0 X10E3/UL (ref 0–0.1)
IMM GRANULOCYTES NFR BLD AUTO: 1 %
IMMATURE CELLS  115398: ABNORMAL
IRON SATN MFR SERPL: 4 % (ref 15–55)
IRON SERPL-MCNC: 20 UG/DL (ref 27–139)
LYMPHOCYTES # BLD AUTO: 1.8 X10E3/UL (ref 0.7–3.1)
LYMPHOCYTES NFR BLD AUTO: 24 %
MCH RBC QN AUTO: 24.4 PG (ref 26.6–33)
MCHC RBC AUTO-ENTMCNC: 29.5 G/DL (ref 31.5–35.7)
MCV RBC AUTO: 83 FL (ref 79–97)
MONOCYTES # BLD AUTO: 0.4 X10E3/UL (ref 0.1–0.9)
MONOCYTES NFR BLD AUTO: 6 %
MORPHOLOGY BLD-IMP: ABNORMAL
NEUTROPHILS # BLD AUTO: 5.2 X10E3/UL (ref 1.4–7)
NEUTROPHILS NFR BLD AUTO: 66 %
NRBC BLD AUTO-RTO: ABNORMAL %
PLATELET # BLD AUTO: 317 X10E3/UL (ref 150–450)
POTASSIUM SERPL-SCNC: 5.1 MMOL/L (ref 3.5–5.2)
PROT SERPL-MCNC: 7.2 G/DL (ref 6–8.5)
RBC # BLD AUTO: 4.3 X10E6/UL (ref 3.77–5.28)
SODIUM SERPL-SCNC: 138 MMOL/L (ref 134–144)
TIBC SERPL-MCNC: 461 UG/DL (ref 250–450)
UIBC SERPL-MCNC: 441 UG/DL (ref 118–369)
WBC # BLD AUTO: 7.8 X10E3/UL (ref 3.4–10.8)

## 2019-12-10 RX ORDER — AMOXICILLIN AND CLAVULANATE POTASSIUM 875; 125 MG/1; MG/1
1 TABLET, FILM COATED ORAL 2 TIMES DAILY
Qty: 14 TAB | Refills: 0 | Status: SHIPPED | OUTPATIENT
Start: 2019-12-10 | End: 2021-07-15

## 2019-12-10 RX ORDER — LANOLIN ALCOHOL/MO/W.PET/CERES
325 CREAM (GRAM) TOPICAL
Qty: 90 TAB | Refills: 5 | Status: SHIPPED | OUTPATIENT
Start: 2019-12-10 | End: 2022-10-21

## 2019-12-10 NOTE — TELEPHONE ENCOUNTER
Nitrofurantoin is resistant to treating patient's infection.  I have changed abx to Augmentin and would like patient to start ASAP

## 2019-12-10 NOTE — TELEPHONE ENCOUNTER
Patient has critically low iron levels.  I recommend she go to the ER at this time for iron infusion.

## 2019-12-11 ENCOUNTER — TELEPHONE (OUTPATIENT)
Dept: MEDICAL GROUP | Facility: CLINIC | Age: 74
End: 2019-12-11

## 2019-12-11 NOTE — PROGRESS NOTES
I called and spoke with patient about critical iron levels.  Patient states that she is feeling fine, may be a little rundown.  She states that she has not had a history of critical ferritin levels in the past.  She did have a colonoscopy approximately 1 to 2 years ago and had polyps removed.  She denies any rectal bleeding.  We discussed going to the ER for iron transfusions.  She is declining to do this at this time.  We will therefore submit a referral to hematology for possible iron infusions as well as gastroenterology for possible repeat colonoscopy.  She knows that if her symptoms worsen, she must go to the ER.  As she is not wanting to go to the ER, we will have her begin iron supplementation 3 times a day.  Side effects discussed.    I have also informed her that nitrofurantoin given to her for her UTI is resistant and I have sent in an Rx for Augmentin.  She denies any type of penicillin allergy and will have this filled.

## 2019-12-12 NOTE — TELEPHONE ENCOUNTER
Call to make sure GI appointment was appropriate and in an appropriate timeframe.  Advised patient that based on the holidays, I am not sure were going to get 1 much sooner and she agreed.  Also answered further questions regarding stool softener.  Patient verbalized understanding to all information and had no other questions.

## 2019-12-12 NOTE — TELEPHONE ENCOUNTER
Called patient.  Son picked up.  Asked if it is okay to use a stool softener.  Verified that it is okay to do this.  Let him know I am going home for the evening and will try to call again tomorrow.

## 2020-01-02 DIAGNOSIS — I25.10 CORONARY ARTERY DISEASE INVOLVING NATIVE CORONARY ARTERY OF NATIVE HEART WITHOUT ANGINA PECTORIS: ICD-10-CM

## 2020-01-02 RX ORDER — CLOPIDOGREL BISULFATE 75 MG/1
75 TABLET ORAL DAILY
Qty: 30 TAB | Refills: 5 | Status: ON HOLD | OUTPATIENT
Start: 2020-01-02 | End: 2021-07-18

## 2020-02-10 DIAGNOSIS — E11.9 CONTROLLED TYPE 2 DIABETES MELLITUS WITHOUT COMPLICATION, WITHOUT LONG-TERM CURRENT USE OF INSULIN (HCC): ICD-10-CM

## 2020-02-10 NOTE — TELEPHONE ENCOUNTER
Received request via: Pharmacy    Was the patient seen in the last year in this department? Yes    Does the patient have an active prescription (recently filled or refills available) for medication(s) requested? No  Requested Prescriptions     Pending Prescriptions Disp Refills   • Empagliflozin 25 MG Tab 30 Tab 5     Sig: Take 25 mg by mouth every day.

## 2020-02-13 DIAGNOSIS — E11.21 CONTROLLED TYPE 2 DIABETES MELLITUS WITH DIABETIC NEPHROPATHY, WITHOUT LONG-TERM CURRENT USE OF INSULIN (HCC): ICD-10-CM

## 2020-02-13 DIAGNOSIS — I10 ESSENTIAL HYPERTENSION: ICD-10-CM

## 2020-02-13 DIAGNOSIS — E78.5 DYSLIPIDEMIA: ICD-10-CM

## 2020-02-13 DIAGNOSIS — I25.10 CORONARY ARTERY DISEASE INVOLVING NATIVE CORONARY ARTERY OF NATIVE HEART WITHOUT ANGINA PECTORIS: ICD-10-CM

## 2020-02-13 DIAGNOSIS — E11.9 CONTROLLED TYPE 2 DIABETES MELLITUS WITHOUT COMPLICATION, WITHOUT LONG-TERM CURRENT USE OF INSULIN (HCC): ICD-10-CM

## 2020-02-13 DIAGNOSIS — F32.9 REACTIVE DEPRESSION: ICD-10-CM

## 2020-02-13 RX ORDER — SIMVASTATIN 20 MG
20 TABLET ORAL EVERY EVENING
Qty: 30 TAB | Refills: 8 | Status: SHIPPED | OUTPATIENT
Start: 2020-02-13

## 2020-02-13 RX ORDER — FLUOXETINE HYDROCHLORIDE 20 MG/1
20 CAPSULE ORAL DAILY
Qty: 30 CAP | Refills: 8 | Status: SHIPPED | OUTPATIENT
Start: 2020-02-13

## 2020-02-13 RX ORDER — GLIPIZIDE 10 MG/1
10 TABLET ORAL 2 TIMES DAILY
Qty: 180 TAB | Refills: 2 | Status: SHIPPED | OUTPATIENT
Start: 2020-02-13 | End: 2020-04-09

## 2020-02-13 NOTE — TELEPHONE ENCOUNTER
Received request via: Pharmacy    Was the patient seen in the last year in this department? Yes    Does the patient have an active prescription (recently filled or refills available) for medication(s) requested? No       Requested Prescriptions     Pending Prescriptions Disp Refills   • Empagliflozin 25 MG Tab 90 Tab 3     Sig: Take 25 mg by mouth every day for 90 days.

## 2020-02-13 NOTE — TELEPHONE ENCOUNTER
Received request via: Patient       Was the patient seen in the last year in this department? Yes    Does the patient have an active prescription (recently filled or refills available) for medication(s) requested? No       Requested Prescriptions     Pending Prescriptions Disp Refills   • simvastatin (ZOCOR) 20 MG Tab 30 Tab 5     Sig: Take 1 Tab by mouth every evening.   • glipiZIDE (GLUCOTROL) 10 MG Tab 180 Tab 2     Sig: Take 1 Tab by mouth 2 times a day.   • metoprolol (LOPRESSOR) 25 MG Tab 60 Tab 3     Sig: Take 1 Tab by mouth 2 times a day.   • FLUoxetine (PROZAC) 20 MG Cap 30 Cap 3     Sig: Take 1 Cap by mouth every day.   • metformin (GLUCOPHAGE) 1000 MG tablet 90 Tab 5     Sig: Take one tablet orally every morning and one and one-half tablet at bedtime

## 2020-04-29 ENCOUNTER — TELEPHONE (OUTPATIENT)
Dept: MEDICAL GROUP | Facility: PHYSICIAN GROUP | Age: 75
End: 2020-04-29

## 2020-07-29 ENCOUNTER — TELEPHONE (OUTPATIENT)
Dept: MEDICAL GROUP | Facility: PHYSICIAN GROUP | Age: 75
End: 2020-07-29

## 2020-07-29 NOTE — TELEPHONE ENCOUNTER
Please schedule patient for cardiac clearance for procedure. GI requesting cardiac clearance for colonoscopy.

## 2020-07-29 NOTE — LETTER
July 31, 2020            Kimberly Castañeda  [address]  [place] [address]            7/31/2020        Dear Kimberly,      After several attempts, Amaya Ibarra M.D.'s office has been unable to reach you by phone regarding your for cardiac clearance     We ask that you contact us at [phone] at your earliest convenience. Our office hours are from 8:00a - 5:00p Monday thru Friday.    Kind regards,   Lilia Moon, Med Ass't

## 2021-07-15 ENCOUNTER — APPOINTMENT (OUTPATIENT)
Dept: RADIOLOGY | Facility: MEDICAL CENTER | Age: 76
DRG: 378 | End: 2021-07-15
Attending: EMERGENCY MEDICINE
Payer: MEDICARE

## 2021-07-15 ENCOUNTER — HOSPITAL ENCOUNTER (INPATIENT)
Facility: MEDICAL CENTER | Age: 76
LOS: 3 days | DRG: 378 | End: 2021-07-18
Attending: EMERGENCY MEDICINE | Admitting: INTERNAL MEDICINE
Payer: MEDICARE

## 2021-07-15 DIAGNOSIS — K92.2 GASTROINTESTINAL HEMORRHAGE, UNSPECIFIED GASTROINTESTINAL HEMORRHAGE TYPE: ICD-10-CM

## 2021-07-15 DIAGNOSIS — D64.9 ANEMIA, UNSPECIFIED TYPE: ICD-10-CM

## 2021-07-15 LAB
ABO + RH BLD: NORMAL
ABO GROUP BLD: NORMAL
ALBUMIN SERPL BCP-MCNC: 3.5 G/DL (ref 3.2–4.9)
ALBUMIN SERPL BCP-MCNC: 3.8 G/DL (ref 3.2–4.9)
ALBUMIN/GLOB SERPL: 1.8 G/DL
ALBUMIN/GLOB SERPL: 1.8 G/DL
ALP SERPL-CCNC: 43 U/L (ref 30–99)
ALP SERPL-CCNC: 49 U/L (ref 30–99)
ALT SERPL-CCNC: 14 U/L (ref 2–50)
ALT SERPL-CCNC: 15 U/L (ref 2–50)
ANION GAP SERPL CALC-SCNC: 10 MMOL/L (ref 7–16)
ANION GAP SERPL CALC-SCNC: 8 MMOL/L (ref 7–16)
ANISOCYTOSIS BLD QL SMEAR: ABNORMAL
APTT PPP: 24.9 SEC (ref 24.7–36)
AST SERPL-CCNC: 15 U/L (ref 12–45)
AST SERPL-CCNC: 17 U/L (ref 12–45)
BARCODED ABORH UBTYP: 9500
BARCODED PRD CODE UBPRD: NORMAL
BARCODED UNIT NUM UBUNT: NORMAL
BASO STIPL BLD QL SMEAR: NORMAL
BASOPHILS # BLD AUTO: 0.5 % (ref 0–1.8)
BASOPHILS # BLD AUTO: 0.5 % (ref 0–1.8)
BASOPHILS # BLD: 0.04 K/UL (ref 0–0.12)
BASOPHILS # BLD: 0.05 K/UL (ref 0–0.12)
BILIRUB SERPL-MCNC: 0.2 MG/DL (ref 0.1–1.5)
BILIRUB SERPL-MCNC: 0.2 MG/DL (ref 0.1–1.5)
BLD GP AB SCN SERPL QL: NORMAL
BUN SERPL-MCNC: 35 MG/DL (ref 8–22)
BUN SERPL-MCNC: 39 MG/DL (ref 8–22)
CALCIUM SERPL-MCNC: 8 MG/DL (ref 8.5–10.5)
CALCIUM SERPL-MCNC: 8.3 MG/DL (ref 8.5–10.5)
CHLORIDE SERPL-SCNC: 102 MMOL/L (ref 96–112)
CHLORIDE SERPL-SCNC: 103 MMOL/L (ref 96–112)
CO2 SERPL-SCNC: 27 MMOL/L (ref 20–33)
CO2 SERPL-SCNC: 28 MMOL/L (ref 20–33)
COMMENT 1642: NORMAL
COMPONENT R 8504R: NORMAL
CREAT SERPL-MCNC: 0.63 MG/DL (ref 0.5–1.4)
CREAT SERPL-MCNC: 0.72 MG/DL (ref 0.5–1.4)
EKG IMPRESSION: NORMAL
EOSINOPHIL # BLD AUTO: 0.02 K/UL (ref 0–0.51)
EOSINOPHIL # BLD AUTO: 0.03 K/UL (ref 0–0.51)
EOSINOPHIL NFR BLD: 0.2 % (ref 0–6.9)
EOSINOPHIL NFR BLD: 0.3 % (ref 0–6.9)
ERYTHROCYTE [DISTWIDTH] IN BLOOD BY AUTOMATED COUNT: 64.8 FL (ref 35.9–50)
ERYTHROCYTE [DISTWIDTH] IN BLOOD BY AUTOMATED COUNT: 65.3 FL (ref 35.9–50)
EST. AVERAGE GLUCOSE BLD GHB EST-MCNC: 160 MG/DL
GLOBULIN SER CALC-MCNC: 1.9 G/DL (ref 1.9–3.5)
GLOBULIN SER CALC-MCNC: 2.1 G/DL (ref 1.9–3.5)
GLUCOSE SERPL-MCNC: 189 MG/DL (ref 65–99)
GLUCOSE SERPL-MCNC: 246 MG/DL (ref 65–99)
HBA1C MFR BLD: 7.2 % (ref 4–5.6)
HCG SERPL QL: NEGATIVE
HCT VFR BLD AUTO: 21.3 % (ref 37–47)
HCT VFR BLD AUTO: 23.3 % (ref 37–47)
HGB BLD-MCNC: 6.5 G/DL (ref 12–16)
HGB BLD-MCNC: 7 G/DL (ref 12–16)
IMM GRANULOCYTES # BLD AUTO: 0.12 K/UL (ref 0–0.11)
IMM GRANULOCYTES # BLD AUTO: 0.19 K/UL (ref 0–0.11)
IMM GRANULOCYTES NFR BLD AUTO: 1.5 % (ref 0–0.9)
IMM GRANULOCYTES NFR BLD AUTO: 1.9 % (ref 0–0.9)
INR PPP: 1.05 (ref 0.87–1.13)
LIPASE SERPL-CCNC: 44 U/L (ref 11–82)
LYMPHOCYTES # BLD AUTO: 1.96 K/UL (ref 1–4.8)
LYMPHOCYTES # BLD AUTO: 2.1 K/UL (ref 1–4.8)
LYMPHOCYTES NFR BLD: 20.6 % (ref 22–41)
LYMPHOCYTES NFR BLD: 23.8 % (ref 22–41)
MACROCYTES BLD QL SMEAR: ABNORMAL
MCH RBC QN AUTO: 31.3 PG (ref 27–33)
MCH RBC QN AUTO: 31.9 PG (ref 27–33)
MCHC RBC AUTO-ENTMCNC: 30 G/DL (ref 33.6–35)
MCHC RBC AUTO-ENTMCNC: 30.5 G/DL (ref 33.6–35)
MCV RBC AUTO: 104 FL (ref 81.4–97.8)
MCV RBC AUTO: 104.4 FL (ref 81.4–97.8)
MONOCYTES # BLD AUTO: 0.63 K/UL (ref 0–0.85)
MONOCYTES # BLD AUTO: 0.63 K/UL (ref 0–0.85)
MONOCYTES NFR BLD AUTO: 6.2 % (ref 0–13.4)
MONOCYTES NFR BLD AUTO: 7.6 % (ref 0–13.4)
MORPHOLOGY BLD-IMP: NORMAL
NEUTROPHILS # BLD AUTO: 5.48 K/UL (ref 2–7.15)
NEUTROPHILS # BLD AUTO: 7.19 K/UL (ref 2–7.15)
NEUTROPHILS NFR BLD: 66.4 % (ref 44–72)
NEUTROPHILS NFR BLD: 70.5 % (ref 44–72)
NRBC # BLD AUTO: 0.05 K/UL
NRBC # BLD AUTO: 0.11 K/UL
NRBC BLD-RTO: 0.6 /100 WBC
NRBC BLD-RTO: 1.1 /100 WBC
NT-PROBNP SERPL IA-MCNC: 1287 PG/ML (ref 0–125)
PLATELET # BLD AUTO: 225 K/UL (ref 164–446)
PLATELET # BLD AUTO: 286 K/UL (ref 164–446)
PLATELET BLD QL SMEAR: NORMAL
PMV BLD AUTO: 10.1 FL (ref 9–12.9)
PMV BLD AUTO: 9.8 FL (ref 9–12.9)
POLYCHROMASIA BLD QL SMEAR: NORMAL
POTASSIUM SERPL-SCNC: 4.8 MMOL/L (ref 3.6–5.5)
POTASSIUM SERPL-SCNC: 5.1 MMOL/L (ref 3.6–5.5)
PRODUCT TYPE UPROD: NORMAL
PROT SERPL-MCNC: 5.4 G/DL (ref 6–8.2)
PROT SERPL-MCNC: 5.9 G/DL (ref 6–8.2)
PROTHROMBIN TIME: 13.4 SEC (ref 12–14.6)
RBC # BLD AUTO: 2.04 M/UL (ref 4.2–5.4)
RBC # BLD AUTO: 2.24 M/UL (ref 4.2–5.4)
RBC BLD AUTO: PRESENT
RH BLD: NORMAL
SODIUM SERPL-SCNC: 139 MMOL/L (ref 135–145)
SODIUM SERPL-SCNC: 139 MMOL/L (ref 135–145)
TROPONIN T SERPL-MCNC: 11 NG/L (ref 6–19)
UNIT STATUS USTAT: NORMAL
WBC # BLD AUTO: 10.2 K/UL (ref 4.8–10.8)
WBC # BLD AUTO: 8.3 K/UL (ref 4.8–10.8)

## 2021-07-15 PROCEDURE — 71045 X-RAY EXAM CHEST 1 VIEW: CPT

## 2021-07-15 PROCEDURE — 93005 ELECTROCARDIOGRAM TRACING: CPT | Performed by: EMERGENCY MEDICINE

## 2021-07-15 PROCEDURE — 84484 ASSAY OF TROPONIN QUANT: CPT

## 2021-07-15 PROCEDURE — 30233N1 TRANSFUSION OF NONAUTOLOGOUS RED BLOOD CELLS INTO PERIPHERAL VEIN, PERCUTANEOUS APPROACH: ICD-10-PCS | Performed by: INTERNAL MEDICINE

## 2021-07-15 PROCEDURE — 83880 ASSAY OF NATRIURETIC PEPTIDE: CPT

## 2021-07-15 PROCEDURE — 700111 HCHG RX REV CODE 636 W/ 250 OVERRIDE (IP): Performed by: INTERNAL MEDICINE

## 2021-07-15 PROCEDURE — 99285 EMERGENCY DEPT VISIT HI MDM: CPT

## 2021-07-15 PROCEDURE — 86900 BLOOD TYPING SEROLOGIC ABO: CPT

## 2021-07-15 PROCEDURE — A9270 NON-COVERED ITEM OR SERVICE: HCPCS | Performed by: INTERNAL MEDICINE

## 2021-07-15 PROCEDURE — 83036 HEMOGLOBIN GLYCOSYLATED A1C: CPT

## 2021-07-15 PROCEDURE — 770020 HCHG ROOM/CARE - TELE (206)

## 2021-07-15 PROCEDURE — P9016 RBC LEUKOCYTES REDUCED: HCPCS

## 2021-07-15 PROCEDURE — 83690 ASSAY OF LIPASE: CPT

## 2021-07-15 PROCEDURE — C9113 INJ PANTOPRAZOLE SODIUM, VIA: HCPCS | Performed by: INTERNAL MEDICINE

## 2021-07-15 PROCEDURE — 86850 RBC ANTIBODY SCREEN: CPT

## 2021-07-15 PROCEDURE — 82962 GLUCOSE BLOOD TEST: CPT

## 2021-07-15 PROCEDURE — 700117 HCHG RX CONTRAST REV CODE 255

## 2021-07-15 PROCEDURE — 86901 BLOOD TYPING SEROLOGIC RH(D): CPT

## 2021-07-15 PROCEDURE — 74175 CTA ABDOMEN W/CONTRAST: CPT | Mod: ME

## 2021-07-15 PROCEDURE — 85025 COMPLETE CBC W/AUTO DIFF WBC: CPT

## 2021-07-15 PROCEDURE — 700105 HCHG RX REV CODE 258: Performed by: INTERNAL MEDICINE

## 2021-07-15 PROCEDURE — 96365 THER/PROPH/DIAG IV INF INIT: CPT

## 2021-07-15 PROCEDURE — 99221 1ST HOSP IP/OBS SF/LOW 40: CPT | Mod: AI | Performed by: INTERNAL MEDICINE

## 2021-07-15 PROCEDURE — 36430 TRANSFUSION BLD/BLD COMPNT: CPT

## 2021-07-15 PROCEDURE — 700102 HCHG RX REV CODE 250 W/ 637 OVERRIDE(OP): Performed by: INTERNAL MEDICINE

## 2021-07-15 PROCEDURE — 85610 PROTHROMBIN TIME: CPT

## 2021-07-15 PROCEDURE — 80053 COMPREHEN METABOLIC PANEL: CPT

## 2021-07-15 PROCEDURE — 85730 THROMBOPLASTIN TIME PARTIAL: CPT

## 2021-07-15 PROCEDURE — 84703 CHORIONIC GONADOTROPIN ASSAY: CPT

## 2021-07-15 PROCEDURE — 86923 COMPATIBILITY TEST ELECTRIC: CPT

## 2021-07-15 PROCEDURE — 96375 TX/PRO/DX INJ NEW DRUG ADDON: CPT

## 2021-07-15 RX ORDER — MELOXICAM 7.5 MG/1
7.5 TABLET ORAL
COMMUNITY
End: 2022-10-21

## 2021-07-15 RX ORDER — BISACODYL 10 MG
10 SUPPOSITORY, RECTAL RECTAL
Status: DISCONTINUED | OUTPATIENT
Start: 2021-07-15 | End: 2021-07-18 | Stop reason: HOSPADM

## 2021-07-15 RX ORDER — POLYETHYLENE GLYCOL 3350 17 G/17G
1 POWDER, FOR SOLUTION ORAL
Status: DISCONTINUED | OUTPATIENT
Start: 2021-07-15 | End: 2021-07-18 | Stop reason: HOSPADM

## 2021-07-15 RX ORDER — BENAZEPRIL HYDROCHLORIDE 20 MG/1
40 TABLET ORAL DAILY
Status: DISCONTINUED | OUTPATIENT
Start: 2021-07-16 | End: 2021-07-15

## 2021-07-15 RX ORDER — FLUOXETINE HYDROCHLORIDE 20 MG/1
20 CAPSULE ORAL DAILY
Status: DISCONTINUED | OUTPATIENT
Start: 2021-07-16 | End: 2021-07-18 | Stop reason: HOSPADM

## 2021-07-15 RX ORDER — GLIPIZIDE 10 MG/1
10 TABLET ORAL 2 TIMES DAILY
COMMUNITY

## 2021-07-15 RX ORDER — SODIUM CHLORIDE 9 MG/ML
INJECTION, SOLUTION INTRAVENOUS CONTINUOUS
Status: DISCONTINUED | OUTPATIENT
Start: 2021-07-15 | End: 2021-07-18 | Stop reason: HOSPADM

## 2021-07-15 RX ORDER — ACETAMINOPHEN 325 MG/1
650 TABLET ORAL EVERY 6 HOURS PRN
Status: DISCONTINUED | OUTPATIENT
Start: 2021-07-15 | End: 2021-07-18 | Stop reason: HOSPADM

## 2021-07-15 RX ORDER — SIMVASTATIN 20 MG
20 TABLET ORAL EVERY EVENING
Status: DISCONTINUED | OUTPATIENT
Start: 2021-07-15 | End: 2021-07-18 | Stop reason: HOSPADM

## 2021-07-15 RX ORDER — AMOXICILLIN 250 MG
2 CAPSULE ORAL 2 TIMES DAILY
Status: DISCONTINUED | OUTPATIENT
Start: 2021-07-15 | End: 2021-07-18 | Stop reason: HOSPADM

## 2021-07-15 RX ORDER — PIOGLITAZONEHYDROCHLORIDE 45 MG/1
45 TABLET ORAL EVERY MORNING
COMMUNITY

## 2021-07-15 RX ORDER — DEXTROSE MONOHYDRATE 25 G/50ML
50 INJECTION, SOLUTION INTRAVENOUS
Status: DISCONTINUED | OUTPATIENT
Start: 2021-07-15 | End: 2021-07-18 | Stop reason: HOSPADM

## 2021-07-15 RX ADMIN — INSULIN HUMAN 3 UNITS: 100 INJECTION, SOLUTION PARENTERAL at 23:57

## 2021-07-15 RX ADMIN — SODIUM CHLORIDE 8 MG/HR: 9 INJECTION, SOLUTION INTRAVENOUS at 21:13

## 2021-07-15 RX ADMIN — SIMVASTATIN 20 MG: 20 TABLET, FILM COATED ORAL at 21:59

## 2021-07-15 RX ADMIN — IOHEXOL 100 ML: 350 INJECTION, SOLUTION INTRAVENOUS at 19:01

## 2021-07-15 RX ADMIN — SODIUM CHLORIDE 80 MG: 9 INJECTION, SOLUTION INTRAVENOUS at 21:12

## 2021-07-15 RX ADMIN — SODIUM CHLORIDE: 9 INJECTION, SOLUTION INTRAVENOUS at 21:59

## 2021-07-15 RX ADMIN — METOPROLOL TARTRATE 25 MG: 25 TABLET, FILM COATED ORAL at 21:59

## 2021-07-15 ASSESSMENT — ENCOUNTER SYMPTOMS
HEADACHES: 0
COUGH: 1
CLAUDICATION: 0
ABDOMINAL PAIN: 0
MYALGIAS: 0
NAUSEA: 0
BACK PAIN: 0
BLOOD IN STOOL: 1
EYE PAIN: 0
COUGH: 0
VOMITING: 0
DIARRHEA: 0
SHORTNESS OF BREATH: 0
FEVER: 0
DIZZINESS: 1
SHORTNESS OF BREATH: 1
CHILLS: 0
ORTHOPNEA: 0

## 2021-07-15 ASSESSMENT — PATIENT HEALTH QUESTIONNAIRE - PHQ9
2. FEELING DOWN, DEPRESSED, IRRITABLE, OR HOPELESS: NOT AT ALL
SUM OF ALL RESPONSES TO PHQ9 QUESTIONS 1 AND 2: 0
1. LITTLE INTEREST OR PLEASURE IN DOING THINGS: NOT AT ALL

## 2021-07-15 ASSESSMENT — FIBROSIS 4 INDEX
FIB4 SCORE: 1.5
FIB4 SCORE: 1.17

## 2021-07-16 ENCOUNTER — ANESTHESIA (OUTPATIENT)
Dept: SURGERY | Facility: MEDICAL CENTER | Age: 76
DRG: 378 | End: 2021-07-16
Payer: MEDICARE

## 2021-07-16 ENCOUNTER — ANESTHESIA EVENT (OUTPATIENT)
Dept: SURGERY | Facility: MEDICAL CENTER | Age: 76
DRG: 378 | End: 2021-07-16
Payer: MEDICARE

## 2021-07-16 LAB
ALBUMIN SERPL BCP-MCNC: 3.3 G/DL (ref 3.2–4.9)
ALBUMIN/GLOB SERPL: 1.7 G/DL
ALP SERPL-CCNC: 42 U/L (ref 30–99)
ALT SERPL-CCNC: 13 U/L (ref 2–50)
ANION GAP SERPL CALC-SCNC: 5 MMOL/L (ref 7–16)
AST SERPL-CCNC: 10 U/L (ref 12–45)
BASOPHILS # BLD AUTO: 0.4 % (ref 0–1.8)
BASOPHILS # BLD: 0.03 K/UL (ref 0–0.12)
BILIRUB SERPL-MCNC: 0.6 MG/DL (ref 0.1–1.5)
BUN SERPL-MCNC: 29 MG/DL (ref 8–22)
CALCIUM SERPL-MCNC: 8.2 MG/DL (ref 8.5–10.5)
CFT BLD TEG: 3.7 MIN (ref 4.6–9.1)
CFT P HPASE BLD TEG: 4.2 MIN (ref 4.3–8.3)
CHLORIDE SERPL-SCNC: 105 MMOL/L (ref 96–112)
CLOT ANGLE BLD TEG: 77 DEGREES (ref 63–78)
CLOT LYSIS 30M P MA LENFR BLD TEG: ABNORMAL % (ref 0–2.6)
CO2 SERPL-SCNC: 29 MMOL/L (ref 20–33)
CREAT SERPL-MCNC: 0.65 MG/DL (ref 0.5–1.4)
CT.EXTRINSIC BLD ROTEM: 0.8 MIN (ref 0.8–2.1)
EOSINOPHIL # BLD AUTO: 0.03 K/UL (ref 0–0.51)
EOSINOPHIL NFR BLD: 0.4 % (ref 0–6.9)
ERYTHROCYTE [DISTWIDTH] IN BLOOD BY AUTOMATED COUNT: 65 FL (ref 35.9–50)
GLOBULIN SER CALC-MCNC: 1.9 G/DL (ref 1.9–3.5)
GLUCOSE BLD-MCNC: 137 MG/DL (ref 65–99)
GLUCOSE BLD-MCNC: 185 MG/DL (ref 65–99)
GLUCOSE BLD-MCNC: 225 MG/DL (ref 65–99)
GLUCOSE BLD-MCNC: 270 MG/DL (ref 65–99)
GLUCOSE SERPL-MCNC: 174 MG/DL (ref 65–99)
HCT VFR BLD AUTO: 23.9 % (ref 37–47)
HGB BLD-MCNC: 7.4 G/DL (ref 12–16)
HGB BLD-MCNC: 7.5 G/DL (ref 12–16)
HGB BLD-MCNC: 7.9 G/DL (ref 12–16)
IMM GRANULOCYTES # BLD AUTO: 0.09 K/UL (ref 0–0.11)
IMM GRANULOCYTES NFR BLD AUTO: 1.2 % (ref 0–0.9)
LYMPHOCYTES # BLD AUTO: 1.63 K/UL (ref 1–4.8)
LYMPHOCYTES NFR BLD: 22.2 % (ref 22–41)
MCF BLD TEG: 65 MM (ref 52–69)
MCF.PLATELET INHIB BLD ROTEM: 20.7 MM (ref 15–32)
MCH RBC QN AUTO: 30.8 PG (ref 27–33)
MCHC RBC AUTO-ENTMCNC: 31 G/DL (ref 33.6–35)
MCV RBC AUTO: 99.6 FL (ref 81.4–97.8)
MONOCYTES # BLD AUTO: 0.75 K/UL (ref 0–0.85)
MONOCYTES NFR BLD AUTO: 10.2 % (ref 0–13.4)
NEUTROPHILS # BLD AUTO: 4.81 K/UL (ref 2–7.15)
NEUTROPHILS NFR BLD: 65.6 % (ref 44–72)
NRBC # BLD AUTO: 0.04 K/UL
NRBC BLD-RTO: 0.5 /100 WBC
PA AA BLD-ACNC: 37.1 % (ref 0–11)
PA ADP BLD-ACNC: 10.2 % (ref 0–17)
PATHOLOGY CONSULT NOTE: NORMAL
PLATELET # BLD AUTO: 224 K/UL (ref 164–446)
PMV BLD AUTO: 9.9 FL (ref 9–12.9)
POTASSIUM SERPL-SCNC: 4.8 MMOL/L (ref 3.6–5.5)
PROT SERPL-MCNC: 5.2 G/DL (ref 6–8.2)
RBC # BLD AUTO: 2.4 M/UL (ref 4.2–5.4)
SODIUM SERPL-SCNC: 139 MMOL/L (ref 135–145)
TEG ALGORITHM TGALG: ABNORMAL
WBC # BLD AUTO: 7.3 K/UL (ref 4.8–10.8)

## 2021-07-16 PROCEDURE — 88312 SPECIAL STAINS GROUP 1: CPT

## 2021-07-16 PROCEDURE — 82962 GLUCOSE BLOOD TEST: CPT | Mod: 91

## 2021-07-16 PROCEDURE — 700102 HCHG RX REV CODE 250 W/ 637 OVERRIDE(OP): Performed by: STUDENT IN AN ORGANIZED HEALTH CARE EDUCATION/TRAINING PROGRAM

## 2021-07-16 PROCEDURE — 160203 HCHG ENDO MINUTES - 1ST 30 MINS LEVEL 4: Performed by: STUDENT IN AN ORGANIZED HEALTH CARE EDUCATION/TRAINING PROGRAM

## 2021-07-16 PROCEDURE — 80053 COMPREHEN METABOLIC PANEL: CPT

## 2021-07-16 PROCEDURE — A9270 NON-COVERED ITEM OR SERVICE: HCPCS | Performed by: STUDENT IN AN ORGANIZED HEALTH CARE EDUCATION/TRAINING PROGRAM

## 2021-07-16 PROCEDURE — 160035 HCHG PACU - 1ST 60 MINS PHASE I: Performed by: STUDENT IN AN ORGANIZED HEALTH CARE EDUCATION/TRAINING PROGRAM

## 2021-07-16 PROCEDURE — 700101 HCHG RX REV CODE 250: Performed by: ANESTHESIOLOGY

## 2021-07-16 PROCEDURE — 700111 HCHG RX REV CODE 636 W/ 250 OVERRIDE (IP): Performed by: ANESTHESIOLOGY

## 2021-07-16 PROCEDURE — 770020 HCHG ROOM/CARE - TELE (206)

## 2021-07-16 PROCEDURE — 99232 SBSQ HOSP IP/OBS MODERATE 35: CPT | Performed by: HOSPITALIST

## 2021-07-16 PROCEDURE — 85576 BLOOD PLATELET AGGREGATION: CPT | Mod: 91

## 2021-07-16 PROCEDURE — A9270 NON-COVERED ITEM OR SERVICE: HCPCS | Performed by: INTERNAL MEDICINE

## 2021-07-16 PROCEDURE — 85025 COMPLETE CBC W/AUTO DIFF WBC: CPT

## 2021-07-16 PROCEDURE — 700102 HCHG RX REV CODE 250 W/ 637 OVERRIDE(OP): Performed by: INTERNAL MEDICINE

## 2021-07-16 PROCEDURE — 88305 TISSUE EXAM BY PATHOLOGIST: CPT

## 2021-07-16 PROCEDURE — 85347 COAGULATION TIME ACTIVATED: CPT

## 2021-07-16 PROCEDURE — 85384 FIBRINOGEN ACTIVITY: CPT

## 2021-07-16 PROCEDURE — 160009 HCHG ANES TIME/MIN: Performed by: STUDENT IN AN ORGANIZED HEALTH CARE EDUCATION/TRAINING PROGRAM

## 2021-07-16 PROCEDURE — 85018 HEMOGLOBIN: CPT

## 2021-07-16 PROCEDURE — 700105 HCHG RX REV CODE 258: Performed by: ANESTHESIOLOGY

## 2021-07-16 PROCEDURE — 700111 HCHG RX REV CODE 636 W/ 250 OVERRIDE (IP): Performed by: STUDENT IN AN ORGANIZED HEALTH CARE EDUCATION/TRAINING PROGRAM

## 2021-07-16 PROCEDURE — 0DB68ZX EXCISION OF STOMACH, VIA NATURAL OR ARTIFICIAL OPENING ENDOSCOPIC, DIAGNOSTIC: ICD-10-PCS | Performed by: STUDENT IN AN ORGANIZED HEALTH CARE EDUCATION/TRAINING PROGRAM

## 2021-07-16 PROCEDURE — 160002 HCHG RECOVERY MINUTES (STAT): Performed by: STUDENT IN AN ORGANIZED HEALTH CARE EDUCATION/TRAINING PROGRAM

## 2021-07-16 PROCEDURE — 36415 COLL VENOUS BLD VENIPUNCTURE: CPT

## 2021-07-16 PROCEDURE — 160048 HCHG OR STATISTICAL LEVEL 1-5: Performed by: STUDENT IN AN ORGANIZED HEALTH CARE EDUCATION/TRAINING PROGRAM

## 2021-07-16 RX ORDER — LIDOCAINE HYDROCHLORIDE 20 MG/ML
INJECTION, SOLUTION EPIDURAL; INFILTRATION; INTRACAUDAL; PERINEURAL PRN
Status: DISCONTINUED | OUTPATIENT
Start: 2021-07-16 | End: 2021-07-16 | Stop reason: SURG

## 2021-07-16 RX ORDER — PHENYLEPHRINE HCL IN 0.9% NACL 0.5 MG/5ML
SYRINGE (ML) INTRAVENOUS PRN
Status: DISCONTINUED | OUTPATIENT
Start: 2021-07-16 | End: 2021-07-16 | Stop reason: SURG

## 2021-07-16 RX ORDER — METOCLOPRAMIDE HYDROCHLORIDE 5 MG/ML
10 INJECTION INTRAMUSCULAR; INTRAVENOUS ONCE
Status: COMPLETED | OUTPATIENT
Start: 2021-07-16 | End: 2021-07-16

## 2021-07-16 RX ORDER — SODIUM CHLORIDE, SODIUM LACTATE, POTASSIUM CHLORIDE, CALCIUM CHLORIDE 600; 310; 30; 20 MG/100ML; MG/100ML; MG/100ML; MG/100ML
INJECTION, SOLUTION INTRAVENOUS CONTINUOUS
Status: DISCONTINUED | OUTPATIENT
Start: 2021-07-16 | End: 2021-07-16 | Stop reason: HOSPADM

## 2021-07-16 RX ORDER — ONDANSETRON 2 MG/ML
4 INJECTION INTRAMUSCULAR; INTRAVENOUS
Status: DISCONTINUED | OUTPATIENT
Start: 2021-07-16 | End: 2021-07-16 | Stop reason: HOSPADM

## 2021-07-16 RX ORDER — PEG-3350, SODIUM SULFATE, SODIUM CHLORIDE, POTASSIUM CHLORIDE, SODIUM ASCORBATE AND ASCORBIC ACID 7.5-2.691G
100 KIT ORAL 2 TIMES DAILY
Status: COMPLETED | OUTPATIENT
Start: 2021-07-16 | End: 2021-07-17

## 2021-07-16 RX ORDER — SODIUM CHLORIDE, SODIUM LACTATE, POTASSIUM CHLORIDE, CALCIUM CHLORIDE 600; 310; 30; 20 MG/100ML; MG/100ML; MG/100ML; MG/100ML
INJECTION, SOLUTION INTRAVENOUS
Status: DISCONTINUED | OUTPATIENT
Start: 2021-07-16 | End: 2021-07-16 | Stop reason: SURG

## 2021-07-16 RX ORDER — HALOPERIDOL 5 MG/ML
1 INJECTION INTRAMUSCULAR
Status: DISCONTINUED | OUTPATIENT
Start: 2021-07-16 | End: 2021-07-16 | Stop reason: HOSPADM

## 2021-07-16 RX ORDER — DIPHENHYDRAMINE HYDROCHLORIDE 50 MG/ML
12.5 INJECTION INTRAMUSCULAR; INTRAVENOUS
Status: DISCONTINUED | OUTPATIENT
Start: 2021-07-16 | End: 2021-07-16 | Stop reason: HOSPADM

## 2021-07-16 RX ADMIN — INSULIN HUMAN 1 UNITS: 100 INJECTION, SOLUTION PARENTERAL at 13:20

## 2021-07-16 RX ADMIN — LIDOCAINE HYDROCHLORIDE 60 MG: 20 INJECTION, SOLUTION EPIDURAL; INFILTRATION; INTRACAUDAL at 09:42

## 2021-07-16 RX ADMIN — SIMVASTATIN 20 MG: 20 TABLET, FILM COATED ORAL at 16:20

## 2021-07-16 RX ADMIN — EPHEDRINE SULFATE 10 MG: 50 INJECTION, SOLUTION INTRAVENOUS at 09:45

## 2021-07-16 RX ADMIN — PROPOFOL 150 MCG/KG/MIN: 10 INJECTION, EMULSION INTRAVENOUS at 09:42

## 2021-07-16 RX ADMIN — FLUOXETINE HYDROCHLORIDE 20 MG: 20 CAPSULE ORAL at 05:23

## 2021-07-16 RX ADMIN — METOCLOPRAMIDE 10 MG: 5 INJECTION, SOLUTION INTRAMUSCULAR; INTRAVENOUS at 07:30

## 2021-07-16 RX ADMIN — METOPROLOL TARTRATE 25 MG: 25 TABLET, FILM COATED ORAL at 05:23

## 2021-07-16 RX ADMIN — INSULIN HUMAN 2 UNITS: 100 INJECTION, SOLUTION PARENTERAL at 16:24

## 2021-07-16 RX ADMIN — SODIUM CHLORIDE, POTASSIUM CHLORIDE, SODIUM LACTATE AND CALCIUM CHLORIDE: 600; 310; 30; 20 INJECTION, SOLUTION INTRAVENOUS at 09:49

## 2021-07-16 RX ADMIN — Medication 100 MCG: at 09:45

## 2021-07-16 RX ADMIN — POLYETHYLENE GLYCOL 3350, SODIUM SULFATE, SODIUM CHLORIDE, POTASSIUM CHLORIDE, ASCORBIC ACID, SODIUM ASCORBATE 100 G: KIT at 16:20

## 2021-07-16 RX ADMIN — FENTANYL CITRATE 50 MCG: 50 INJECTION, SOLUTION INTRAMUSCULAR; INTRAVENOUS at 09:45

## 2021-07-16 ASSESSMENT — COGNITIVE AND FUNCTIONAL STATUS - GENERAL
SUGGESTED CMS G CODE MODIFIER MOBILITY: CK
SUGGESTED CMS G CODE MODIFIER MOBILITY: CJ
MOBILITY SCORE: 18
TURNING FROM BACK TO SIDE WHILE IN FLAT BAD: A LITTLE
MOVING TO AND FROM BED TO CHAIR: A LITTLE
MOVING TO AND FROM BED TO CHAIR: A LITTLE
MOVING FROM LYING ON BACK TO SITTING ON SIDE OF FLAT BED: A LITTLE
TURNING FROM BACK TO SIDE WHILE IN FLAT BAD: A LITTLE
CLIMB 3 TO 5 STEPS WITH RAILING: A LITTLE
HELP NEEDED FOR BATHING: A LITTLE
MOVING FROM LYING ON BACK TO SITTING ON SIDE OF FLAT BED: A LITTLE
WALKING IN HOSPITAL ROOM: A LITTLE
SUGGESTED CMS G CODE MODIFIER DAILY ACTIVITY: CI
TOILETING: A LITTLE
STANDING UP FROM CHAIR USING ARMS: A LITTLE
DAILY ACTIVITIY SCORE: 23
CLIMB 3 TO 5 STEPS WITH RAILING: A LITTLE
MOBILITY SCORE: 20
SUGGESTED CMS G CODE MODIFIER DAILY ACTIVITY: CI
DAILY ACTIVITIY SCORE: 23

## 2021-07-16 ASSESSMENT — LIFESTYLE VARIABLES
TOTAL SCORE: 0
HOW MANY TIMES IN THE PAST YEAR HAVE YOU HAD 5 OR MORE DRINKS IN A DAY: 0
AVERAGE NUMBER OF DAYS PER WEEK YOU HAVE A DRINK CONTAINING ALCOHOL: 0
CONSUMPTION TOTAL: NEGATIVE
ON A TYPICAL DAY WHEN YOU DRINK ALCOHOL HOW MANY DRINKS DO YOU HAVE: 0
EVER HAD A DRINK FIRST THING IN THE MORNING TO STEADY YOUR NERVES TO GET RID OF A HANGOVER: NO
TOTAL SCORE: 0
HAVE PEOPLE ANNOYED YOU BY CRITICIZING YOUR DRINKING: NO
ALCOHOL_USE: NO
TOTAL SCORE: 0
EVER FELT BAD OR GUILTY ABOUT YOUR DRINKING: NO
HAVE YOU EVER FELT YOU SHOULD CUT DOWN ON YOUR DRINKING: NO

## 2021-07-16 ASSESSMENT — PAIN DESCRIPTION - PAIN TYPE: TYPE: ACUTE PAIN

## 2021-07-16 ASSESSMENT — COPD QUESTIONNAIRES
DO YOU EVER COUGH UP ANY MUCUS OR PHLEGM?: NO/ONLY WITH OCCASIONAL COLDS OR INFECTIONS
HAVE YOU SMOKED AT LEAST 100 CIGARETTES IN YOUR ENTIRE LIFE: YES
DURING THE PAST 4 WEEKS HOW MUCH DID YOU FEEL SHORT OF BREATH: SOME OF THE TIME
COPD SCREENING SCORE: 5

## 2021-07-16 ASSESSMENT — FIBROSIS 4 INDEX: FIB4 SCORE: 0.95

## 2021-07-16 ASSESSMENT — PAIN SCALES - GENERAL: PAIN_LEVEL: 0

## 2021-07-16 NOTE — OR NURSING
1000 pt arrived from OR. Report received. On 8L mask. Denies pain and nausea at this time.     1010 Per MD. Pt Ok for clear liquids. Pt eating jello and ice chips.     1024 report called to Meek MUNOZ on [facility].     1040 phase 1 recovery complete. Pt back to tele 8 with RN on transport monitor.

## 2021-07-16 NOTE — PROGRESS NOTES
4 Eyes Skin Assessment Completed by ALEXANDER Rogers and ALEXANDER Fernandes.    Head WDL  Ears Redness and Blanching  Nose WDL  Mouth WDL  Neck WDL  Breast/Chest WDL  Shoulder Blades WDL  Spine WDL  (R) Arm/Elbow/Hand Redness, Blanching and Bruising  (L) Arm/Elbow/Hand Redness, Blanching and Bruising  Abdomen WDL  Groin Redness and Blanching  Scrotum/Coccyx/Buttocks Redness and Blanching  (R) Leg WDL, dry, flaky  (L) Leg WDL,dry, flaky  (R) Heel/Foot/Toe WDL,dry flaky  (L) Heel/Foot/Toe WDL dry, flaky      Devices In Places Tele Box and Nasal Cannula      Interventions In Place Gray Ear Foams, NC W/Ear Foams, Sacral Mepilex, Heels Loaded W/Pillows and Pressure Redistribution Mattress    Possible Skin Injury No    Pictures Uploaded Into Epic N/A  Wound Consult Placed N/A  RN Wound Prevention Protocol Ordered Yes

## 2021-07-16 NOTE — ANESTHESIA PREPROCEDURE EVALUATION
Relevant Problems   CARDIAC   (positive) Coronary artery disease involving native coronary artery of native heart without angina pectoris   (positive) Essential hypertension   (positive) PAD (peripheral artery disease) ([facility])   (positive) Stented coronary artery      ENDO   (positive) Type 2 diabetes mellitus ([facility])   (positive) Type 2 diabetes mellitus with ophthalmic complication ([facility])       Physical Exam    Airway   Mallampati: II  TM distance: >3 FB  Neck ROM: full       Cardiovascular - normal exam  Rhythm: regular  Rate: normal  (-) murmur     Dental - normal exam           Pulmonary - normal exam  Breath sounds clear to auscultation     Abdominal    Neurological - normal exam                 Anesthesia Plan    ASA 3   ASA physical status 3 criteria: CAD/stents (> 3 months)    Plan - MAC               Induction: intravenous    Postoperative Plan: Postoperative administration of opioids is intended.    Pertinent diagnostic labs and testing reviewed    Informed Consent:    Anesthetic plan and risks discussed with patient.    Use of blood products discussed with: patient whom consented to blood products.

## 2021-07-16 NOTE — PROGRESS NOTES
Assumed care of pt, received bedside report from ALEXANDER Fernandes. Pt sitting up in bed, no complaints of pain, room air, A/Ox4. Fall and safety precautions in place, strip alarm in place. Discussed POC with pt, pt verbalizes understanding. No further needs at this time.

## 2021-07-16 NOTE — PROGRESS NOTES
Pt to unit with ACLS RN on [facility]. Pt A&Ox4, no complaints of pain at this time. Family at bedside. Pt to unit with all belongings. Pt ambulated from gurney to room bathroom with no difficulties. Tele onitor placed. onitor room notified. Pt SR 76. Pt oriented to room, and POC. All questions answered at this time. Blood transfusion consent obtained.  Currently awaiting COD from blood bank to begin blood transfusion. Will continue to monitor. Fall precautions in place.Call light within reach.

## 2021-07-16 NOTE — ANESTHESIA POSTPROCEDURE EVALUATION
Patient: Kimberly Castañeda    Procedure Summary     Date: 07/16/21 Room / Location: [facility] ROOM 26 / SURGERY SAME DAY [facility]    Anesthesia Start: 0935 Anesthesia Stop: 1001    Procedures:       GASTROSCOPY (N/A Esophagus)      GASTROSCOPY, WITH BIOPSY (N/A Esophagus) Diagnosis: (NORMAL UPPER ENDOSCOPY)    Surgeons: Vickey Mane M.D. Responsible Provider: Yumiko Olson M.D.    Anesthesia Type: MAC ASA Status: 3          Final Anesthesia Type: MAC  Last vitals  BP   Blood Pressure : (!) 95/70 (Map 78)    Temp   36.9 °C (98.4 °F)    Pulse   69   Resp   16    SpO2   98 %      Anesthesia Post Evaluation    Patient location during evaluation: PACU  Patient participation: complete - patient participated  Level of consciousness: awake and alert  Pain score: 0    Airway patency: patent  Anesthetic complications: no  Cardiovascular status: hemodynamically stable  Respiratory status: acceptable  Hydration status: euvolemic    PONV: none          No complications documented.     Nurse Pain Score: 0 (NPRS)

## 2021-07-16 NOTE — ED NOTES
Med Rec completed: per patient at bedside with home med bottles   Preferred [facility]acy: [facility]  Allergies:  Allergies   Allergen Reactions   • Aldactone [Kdc:Yellow Dye+Spironolactone] Palpitations   • Percocet [Apap-Fd&C Red #40 [place]-Oxycodone] Rash     Full body       No ORAL antibiotics in last 14 days    Home Medications:  Medication Sig Comments   • glipiZIDE (GLUCOTROL) 10 MG Tab Take 10 mg by mouth 2 times a day.    • meloxicam (MOBIC) 7.5 MG Tab Take 7.5 mg by mouth at bedtime.    • pioglitazone (ACTOS) 45 MG Tab Take 45 mg by mouth every morning.    • metformin (GLUCOPHAGE) 1000 MG tablet Take 1,000-1,500 mg by mouth 2 times a day with meals.     1 tablet = 1000 mg every morning    1 1/2 tablet = 1500 mg every evening    • simvastatin (ZOCOR) 20 MG Tab Take 1 Tab by mouth every evening.    • metoprolol (LOPRESSOR) 25 MG Tab Take 1 Tab by mouth 2 times a day.    • FLUoxetine (PROZAC) 20 MG Cap Take 1 Cap by mouth every day.    • clopidogrel (PLAVIX) 75 MG Tab Take 1 Tab by mouth every day.    • ferrous sulfate (FE TABS) 325 (65 Fe) MG EC tablet Take 1 Tab by mouth 3 times a day, with meals.  (Patient not taking: Reported on 7/15/2021) STOPPED due to bleeding per patient.

## 2021-07-16 NOTE — OR NURSING
0835: pt arrive to pre op on RA, when placed on monitor bp low and O2 55%, changed arms with bp and 105/46, O2 probe switched and remains 47-55% on RA, pt aao, asymptomatic, placed on 4 L O2 and back up to 94%.    0850: pt weaned down to 1LNC at sats are 96%

## 2021-07-16 NOTE — PROGRESS NOTES
[facility] Medicine Daily Progress Note    Date of Service  7/16/2021    Chief Complaint  Kimberly Castañeda is a 75 y.o. female admitted 7/15/2021 with Melena.     Hospital Course  This is a 75 y.o. female with PMHX of CAD, HTN, DM who presented 7/15/2021 with Bloody stool. As per patient she was having dark tarry stools with some bright red blood that started on 7/11/21. Since then she has had 1-2 episodes of dark stool daily, with the last one occurring this morning. She has not had any vomiting, abdominal pain, or dysuria  Here in ER her hemoglobin is 7 and her guaiac is positive   I have ordered for 1 unit of RBC and patient has been started on pantoprazole drip   .GI Dr. Mullins was consulted by ERP, patient underwent EGD on HD#1.  EGD unremarkable.  [place] ON HD#2          Interval Problem Update  No acute overnight events.    I have personally seen and examined the patient at bedside. I discussed the plan of care with patient and GI.    Consultants/Specialty  GI    Code Status  Full Code    Disposition  Patient is not medically cleared.   Anticipate discharge to to home with close outpatient follow-up.  I have placed the appropriate orders for post-discharge needs.    Review of Systems  All systems reviewed and negative except as noted per above.    Physical Exam  Temp:  [36.2 °C (97.1 °F)-37.1 °C (98.7 °F)] 36.9 °C (98.4 °F)  Pulse:  [63-81] 69  Resp:  [16-20] 16  BP: ()/(35-93) 95/70  SpO2:  [55 %-100 %] 98 %    General: No acute distress  HEENT atraumatic, normocephalic, pupils equal round reactive to light  Neck: No JVD  Chest: Respirations are unlabored  Cardiac: Physiologic S1 and S2  Abdomen: Soft, nontender, nondistended  Extremities: Without clubbing, cyanosis or edema  Neuro: Cranial nerves II through XII are grossly intact.  Psych: No anxiety, judgement intact.        Current Facility-Administered Medications:   •  peg-electrolyte solution (MOVIPREP) package 100 g, 100 g, Oral, BID, Vickey  NIYAH Mane, 100 g at 07/16/21 1620  •  FLUoxetine (PROZAC) capsule 20 mg, 20 mg, Oral, DAILY, Deb Ramos M.D., 20 mg at 07/16/21 0523  •  metoprolol tartrate (LOPRESSOR) tablet 25 mg, 25 mg, Oral, BID, Deb Ramos M.D., 25 mg at 07/16/21 0523  •  simvastatin (ZOCOR) tablet 20 mg, 20 mg, Oral, Q EVENING, Deb Ramos M.D., 20 mg at 07/16/21 1620  •  senna-docusate (PERICOLACE or SENOKOT S) 8.6-50 MG per tablet 2 tablet, 2 tablet, Oral, BID **AND** polyethylene glycol/lytes (MIRALAX) PACKET 1 Packet, 1 Packet, Oral, QDAY PRN **AND** magnesium hydroxide (MILK OF MAGNESIA) suspension 30 mL, 30 mL, Oral, QDAY PRN **AND** bisacodyl (DULCOLAX) suppository 10 mg, 10 mg, Rectal, QDAY PRN, Deb Ramos M.D.  •  Respiratory Therapy Consult, , Nebulization, Continuous RT, Deb Ramos M.D.  •  NS infusion, , Intravenous, Continuous, Deb Ramos M.D., Last Rate: 83 mL/hr at 07/15/21 2159, New Bag at 07/15/21 2159  •  acetaminophen (Tylenol) tablet 650 mg, 650 mg, Oral, Q6HRS PRN, Deb Ramos M.D.  •  insulin regular (HumuLIN R,NovoLIN R) injection, 1-6 Units, Subcutaneous, Q6HRS, 2 Units at 07/16/21 1624 **AND** POC blood glucose manual result, , , Q6H **AND** NOTIFY MD and PharmD, , , Once **AND** glucose 4 g chewable tablet 16 g, 16 g, Oral, Q15 MIN PRN **AND** dextrose 50% (D50W) injection 50 mL, 50 mL, Intravenous, Q15 MIN PRN, Deb Ramos M.D.      Fluids    Intake/Output Summary (Last 24 hours) at 7/16/2021 1636  Last data filed at 7/16/2021 1001  Gross per 24 hour   Intake 855 ml   Output --   Net 855 ml       Laboratory  Recent Labs     07/15/21  1810 07/15/21  1810 07/15/21  2050 07/16/21  0305 07/16/21  1235   WBC 10.2  --  8.3 7.3  --    RBC 2.24*  --  2.04* 2.40*  --    HEMOGLOBIN 7.0*   < > 6.5* 7.4* 7.5*   HEMATOCRIT 23.3*  --  21.3* 23.9*  --    .0*  --  104.4* 99.6*  --    MCH 31.3  --  31.9 30.8  --    MCHC 30.0*  --  30.5* 31.0*  --    RDW 65.3*  --  64.8* 65.0*  --    PLATELETCT 286  --  225 224  --    MPV 10.1   --  9.8 9.9  --     < > = values in this interval not displayed.     Recent Labs     07/15/21  1810 07/15/21  2050 07/16/21  0305   SODIUM 139 139 139   POTASSIUM 4.8 5.1 4.8   CHLORIDE 102 103 105   CO2 27 28 29   GLUCOSE 246* 189* 174*   BUN 39* 35* 29*   CREATININE 0.72 0.63 0.65   CALCIUM 8.3* 8.0* 8.2*     Recent Labs     07/15/21  2050   APTT 24.9   INR 1.05               Imaging  DX-CHEST-PORTABLE (1 VIEW)   Final Result         1. No acute cardiopulmonary abnormalities are identified.      CT-CTA COMPLETE THORACOABDOMINAL AORTA   Final Result      1.  No evidence of abdominal aortic aneurysm. Ectatic abdominal aorta.   2.  Significant stenosis of the proximal left subclavian artery.   3.  Significant stenosis of the left renal artery ostium with slight hypoenhancement of the left kidney.   4.  Bilateral renal cysts.   5.  Colonic diverticulosis.   6.  Cholelithiasis.   7.  Cardiomegaly.   8.  Coronary artery calcification.   9.  Mild loss of height of T1, T2, T8 and T11 which is of indeterminate age but may be chronic.                                Assessment/Plan  Anemia  Assessment & Plan  Due to GI bleed   A unit of RBC has been ordered  Will check H&H every 8 hr     GI bleed  Assessment & Plan  Patient present with dark tarry stools with some bright red blood going on since 7/11  Here her hemoglobin is 7, and also her guaiac test is postive  PPI to PO post EGD.  [place] on HD#2.    GI assist appreciated.      Coronary artery disease involving native coronary artery of native heart without angina pectoris- (present on admission)  Assessment & Plan  Holding her plavix and asa due to GI bleed     Type 2 diabetes mellitus (HCC)  Assessment & Plan  Holding her metformin  Starting ssi and accuchecks   Will check a A1c     Dyslipidemia- (present on admission)  Assessment & Plan  Cont her statin        VTE prophylaxis: SCDs/TEDs    I have performed a physical exam and reviewed and updated ROS and Plan today  (7/16/2021). In review of yesterday's note (7/15/2021), there are no changes except as documented above.

## 2021-07-16 NOTE — HOSPITAL COURSE
This is a 75 y.o. female with PMHX of CAD, HTN, DM who presented 7/15/2021 with Bloody stool. As per patient she was having dark tarry stools with some bright red blood that started on 7/11/21. Since then she has had 1-2 episodes of dark stool daily, with the last one occurring this morning. She has not had any vomiting, abdominal pain, or dysuria  Here in ER her hemoglobin is 7 and her guaiac is positive   I have ordered for 1 unit of RBC and patient has been started on pantoprazole drip   .GI Dr. Mullins was consulted by ERP, patient underwent EGD on HD#1.  EGD unremarkable.  [place] ON HD#2

## 2021-07-16 NOTE — CARE PLAN
Problem: Knowledge Deficit - Standard  Goal: Patient and family/care givers will demonstrate understanding of plan of care, disease process/condition, diagnostic tests and medications  Outcome: Progressing     Problem: Fall Risk  Goal: Patient will remain free from falls  Outcome: Progressing   The patient is Watcher - Medium risk of patient condition declining or worsening    Shift Goals  Clinical Goals: Stabalize Hb  Patient Goals: Rest    Progress made toward(s) clinical / shift goals:  Pt is actively participating in care.     Patient is not progressing towards the following goals:

## 2021-07-16 NOTE — ANESTHESIA TIME REPORT
Anesthesia Start and Stop Event Times     Date Time Event    7/16/2021 0925 Ready for Procedure     0935 Anesthesia Start     1001 Anesthesia Stop        Responsible Staff  07/16/21    Name Role Begin End    Yumiko Olson M.D. Anesth 0935 1001        Preop Diagnosis (Free Text):  Pre-op Diagnosis     Melena        Preop Diagnosis (Codes):    Post op Diagnosis  Melena      Premium Reason  Non-Premium    Comments:

## 2021-07-16 NOTE — PROGRESS NOTES
Gastroenterology Progress Note     Author: Vickey Mane M.D.   Date & Time Created: 7/16/2021 9:30 AM    Chief Complaint:  Melena    Interval History:  July 16 HPI  75 y.o. female with CAD, PAD, DM, possible COPD who presented 7/15/2021 with melena and anemia.    She notes developing dark black stools 1-2 times daily starting 4 days ago.  Last BM this morning at 10:30 AM.  Describes small volume.  No associated hematochezia, abdominal pain, nausea, vomiting, heartburn, dysphagia.  Has some associated dyspnea and dizziness.  No chest pain.  No recent weight loss.     She does take aspirin and meloxicam daily.  Also on Plavix.     She did have colonoscopy 3 years ago in [place] and had polyps.     She did have Moderna COVID vaccine x2     Hemodynamically stable.  Labs show Hgb 7.0 with BUN 39.  Labs from 6/2021 showed Hgb 15.    July 17th: She denies having had additional bowel movements.  Denies vomiting today.    Review of Systems:  ROS   A complete 12 point review of systems was performed.    Constitutional: Denies fevers, chills, night sweats; Denies weight changes  Eyes: Denies eye pain, denies eye discharge  Ears/Nose/Throat/Mouth: Denies nasal congestion or sore throat   Cardiovascular: Denies chest pain or palpitations.  Respiratory: Denies shortness of breath, cough, and wheezing.  Gastrointestinal/Hepatic: see HPI  Genitourinary: Denies dysuria, increased frequency, hematuria  Musculoskeletal/Rheum: Denies joint pain and swelling, denies edema  Skin: Denies rash, denies wounds  Neurological: Denies headache, confusion, memory loss or focal weakness/parasthesias  Psychiatric: denies mood disorder, denies hallucinations   Endocrine: Denies thyroid problems; denies polydipsia  Heme/Oncology/Lymph Nodes: Denies enlarged lymph nodes, denies bruising or known bleeding disorder        Physical Exam:  Physical Exam  Abdominal:      General: Abdomen is flat. Bowel sounds are normal. There is no distension.       Palpations: There is no mass.      Tenderness: There is no abdominal tenderness. There is no guarding or rebound.      Hernia: No hernia is present.   Musculoskeletal:      Cervical back: Normal range of motion and neck supple.      Right lower leg: No edema.      Left lower leg: No edema.   Skin:     General: Skin is warm and dry.      Coloration: Skin is not jaundiced.  Labs:          Recent Labs     07/15/21  1810 07/15/21  2050 07/16/21  0305   SODIUM 139 139 139   POTASSIUM 4.8 5.1 4.8   CHLORIDE 102 103 105   CO2 27 28 29   BUN 39* 35* 29*   CREATININE 0.72 0.63 0.65   CALCIUM 8.3* 8.0* 8.2*     Recent Labs     07/15/21  1810 07/15/21  2050 07/16/21  0305   ALTSGPT 15 14 13   ASTSGOT 17 15 10*   ALKPHOSPHAT 49 43 42   TBILIRUBIN 0.2 0.2 0.6   LIPASE 44  --   --    GLUCOSE 246* 189* 174*     Recent Labs     07/15/21  1810 07/15/21  2050 07/16/21  0305   RBC 2.24* 2.04* 2.40*   HEMOGLOBIN 7.0* 6.5* 7.4*   HEMATOCRIT 23.3* 21.3* 23.9*   PLATELETCT 286 225 224   PROTHROMBTM  --  13.4  --    APTT  --  24.9  --    INR  --  1.05  --      Recent Labs     07/15/21  1810 07/15/21  2050 07/16/21  0305   WBC 10.2 8.3 7.3   NEUTSPOLYS 70.50 66.40 65.60   LYMPHOCYTES 20.60* 23.80 22.20   MONOCYTES 6.20 7.60 10.20   EOSINOPHILS 0.30 0.20 0.40   BASOPHILS 0.50 0.50 0.40   ASTSGOT 17 15 10*   ALTSGPT 15 14 13   ALKPHOSPHAT 49 43 42   TBILIRUBIN 0.2 0.2 0.6     Hemodynamics:  Temp (24hrs), Av.6 °C (97.9 °F), Min:36.3 °C (97.3 °F), Max:37.1 °C (98.7 °F)  Temperature: 36.3 °C (97.3 °F)  Pulse  Av.1  Min: 63  Max: 78   Blood Pressure : 125/61     Respiratory:    Respiration: 16, Pulse Oximetry: 98 %        RUL Breath Sounds: Clear, RML Breath Sounds: Diminished, RLL Breath Sounds: Diminished, JONNATHAN Breath Sounds: Diminished, LLL Breath Sounds: Diminished  Fluids:    Intake/Output Summary (Last 24 hours) at 2021 0930  Last data filed at 2021 0215  Gross per 24 hour   Intake 555 ml   Output --   Net 555 ml      Weight: 69.1 kg (152 lb 5.4 oz)  GI/Nutrition:  Orders Placed This Encounter   Procedures   • Diet Order Diet: Clear Liquid; Miscellaneous modifications: (optional): No Red     Standing Status:   Standing     Number of Occurrences:   1     Order Specific Question:   Diet:     Answer:   Clear Liquid [10]     Order Specific Question:   Miscellaneous modifications: (optional)     Answer:   No Red [61]   • Diet NPO     No Red Foods. Sips of clear liquids to take medications up to 2 hours prior to procedure.     Standing Status:   Standing     Number of Occurrences:   8     Order Specific Question:   Restrict to:     Answer:   Sips with Medications [3]     Medical Decision Making, by Problem:  Active Hospital Problems    Diagnosis    • Anemia [D64.9]    • GI bleed [K92.2]    • Dyslipidemia [E78.5]    • Coronary artery disease involving native coronary artery of native heart without angina pectoris [I25.10]    • Type 2 diabetes mellitus (HCC) [E11.9]        Plan:  PROBLEMS:  1. Melena  2. Acute blood loss anemia  3. Azotemia likely secondary to UGI bleed  4. Anticoagulant use with aspirin and PLavix  5. Long term NSAID use  6. CAD  7. PAD  8. DM  9. COPD    EGD today found no abnormalities.  Gastric biopsies taken to rule out H. Pylori.    Discussed with patient.  She remembers she had several small polyps taken out in her colonoscopy in 2018.  CT scan shows diverticulosis.  Query if she has a diverticular bleed.  She states she has no abdominal pain.  Proposed watching and waiting versus doing a colonoscopy.  She is inclined to do a colonoscopy.    PLAN:  -Avoid NSAIDs indefinitely  -Sliding scale insulin  -N.p.o. after midnight except for sips of meds  Split dose bowel prep today  Clear liquid diet today  Plan for colonoscopy tomorrow TBA with Dr. Ochoa  -Continue aspirin  Hold Plavix    My total time spent caring for the patient on the day of the encounter was 61 minutes.   This does not include time spent on  separately billable procedures/tests.    Vickey Mane MD, MEd  [facility]        Quality-Core Measures

## 2021-07-16 NOTE — CONSULTS
Gastroenterology Consultation    Date of Service  7/15/2021    Referring Physician  Deb Ramos M.D.    Consulting Physician  Yonathan Mullins M.D.    Reason for Consultation  Melena    History of Presenting Illness  75 y.o. female with CAD, PAD, DM, possible COPD who presented 7/15/2021 with melena and anemia.    She notes developing dark black stools 1-2 times daily starting 4 days ago.  Last BM this morning at 10:30 AM.  Describes small volume.  No associated hematochezia, abdominal pain, nausea, vomiting, heartburn, dysphagia.  Has some associated dyspnea and dizziness.  No chest pain.  No recent weight loss.    She does take aspirin and meloxicam daily.  Also on Plavix.    She did have colonoscopy 3 years ago in [place] and had polyps.    She did have Moderna COVID vaccine x2    Hemodynamically stable.  Labs show Hgb 7.0 with BUN 39.  Labs from 6/2021 showed Hgb 15.    Review of Systems  Review of Systems   Constitutional: Positive for malaise/fatigue.   Respiratory: Positive for cough and shortness of breath.    Gastrointestinal: Positive for melena.   Neurological: Positive for dizziness.   All other systems reviewed and are negative.      Past Medical History   has a past medical history of Abnormal mammogram (3/22/2018), Arthritis/arthropathy of multiple joints (12/12/2018), Back pain, BRBPR (bright red blood per rectum) (6/1/2017), Bronchitis, Cardiac arrhythmia, Cellulitis and abscess of hand (12/12/2018), Chickenpox, Controlled type 2 diabetes mellitus without complication (HCC) (4/27/2017), Coronary artery disease involving native coronary artery of native heart without angina pectoris (4/27/2017), Cough, Diarrhea, Dyslipidemia (4/27/2017), Essential hypertension (4/27/2017), Exposure (9/20/2017), [place] measles, GI bleed, H/O colonoscopy with polypectomy (6/1/2017), History of recent dental procedure (10/16/2019), Hypoxemia (4/27/2017), Lipoma of torso (7/12/2018), Low hemoglobin (10/16/2019), Mumps,  Obesity, PAD (peripheral artery disease) ([facility]) (4/27/2017), Painful joint, Post herpetic neuralgia (8/22/2019), Reactive depression (4/27/2017), Rhinitis, Shingles outbreak (06/2019), Sore muscles, Swelling of lower extremity, Tobacco abuse (4/27/2017), Tonsillitis, Type 2 diabetes mellitus with ophthalmic complication ([facility]) (3/22/2018), Valvular heart disease, Wears glasses, Weight loss, and Wheezing. She also has no past medical history of DVT (deep venous thrombosis) ([facility]).    Surgical History   has a past surgical history that includes stent placement (2007); tubal ligation (1983); hysterectomy laparoscopy (1988); appendectomy; vein stripping; and tonsillectomy.    Family History  family history includes Alcohol/Drug in her father; Arthritis in her father; Cancer in her maternal aunt and mother; Diabetes in her brother; Heart Disease in her father.    Social History   reports that she has quit smoking. Her smoking use included cigarettes. She has a 15.00 pack-year smoking history. She has never used smokeless tobacco. She reports that she does not drink alcohol and does not use drugs.    Medications    Current Facility-Administered Medications:   •  [START ON 7/16/2021] FLUoxetine  •  metoprolol tartrate  •  simvastatin  •  senna-docusate **AND** polyethylene glycol/lytes **AND** magnesium hydroxide **AND** bisacodyl  •  Respiratory Therapy Consult  •  NS  •  acetaminophen  •  pantoprazole (PROTONIX) infusion  •  [START ON 7/16/2021] insulin regular **AND** POC blood glucose manual result **AND** NOTIFY MD and PharmD **AND** glucose **AND** dextrose 50%    Current Outpatient Medications:   •  glipiZIDE, 10 mg, Oral, BID, 7/15/2021 at 0800  •  meloxicam, 7.5 mg, Oral, QHS, 7/14/2021 at PM  •  pioglitazone, 45 mg, Oral, QAM, 7/15/2021 at 0800  •  metformin, 1,000-1,500 mg, Oral, BID WITH MEALS, 7/15/2021 at 0800  •  simvastatin, 20 mg, Oral, Q EVENING, 7/14/2021 at PM  •  metoprolol tartrate, 25 mg, Oral, BID,  7/15/2021 at 0800  •  FLUoxetine, 20 mg, Oral, DAILY, 7/15/2021 at 0800  •  clopidogrel, 75 mg, Oral, DAILY, 7/15/2021 at 0800  •  ferrous sulfate, 325 mg, Oral, TID WITH MEALS (Patient not taking: Reported on 7/15/2021), 7/11/2021 at STOPPED  •  nitroglycerin, 0.4 mg, Sublingual, PRN, PRN at PRN  •  NON SPECIFIED, Portable Oxygen Dx: COPD, supply at supply      Allergies  Allergies   Allergen Reactions   • Aldactone [Kdc:Yellow Dye+Spironolactone] Palpitations   • Percocet [Apap-Fd&C Red #40 [place]-Oxycodone] Rash     Full body       Physical Exam  Temp:  [37.1 °C (98.7 °F)] 37.1 °C (98.7 °F)  Pulse:  [65-75] 68  Resp:  [16-20] 17  BP: ()/(35-59) 116/54  SpO2:  [95 %-99 %] 97 %    Physical Exam  Vitals and nursing note reviewed.   Constitutional:       General: She is not in acute distress.     Appearance: Normal appearance. She is not ill-appearing or toxic-appearing.   HENT:      Head: Normocephalic and atraumatic.      Mouth/Throat:      Mouth: Mucous membranes are dry.      Pharynx: Oropharynx is clear.   Eyes:      General: No scleral icterus.     Extraocular Movements: Extraocular movements intact.      Conjunctiva/sclera: Conjunctivae normal.      Pupils: Pupils are equal, round, and reactive to light.   Cardiovascular:      Rate and Rhythm: Normal rate and regular rhythm.      Pulses: Normal pulses.      Heart sounds: Murmur heard.     Pulmonary:      Effort: Pulmonary effort is normal. No respiratory distress.      Breath sounds: Normal breath sounds. No wheezing or rales.   Abdominal:      General: Abdomen is flat. Bowel sounds are normal. There is no distension.      Palpations: There is no mass.      Tenderness: There is no abdominal tenderness. There is no guarding or rebound.      Hernia: No hernia is present.   Musculoskeletal:      Cervical back: Normal range of motion and neck supple.      Right lower leg: No edema.      Left lower leg: No edema.   Skin:     General: Skin is warm and dry.       Coloration: Skin is not jaundiced.   Neurological:      General: No focal deficit present.      Mental Status: She is alert and oriented to person, place, and time.   Psychiatric:         Mood and Affect: Mood normal.         Behavior: Behavior normal.         Fluids  Date 07/15/21 0700 - 07/16/21 0659   Shift 9059-1141 6924-2816 9757-9714 24 Hour Total   INTAKE   IV Piggyback  100  100   Shift Total  100  100   OUTPUT   Shift Total       Weight (kg)  74.8 74.8 74.8       Laboratory  Recent Labs     07/15/21  1810 07/15/21  2050   WBC 10.2 8.3   RBC 2.24* 2.04*   HEMOGLOBIN 7.0* 6.5*   HEMATOCRIT 23.3* 21.3*   .0* 104.4*   MCH 31.3 31.9   MCHC 30.0* 30.5*   RDW 65.3* 64.8*   PLATELETCT 286 225   MPV 10.1 9.8     Recent Labs     07/15/21  1810   SODIUM 139   POTASSIUM 4.8   CHLORIDE 102   CO2 27   GLUCOSE 246*   BUN 39*   CREATININE 0.72   CALCIUM 8.3*                     Imaging  DX-CHEST-PORTABLE (1 VIEW)   Final Result         1. No acute cardiopulmonary abnormalities are identified.      CT-CTA COMPLETE THORACOABDOMINAL AORTA   Final Result      1.  No evidence of abdominal aortic aneurysm. Ectatic abdominal aorta.   2.  Significant stenosis of the proximal left subclavian artery.   3.  Significant stenosis of the left renal artery ostium with slight hypoenhancement of the left kidney.   4.  Bilateral renal cysts.   5.  Colonic diverticulosis.   6.  Cholelithiasis.   7.  Cardiomegaly.   8.  Coronary artery calcification.   9.  Mild loss of height of T1, T2, T8 and T11 which is of indeterminate age but may be chronic.                                 Assessment/Plan  74 y/o with CAD, PAD, DM, COPD on aspirin, meloxicam, Plavix that presented for melena and acute blood loss anemia.  Suspect UGI bleed, likely PUD versus AVM versus GI neoplasia versus other.  Had colonoscopy 3 years ago with polyps.    PROBLEMS:  1. Melena  2. Acute blood loss anemia  3. Azotemia likely secondary to UGI bleed  4.  Anticoagulant use with aspirin and PLavix  5. Long term NSAID use  6. CAD  7. PAD  8. DM  9. COPD    PLAN:  1. Protonix drip  2. EGD tomorrow with Dr. Mane  3. Clear liquid diet tonight and NPO at midnight  4. Serial H/H q8  5. Transfuse 1U PRBC  6. Check TEG with platelet mapping  7. Hold aspirin and Plavix and meloxicam

## 2021-07-16 NOTE — PROCEDURES
Esophagogastroduodenoscopy    Indications: Melena  Previous procedures: egd  Instrument: Olympus Flexible Endoscope  Sedation: MAC    Pre-Anesthesia Assessment:  Prior to the procedure, a History and Physical was performed, and patient medications and allergies were reviewed. The patient’s tolerance of previous anesthesia was also reviewed. The risks and benefits of the procedure and the sedation options and risks were discussed with the patient including but not limited to infection, bleeding, aspiration, perforation, adverse medication reaction, missed diagnosis, and missed lesions. The patient verbalized understanding. All questions were answered, and informed consent was obtained.     Prior Anticoagulants: Patient has taken aspirin Plavix  ASA Grade Assessment: Three    After I obtained informed consent from the patient, the patient was placed in the left lateral position. Appropriate time-out protocol was followed: the correct patient, the correct procedure, and the correct equipment in the room were confirmed. Throughout the procedure, the patient’s blood pressure, pulse, and oxygen saturations were monitored continuously. The Olympus flexible gastroscope was gently passed through the incisoral orifice into the oral cavity and under direct visualization the esophagus was intubated. The endoscope was passed down the esophagus, through the stomach and into the 2nd portion of the duodenum. Retroflexion was performed at the gastroesophageal junction. Color, texture, mucosa and anatomy of esophagus, stomach, and duodenum were carefully examined with the scope.  After completion of the examination, the endoscope as removed. The patient tolerated the procedure well. There were no immediate postoperative complications.       Findings/Impressions: Esophagus: Normal mucosa  Stomach: Normal mucosa.  Gastric biopsies taken to rule out H. Pylori.  No fresh or old blood.  Duodenum: Normal mucosa.  No fresh or old  blood      Recommendations: Follow-up gastric path  Stop all NSAIDs  Can stop PPI  -Patient is willing to have a colonoscopy to rule out a lower GI source.  Query if she has diverticular bleed.  -Clear liquid diet today  -Split dose bowel prep today  -N.p.o. after midnight except for sips of meds  -Plan for colonoscopy tomorrow      Vickey Mane MD, [facility]  [facility]        This note was generated using voice recognition software which has a small chance of producing errors of grammar and possibly content. I have made every reasonable attempt to find and correct any obvious errors, but expect that some may not be found prior to finalization of this note    Vickey Mane MD, [facility]  [facility]

## 2021-07-16 NOTE — ASSESSMENT & PLAN NOTE
Patient present with dark tarry stools with some bright red blood going on since 7/11  Here her hemoglobin is 7, and also her guaiac test is postive  PPI to PO post EGD.  [place] on HD#2.    GI assist appreciated.    CLD on HD#1, advance per GI.  Recheck H&H in AM.

## 2021-07-16 NOTE — ED TRIAGE NOTES
Kimberly Castañeda  75 y.o. female  Chief Complaint   Patient presents with   • Weakness     Pt BIB EMS for complaints of weakness onset 7/11/21.   • Bloody Stools     Pt complains black tary stools x 7/11/21 onset 7/11/21. Pt denies emesis or pain.     Pt presents with deviation in systolic BP >30 as noted in Vitals Flow-sheets.    Pt ambulatory to triage with steady gait for above complaint.   Pt is alert and oriented, speaking in full sentences, follows commands and responds appropriately to questions. Resp are even and unlabored. No behavioral indicators of pain.   Pt placed in lobby. Pt educated on triage process. Pt encouraged to alert staff for any changes. This RN masked and in appropriate PPE during encounter.

## 2021-07-16 NOTE — H&P
Hospital Medicine History & Physical Note    Date of Service  7/15/2021    Primary Care Physician  Bud Casas M.D.    Consultants  GI: Dr. Mullins     Code Status  Full Code    Chief Complaint  Chief Complaint   Patient presents with   • Weakness     Pt BIB EMS for complaints of weakness onset 7/11/21.   • Bloody Stools     Pt complains black tary stools x 7/11/21 onset 7/11/21. Pt denies emesis or pain.       History of Presenting Illness  This is a 75 y.o. female with PMHX of CAD, HTN, DM who presented 7/15/2021 with Bloody stool. As per patient she was having dark tarry stools with some bright red blood that started on 7/11/21. Since then she has had 1-2 episodes of dark stool daily, with the last one occurring this morning. She has not had any vomiting, abdominal pain, or dysuria  Here in ER her hemoglobin is 7 and her guaiac is positive   I have ordered for 1 unit of RBC and patient has been started on pantoprazole drip   .GI Dr. Mullins has been consulted by ERP  Patient will be admitted for further treatment     I discussed the plan of care with patient.    Review of Systems  Review of Systems   Constitutional: Negative for chills and fever.   HENT: Negative for ear discharge and ear pain.    Eyes: Negative for pain.   Respiratory: Negative for cough and shortness of breath.    Cardiovascular: Negative for chest pain, orthopnea and claudication.   Gastrointestinal: Positive for blood in stool. Negative for abdominal pain, diarrhea, nausea and vomiting.   Genitourinary: Negative for dysuria and urgency.   Musculoskeletal: Negative for back pain and myalgias.   Neurological: Positive for dizziness. Negative for headaches.   All other systems reviewed and are negative.      Past Medical History   has a past medical history of Abnormal mammogram (3/22/2018), Arthritis/arthropathy of multiple joints (12/12/2018), Back pain, BRBPR (bright red blood per rectum) (6/1/2017), Bronchitis, Cardiac arrhythmia,  Cellulitis and abscess of hand (12/12/2018), Chickenpox, Controlled type 2 diabetes mellitus without complication (HCC) (4/27/2017), Coronary artery disease involving native coronary artery of native heart without angina pectoris (4/27/2017), Cough, Diarrhea, Dyslipidemia (4/27/2017), Essential hypertension (4/27/2017), Exposure (9/20/2017), [place] measles, GI bleed, H/O colonoscopy with polypectomy (6/1/2017), History of recent dental procedure (10/16/2019), Hypoxemia (4/27/2017), Lipoma of torso (7/12/2018), Low hemoglobin (10/16/2019), Mumps, Obesity, PAD (peripheral artery disease) ([facility]) (4/27/2017), Painful joint, Post herpetic neuralgia (8/22/2019), Reactive depression (4/27/2017), Rhinitis, Shingles outbreak (06/2019), Sore muscles, Swelling of lower extremity, Tobacco abuse (4/27/2017), Tonsillitis, Type 2 diabetes mellitus with ophthalmic complication ([facility]) (3/22/2018), Valvular heart disease, Wears glasses, Weight loss, and Wheezing.    Surgical History   has a past surgical history that includes stent placement (2007); tubal ligation (1983); hysterectomy laparoscopy (1988); appendectomy; vein stripping; and tonsillectomy.     Family History  family history includes Alcohol/Drug in her father; Arthritis in her father; Cancer in her maternal aunt and mother; Diabetes in her brother; Heart Disease in her father.       Social History   reports that she has quit smoking. Her smoking use included cigarettes. She has a 15.00 pack-year smoking history. She has never used smokeless tobacco. She reports that she does not drink alcohol and does not use drugs.    Allergies  Allergies   Allergen Reactions   • Aldactone [Kdc:Yellow Dye+Spironolactone] Palpitations   • Percocet [Apap-Fd&C Red #40 [place]-Oxycodone] Rash     Full body       Medications  Prior to Admission Medications   Prescriptions Last Dose Informant Patient Reported? Taking?   Empagliflozin 25 MG Tab   Yes No   Sig: Take 25 mg by mouth every day.    Empagliflozin 25 MG Tab   No No   Sig: Take 25 mg by mouth every day.   FLUoxetine (PROZAC) 20 MG Cap   No No   Sig: Take 1 Cap by mouth every day.   NON SPECIFIED   No No   Sig: Portable Oxygen  Dx: COPD   amoxicillin (AMOXIL) 500 MG Cap   Yes No   amoxicillin-clavulanate (AUGMENTIN) 875-125 MG Tab   No No   Sig: Take 1 Tab by mouth 2 times a day.   aspirin (ASA) 81 MG Chew Tab chewable tablet   Yes No   Sig: Take 81 mg by mouth every day.   benazepril (LOTENSIN) 40 MG tablet   No No   Sig: Take 1 Tab by mouth every day.   clopidogrel (PLAVIX) 75 MG Tab   No No   Sig: Take 1 Tab by mouth every day.   ferrous sulfate (FE TABS) 325 (65 Fe) MG EC tablet Not Taking at Unknown time  No No   Sig: Take 1 Tab by mouth 3 times a day, with meals.   Patient not taking: Reported on 7/15/2021   gabapentin (NEURONTIN) 100 MG Cap   Yes No   glipiZIDE (GLUCOTROL) 10 MG Tab   No No   Sig: TAKE ONE TABLET BY MOUTH TWICE A DAY   meloxicam (MOBIC) 7.5 MG Tab   No No   Sig: Take 1 Tab by mouth every day.   metformin (GLUCOPHAGE) 1000 MG tablet   No No   Sig: Take one tablet orally every morning and one and one-half tablet at bedtime   metoprolol (LOPRESSOR) 25 MG Tab   No No   Sig: Take 1 Tab by mouth 2 times a day.   nitroglycerin (NITROSTAT) 0.4 MG SL Tab   No No   Sig: Place 1 Tab under tongue as needed for Chest Pain.   phenazopyridine (PYRIDIUM) 200 MG Tab   No No   Sig: Take 1 Tab by mouth 3 times a day as needed.   pioglitazone (ACTOS) 45 MG Tab   No No   Sig: Take 1 Tab by mouth every day.   simvastatin (ZOCOR) 20 MG Tab   No No   Sig: Take 1 Tab by mouth every evening.      Facility-Administered Medications: None       Physical Exam  Temp:  [37.1 °C (98.7 °F)] 37.1 °C (98.7 °F)  Pulse:  [65-75] 75  Resp:  [16-20] 20  BP: ()/(35-59) 119/59  SpO2:  [95 %-99 %] 99 %    Physical Exam  Vitals and nursing note reviewed.   Eyes:      General: No scleral icterus.        Right eye: No discharge.         Left eye: No discharge.    Cardiovascular:      Rate and Rhythm: Normal rate.      Heart sounds: No gallop.    Pulmonary:      Effort: Pulmonary effort is normal. No respiratory distress.      Breath sounds: No wheezing or rales.   Abdominal:      General: There is no distension.      Tenderness: There is no abdominal tenderness. There is no guarding or rebound.   Skin:     General: Skin is warm.      Coloration: Skin is not jaundiced.   Neurological:      General: No focal deficit present.      Mental Status: She is alert and oriented to person, place, and time. Mental status is at baseline.         Laboratory:  Recent Labs     07/15/21  1810   WBC 10.2   RBC 2.24*   HEMOGLOBIN 7.0*   HEMATOCRIT 23.3*   .0*   MCH 31.3   MCHC 30.0*   RDW 65.3*   PLATELETCT 286   MPV 10.1     Recent Labs     07/15/21  1810   SODIUM 139   POTASSIUM 4.8   CHLORIDE 102   CO2 27   GLUCOSE 246*   BUN 39*   CREATININE 0.72   CALCIUM 8.3*     Recent Labs     07/15/21  1810   ALTSGPT 15   ASTSGOT 17   ALKPHOSPHAT 49   TBILIRUBIN 0.2   LIPASE 44   GLUCOSE 246*         No results for input(s): NTPROBNP in the last 72 hours.      No results for input(s): TROPONINT in the last 72 hours.    Imaging:  DX-CHEST-PORTABLE (1 VIEW)   Final Result         1. No acute cardiopulmonary abnormalities are identified.      CT-CTA COMPLETE THORACOABDOMINAL AORTA   Final Result      1.  No evidence of abdominal aortic aneurysm. Ectatic abdominal aorta.   2.  Significant stenosis of the proximal left subclavian artery.   3.  Significant stenosis of the left renal artery ostium with slight hypoenhancement of the left kidney.   4.  Bilateral renal cysts.   5.  Colonic diverticulosis.   6.  Cholelithiasis.   7.  Cardiomegaly.   8.  Coronary artery calcification.   9.  Mild loss of height of T1, T2, T8 and T11 which is of indeterminate age but may be chronic.                               X-Ray:  No acute cardiopulmonary abnormalities are identified    Assessment/Plan:  I anticipate  this patient will require at least two midnights for appropriate medical management, necessitating inpatient admission.    Anemia  Assessment & Plan  Due to GI bleed   A unit of RBC has been ordered  Will check H&H every 8 hr     GI bleed  Assessment & Plan  Patient present with dark tarry stools with some bright red blood going on since 7/11  Here her hemoglobin is 7, and also her guaiac test is postive  1 unit of RBC ordered  Started patient on pantoprazole drip and also IVF  Npo for now  GI has been consulted by ERP  Appreciate rec.    Coronary artery disease involving native coronary artery of native heart without angina pectoris- (present on admission)  Assessment & Plan  Holding her plavix and asa due to GI bleed     Type 2 diabetes mellitus (HCC)  Assessment & Plan  Holding her metformin  Starting ssi and accuchecks   Will check a A1c     Dyslipidemia- (present on admission)  Assessment & Plan  Cont her statin       VTE prophylaxis: SCDs/TEDs

## 2021-07-16 NOTE — ED PROVIDER NOTES
ER Provider Note     Scribed for Sebas Hernandez M.D. by Benton Abbott. 7/15/2021, 6:07 PM.    Primary Care Provider: Claudia Kapadia P.A.-C.  Means of Arrival: EMS   History obtained from: Patient  History limited by: None     CHIEF COMPLAINT  Chief Complaint   Patient presents with    Weakness     Pt BIB EMS for complaints of weakness onset 7/11/21.    Bloody Stools     Pt complains black tary stools x 7/11/21 onset 7/11/21. Pt denies emesis or pain.       HPI  Kimberly Castañeda is a 75 y.o. female who presents to the Emergency Department via EMS for evaluation of acute generalized weakness onset 7/11/21. She has also been having dark tarry stools with some bright red blood present that also started on 7/11. Since then she has had 1-2 episodes of dark stool daily, with the last one occurring this morning. She has not had any vomiting, abdominal pain, or dysuria. She is on supplemental oxygen at baseline, but she denies any acute chest pain or shortness of breath.    REVIEW OF SYSTEMS  See [facility] for further details. All other systems are negative.     PAST MEDICAL HISTORY   has a past medical history of Abnormal mammogram (3/22/2018), Arthritis/arthropathy of multiple joints (12/12/2018), Back pain, BRBPR (bright red blood per rectum) (6/1/2017), Bronchitis, Cardiac arrhythmia, Cellulitis and abscess of hand (12/12/2018), Chickenpox, Controlled type 2 diabetes mellitus without complication (HCC) (4/27/2017), Coronary artery disease involving native coronary artery of native heart without angina pectoris (4/27/2017), Cough, Diarrhea, Dyslipidemia (4/27/2017), Essential hypertension (4/27/2017), Exposure (9/20/2017), Georgian measles, GI bleed, H/O colonoscopy with polypectomy (6/1/2017), History of recent dental procedure (10/16/2019), Hypoxemia (4/27/2017), Lipoma of torso (7/12/2018), Low hemoglobin (10/16/2019), Mumps, Obesity, PAD (peripheral artery disease) ([facility]) (4/27/2017), Painful joint, Post herpetic neuralgia  (8/22/2019), Reactive depression (4/27/2017), Rhinitis, Shingles outbreak (06/2019), Sore muscles, Swelling of lower extremity, Tobacco abuse (4/27/2017), Tonsillitis, 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    SURGICAL HISTORY   has a past surgical history that includes stent placement (2007); tubal ligation (1983); hysterectomy laparoscopy (1988); appendectomy; vein stripping; and tonsillectomy.    SOCIAL HISTORY  Social History     Tobacco Use    Smoking status: Former Smoker     Packs/day: 0.25     Years: 60.00     Pack years: 15.00     Types: Cigarettes    Smokeless tobacco: Never Used   Substance Use Topics    Alcohol use: No    Drug use: No      Social History     Substance and Sexual Activity   Drug Use No       FAMILY HISTORY  Family History   Problem Relation Age of Onset    Cancer Mother         rectal    Arthritis Father     Alcohol/Drug Father     Heart Disease Father     Diabetes Brother     Cancer Maternal Aunt         colon       CURRENT MEDICATIONS  Home Medications       Reviewed by Topher Millard, PhT (Pharmacy Tech) on 07/15/21 at 2043  Med List Status: Complete     Medication Last Dose Status   clopidogrel (PLAVIX) 75 MG Tab 7/15/2021 Active   ferrous sulfate (FE TABS) 325 (65 Fe) MG EC tablet 7/11/2021 Active   FLUoxetine (PROZAC) 20 MG Cap 7/15/2021 Active   glipiZIDE (GLUCOTROL) 10 MG Tab 7/15/2021 Active   meloxicam (MOBIC) 7.5 MG Tab 7/14/2021 Active   metformin (GLUCOPHAGE) 1000 MG tablet 7/15/2021 Active   metoprolol (LOPRESSOR) 25 MG Tab 7/15/2021 Active   nitroglycerin (NITROSTAT) 0.4 MG SL Tab PRN Active   NON SPECIFIED supply Active   pioglitazone (ACTOS) 45 MG Tab 7/15/2021 Active   simvastatin (ZOCOR) 20 MG Tab 7/14/2021 Active                    ALLERGIES  Allergies   Allergen Reactions    Aldactone [Kdc:Yellow Dye+Spironolactone] Palpitations    Percocet [Apap-Fd&C Red  "#40 [place]-Oxycodone] Rash     Full body       PHYSICAL EXAM  VITAL SIGNS: /53   Pulse 68   Temp 37.1 °C (98.7 °F) (Temporal)   Resp 19   Ht 1.676 m (5' 6\")   Wt 74.8 kg (165 lb)   SpO2 95%   BMI 26.63 kg/m²      Constitutional: Alert in no apparent distress.  HENT: No signs of trauma, Bilateral external ears normal, Nose normal.   Eyes: Pupils are equal and reactive, Conjunctiva normal, Non-icteric. Very pale underneath eyes  Neck: Normal range of motion, No tenderness, Supple, No stridor.   Lymphatic: No lymphadenopathy noted.   Cardiovascular: Regular rate and rhythm, no palpable thrill  Thorax & Lungs: No respiratory distress,  No chest tenderness.   Abdomen: Bowel sounds normal, Soft, No tenderness, No masses, No pulsatile masses. No peritoneal signs.  Rectal: GUAIAC positive stool sample   Skin: Warm, Dry, No erythema, No rash.   Back: No bony tenderness, No CVA tenderness.   Extremities: Intact distal pulses, No edema, No tenderness, No cyanosis.  Musculoskeletal: Good range of motion in all major joints. No tenderness to palpation or major deformities noted.   Neurologic: Alert , Normal motor function, Normal sensory function, No focal deficits noted.   Psychiatric: Affect normal, Judgment normal, Mood normal.     DIAGNOSTIC STUDIES / PROCEDURES    EKG Interpretation:  Interpreted by me    12 Lead EKG interpreted by me to show:  Normal sinus rhythm  Rate 71  Axis: Normal  Intervals: Normal  Normal T waves, no inversion  Normal ST segments, no elevation or depression  My impression of this EKG: Does not indicate ischemia or arrhythmia at this time.     LABS  Labs Reviewed   CBC WITH DIFFERENTIAL - Abnormal; Notable for the following components:       Result Value    RBC 2.24 (*)     Hemoglobin 7.0 (*)     Hematocrit 23.3 (*)     .0 (*)     MCHC 30.0 (*)     RDW 65.3 (*)     Lymphocytes 20.60 (*)     Immature Granulocytes 1.90 (*)     Neutrophils (Absolute) 7.19 (*)     Immature " Granulocytes (abs) 0.19 (*)     All other components within normal limits    Narrative:     Indicate which anticoagulants the patient is on:->UNKNOWN   COMP METABOLIC PANEL - Abnormal; Notable for the following components:    Glucose 246 (*)     Bun 39 (*)     Calcium 8.3 (*)     Total Protein 5.9 (*)     All other components within normal limits    Narrative:     Indicate which anticoagulants the patient is on:->UNKNOWN   PROBRAIN NATRIURETIC PEPTIDE, NT - Abnormal; Notable for the following components:    NT-proBNP 1287 (*)     All other components within normal limits    Narrative:     Indicate which anticoagulants the patient is on:->UNKNOWN   CBC WITH DIFFERENTIAL - Abnormal; Notable for the following components:    RBC 2.04 (*)     Hemoglobin 6.5 (*)     Hematocrit 21.3 (*)     .4 (*)     MCHC 30.5 (*)     RDW 64.8 (*)     Immature Granulocytes 1.50 (*)     Immature Granulocytes (abs) 0.12 (*)     All other components within normal limits    Narrative:     Indicate which anticoagulants the patient is on:->UNKNOWN   COMP METABOLIC PANEL - Abnormal; Notable for the following components:    Glucose 189 (*)     Bun 35 (*)     Calcium 8.0 (*)     Total Protein 5.4 (*)     All other components within normal limits    Narrative:     Indicate which anticoagulants the patient is on:->UNKNOWN   HEMOGLOBIN A1C - Abnormal; Notable for the following components:    Glycohemoglobin 7.2 (*)     All other components within normal limits    Narrative:     Indicate which anticoagulants the patient is on:->UNKNOWN   POCT GLUCOSE DEVICE RESULTS - Abnormal; Notable for the following components:    Glucose - Accu-Ck 270 (*)     All other components within normal limits   LIPASE    Narrative:     Indicate which anticoagulants the patient is on:->UNKNOWN   TROPONIN    Narrative:     Indicate which anticoagulants the patient is on:->UNKNOWN   APTT    Narrative:     Indicate which anticoagulants the patient is on:->UNKNOWN    PROTHROMBIN TIME    Narrative:     Indicate which anticoagulants the patient is on:->UNKNOWN   HCG QUAL SERUM    Narrative:     Indicate which anticoagulants the patient is on:->UNKNOWN   ESTIMATED GFR    Narrative:     Indicate which anticoagulants the patient is on:->UNKNOWN   PERIPHERAL SMEAR REVIEW    Narrative:     Indicate which anticoagulants the patient is on:->UNKNOWN   PLATELET ESTIMATE    Narrative:     Indicate which anticoagulants the patient is on:->UNKNOWN   MORPHOLOGY    Narrative:     Indicate which anticoagulants the patient is on:->UNKNOWN   DIFFERENTIAL COMMENT    Narrative:     Indicate which anticoagulants the patient is on:->UNKNOWN   COD (ADULT)   ABO RH CONFIRM   ESTIMATED GFR    Narrative:     Indicate which anticoagulants the patient is on:->UNKNOWN   PLATELET MAPPING WITH BASIC TEG   CBC WITH DIFFERENTIAL   COMP METABOLIC PANEL   HGB   RELEASE RED BLOOD CELLS-ACTIVE BLEEDING   TRANSFUSE RBC ACTIVE BLEED-NURSING COMMUNICATION     All labs reviewed by me.    RADIOLOGY  DX-CHEST-PORTABLE (1 VIEW)   Final Result         1. No acute cardiopulmonary abnormalities are identified.      CT-CTA COMPLETE THORACOABDOMINAL AORTA   Final Result      1.  No evidence of abdominal aortic aneurysm. Ectatic abdominal aorta.   2.  Significant stenosis of the proximal left subclavian artery.   3.  Significant stenosis of the left renal artery ostium with slight hypoenhancement of the left kidney.   4.  Bilateral renal cysts.   5.  Colonic diverticulosis.   6.  Cholelithiasis.   7.  Cardiomegaly.   8.  Coronary artery calcification.   9.  Mild loss of height of T1, T2, T8 and T11 which is of indeterminate age but may be chronic.                              The radiologist's interpretation of all radiological studies have been reviewed by me.    COURSE & MEDICAL DECISION MAKING  Pertinent Labs & Imaging studies reviewed. (See chart for details)    This is a 75 y.o. female that presents with what appears to  be a GI bleed at this time the patient has unequal blood pressures therefore we will get a CTA to evaluate for aortic injury.  We will get coagulation studies as well as a screening EKG and evaluate the patient for liver as well as electrolyte abnormalities..     6:07 PM - Patient seen and examined at bedside. Ordered EKG, CBC with differential, CMP, Lipase, APTT, and PT/INR. Performed rectal swab with chaperone present.     6:20 PM - Nurse informed me of unequal blood pressures in upper extremities. This is consistent even after multiple assessments. Patient upgraded to code aorta. Ordered DX-chest, CT-CTA complete thoracoabdominal aorta, troponin, and BNP.    8:00 PM - Paged Hospitalist.    8:04 PM - I discussed the patient's case and the above findings with Dr. Ramos (Hospitalist) who agrees to evaluate the patient for hospitalization.    8:33 PM - I discussed the patient's case and the above findings with Dr. Mullins (GI) who agrees with the plan of care.    Patient was found to have no acute abnormalities on the CTA.  There is significant stenosis of the left renal artery ostium however this is likely chronic.  There are also chronic other changes in her thoracic vertebrae but she has no back pain.  Chest x-ray is negative.  There is no coagulopathy.  The patient does have significant low hemoglobin.  We will give the patient fluids at this time.  Patient's hemoglobin is 7.  I will not give the patient blood and will recheck hemoglobin and will defer to hospitalist.  We will give the patient a PPI and GI was consulted.    The total critical care time on this patient is 40 minutes, resuscitating patient, speaking with admitting physician, and deciphering test results. This 40 minutes is exclusive of separately billable procedures.        DISPOSITION:  Patient will be hospitalized by Dr. Ramos in guarded condition.    FINAL IMPRESSION  1. Gastrointestinal hemorrhage, unspecified gastrointestinal hemorrhage type     2. Anemia, unspecified type          I, Benton Abbott (Jo), am scribing for, and in the presence of, Sebas Hernandez M.D..    Electronically signed by: Benton Abbott (Jo), 7/15/2021    ISebas M.D. personally performed the services described in this documentation, as scribed by Benton Abbott in my presence, and it is both accurate and complete.     The note accurately reflects work and decisions made by me.  Sebas Hernandez M.D.  7/16/2021  1:14 AM

## 2021-07-17 ENCOUNTER — ANESTHESIA (OUTPATIENT)
Dept: SURGERY | Facility: MEDICAL CENTER | Age: 76
DRG: 378 | End: 2021-07-17
Payer: MEDICARE

## 2021-07-17 ENCOUNTER — ANESTHESIA EVENT (OUTPATIENT)
Dept: SURGERY | Facility: MEDICAL CENTER | Age: 76
DRG: 378 | End: 2021-07-17
Payer: MEDICARE

## 2021-07-17 PROBLEM — K55.21 AVM (ARTERIOVENOUS MALFORMATION) OF COLON WITH HEMORRHAGE: Status: ACTIVE | Noted: 2017-06-01

## 2021-07-17 LAB
GLUCOSE BLD-MCNC: 179 MG/DL (ref 65–99)
GLUCOSE BLD-MCNC: 223 MG/DL (ref 65–99)
GLUCOSE BLD-MCNC: 237 MG/DL (ref 65–99)
GLUCOSE BLD-MCNC: 264 MG/DL (ref 65–99)
HGB BLD-MCNC: 7.4 G/DL (ref 12–16)
HGB BLD-MCNC: 7.9 G/DL (ref 12–16)

## 2021-07-17 PROCEDURE — A9270 NON-COVERED ITEM OR SERVICE: HCPCS | Performed by: INTERNAL MEDICINE

## 2021-07-17 PROCEDURE — A9270 NON-COVERED ITEM OR SERVICE: HCPCS | Performed by: STUDENT IN AN ORGANIZED HEALTH CARE EDUCATION/TRAINING PROGRAM

## 2021-07-17 PROCEDURE — 85018 HEMOGLOBIN: CPT

## 2021-07-17 PROCEDURE — 36415 COLL VENOUS BLD VENIPUNCTURE: CPT

## 2021-07-17 PROCEDURE — 160035 HCHG PACU - 1ST 60 MINS PHASE I: Performed by: INTERNAL MEDICINE

## 2021-07-17 PROCEDURE — 160048 HCHG OR STATISTICAL LEVEL 1-5: Performed by: INTERNAL MEDICINE

## 2021-07-17 PROCEDURE — 700102 HCHG RX REV CODE 250 W/ 637 OVERRIDE(OP): Performed by: INTERNAL MEDICINE

## 2021-07-17 PROCEDURE — 700105 HCHG RX REV CODE 258: Performed by: ANESTHESIOLOGY

## 2021-07-17 PROCEDURE — 82962 GLUCOSE BLOOD TEST: CPT | Mod: 91

## 2021-07-17 PROCEDURE — 160203 HCHG ENDO MINUTES - 1ST 30 MINS LEVEL 4: Performed by: INTERNAL MEDICINE

## 2021-07-17 PROCEDURE — 0W3P8ZZ CONTROL BLEEDING IN GASTROINTESTINAL TRACT, VIA NATURAL OR ARTIFICIAL OPENING ENDOSCOPIC: ICD-10-PCS | Performed by: INTERNAL MEDICINE

## 2021-07-17 PROCEDURE — 160009 HCHG ANES TIME/MIN: Performed by: INTERNAL MEDICINE

## 2021-07-17 PROCEDURE — 770020 HCHG ROOM/CARE - TELE (206)

## 2021-07-17 PROCEDURE — 700105 HCHG RX REV CODE 258: Performed by: INTERNAL MEDICINE

## 2021-07-17 PROCEDURE — 700102 HCHG RX REV CODE 250 W/ 637 OVERRIDE(OP): Performed by: STUDENT IN AN ORGANIZED HEALTH CARE EDUCATION/TRAINING PROGRAM

## 2021-07-17 PROCEDURE — 160002 HCHG RECOVERY MINUTES (STAT): Performed by: INTERNAL MEDICINE

## 2021-07-17 PROCEDURE — 700111 HCHG RX REV CODE 636 W/ 250 OVERRIDE (IP): Performed by: ANESTHESIOLOGY

## 2021-07-17 PROCEDURE — 99232 SBSQ HOSP IP/OBS MODERATE 35: CPT | Performed by: HOSPITALIST

## 2021-07-17 PROCEDURE — 3E0H8GC INTRODUCTION OF OTHER THERAPEUTIC SUBSTANCE INTO LOWER GI, VIA NATURAL OR ARTIFICIAL OPENING ENDOSCOPIC: ICD-10-PCS | Performed by: INTERNAL MEDICINE

## 2021-07-17 PROCEDURE — 160208 HCHG ENDO MINUTES - EA ADDL 1 MIN LEVEL 4: Performed by: INTERNAL MEDICINE

## 2021-07-17 RX ORDER — SODIUM CHLORIDE, SODIUM LACTATE, POTASSIUM CHLORIDE, CALCIUM CHLORIDE 600; 310; 30; 20 MG/100ML; MG/100ML; MG/100ML; MG/100ML
INJECTION, SOLUTION INTRAVENOUS CONTINUOUS
Status: DISCONTINUED | OUTPATIENT
Start: 2021-07-17 | End: 2021-07-17 | Stop reason: HOSPADM

## 2021-07-17 RX ORDER — SODIUM CHLORIDE, SODIUM LACTATE, POTASSIUM CHLORIDE, CALCIUM CHLORIDE 600; 310; 30; 20 MG/100ML; MG/100ML; MG/100ML; MG/100ML
INJECTION, SOLUTION INTRAVENOUS
Status: DISCONTINUED | OUTPATIENT
Start: 2021-07-17 | End: 2021-07-17 | Stop reason: SURG

## 2021-07-17 RX ORDER — ONDANSETRON 2 MG/ML
4 INJECTION INTRAMUSCULAR; INTRAVENOUS
Status: DISCONTINUED | OUTPATIENT
Start: 2021-07-17 | End: 2021-07-17 | Stop reason: HOSPADM

## 2021-07-17 RX ORDER — MIDAZOLAM HYDROCHLORIDE 1 MG/ML
INJECTION INTRAMUSCULAR; INTRAVENOUS PRN
Status: DISCONTINUED | OUTPATIENT
Start: 2021-07-17 | End: 2021-07-17 | Stop reason: SURG

## 2021-07-17 RX ORDER — PHENYLEPHRINE HCL IN 0.9% NACL 0.5 MG/5ML
SYRINGE (ML) INTRAVENOUS PRN
Status: DISCONTINUED | OUTPATIENT
Start: 2021-07-17 | End: 2021-07-17 | Stop reason: SURG

## 2021-07-17 RX ORDER — HYDROCORTISONE ACETATE 25 MG/1
25 SUPPOSITORY RECTAL EVERY 12 HOURS
Status: DISCONTINUED | OUTPATIENT
Start: 2021-07-17 | End: 2021-07-18 | Stop reason: HOSPADM

## 2021-07-17 RX ORDER — DIPHENHYDRAMINE HYDROCHLORIDE 50 MG/ML
12.5 INJECTION INTRAMUSCULAR; INTRAVENOUS
Status: DISCONTINUED | OUTPATIENT
Start: 2021-07-17 | End: 2021-07-17 | Stop reason: HOSPADM

## 2021-07-17 RX ORDER — HALOPERIDOL 5 MG/ML
1 INJECTION INTRAMUSCULAR
Status: DISCONTINUED | OUTPATIENT
Start: 2021-07-17 | End: 2021-07-17 | Stop reason: HOSPADM

## 2021-07-17 RX ADMIN — PROPOFOL 50 MG: 10 INJECTION, EMULSION INTRAVENOUS at 09:05

## 2021-07-17 RX ADMIN — Medication 200 MCG: at 09:15

## 2021-07-17 RX ADMIN — INSULIN HUMAN 2 UNITS: 100 INJECTION, SOLUTION PARENTERAL at 18:15

## 2021-07-17 RX ADMIN — PROPOFOL 50 MG: 10 INJECTION, EMULSION INTRAVENOUS at 09:21

## 2021-07-17 RX ADMIN — MIDAZOLAM HYDROCHLORIDE 4 MG: 1 INJECTION, SOLUTION INTRAMUSCULAR; INTRAVENOUS at 08:54

## 2021-07-17 RX ADMIN — POLYETHYLENE GLYCOL 3350, SODIUM SULFATE, SODIUM CHLORIDE, POTASSIUM CHLORIDE, ASCORBIC ACID, SODIUM ASCORBATE 100 G: KIT at 03:00

## 2021-07-17 RX ADMIN — INSULIN HUMAN 3 UNITS: 100 INJECTION, SOLUTION PARENTERAL at 13:45

## 2021-07-17 RX ADMIN — SODIUM CHLORIDE: 9 INJECTION, SOLUTION INTRAVENOUS at 05:00

## 2021-07-17 RX ADMIN — METOPROLOL TARTRATE 25 MG: 25 TABLET, FILM COATED ORAL at 05:00

## 2021-07-17 RX ADMIN — INSULIN HUMAN 2 UNITS: 100 INJECTION, SOLUTION PARENTERAL at 01:18

## 2021-07-17 RX ADMIN — HYDROCORTISONE ACETATE 25 MG: 25 SUPPOSITORY RECTAL at 12:40

## 2021-07-17 RX ADMIN — METOPROLOL TARTRATE 25 MG: 25 TABLET, FILM COATED ORAL at 17:45

## 2021-07-17 RX ADMIN — SODIUM CHLORIDE, POTASSIUM CHLORIDE, SODIUM LACTATE AND CALCIUM CHLORIDE: 600; 310; 30; 20 INJECTION, SOLUTION INTRAVENOUS at 08:40

## 2021-07-17 RX ADMIN — SIMVASTATIN 20 MG: 20 TABLET, FILM COATED ORAL at 17:45

## 2021-07-17 RX ADMIN — INSULIN HUMAN 1 UNITS: 100 INJECTION, SOLUTION PARENTERAL at 05:07

## 2021-07-17 RX ADMIN — FLUOXETINE HYDROCHLORIDE 20 MG: 20 CAPSULE ORAL at 05:00

## 2021-07-17 ASSESSMENT — COGNITIVE AND FUNCTIONAL STATUS - GENERAL
MOVING TO AND FROM BED TO CHAIR: A LITTLE
CLIMB 3 TO 5 STEPS WITH RAILING: A LITTLE
MOBILITY SCORE: 18
HELP NEEDED FOR BATHING: A LITTLE
TURNING FROM BACK TO SIDE WHILE IN FLAT BAD: A LITTLE
WALKING IN HOSPITAL ROOM: A LITTLE
SUGGESTED CMS G CODE MODIFIER DAILY ACTIVITY: CI
STANDING UP FROM CHAIR USING ARMS: A LITTLE
MOVING FROM LYING ON BACK TO SITTING ON SIDE OF FLAT BED: A LITTLE
DAILY ACTIVITIY SCORE: 23
SUGGESTED CMS G CODE MODIFIER MOBILITY: CK

## 2021-07-17 ASSESSMENT — PAIN DESCRIPTION - PAIN TYPE: TYPE: SURGICAL PAIN

## 2021-07-17 ASSESSMENT — PAIN SCALES - GENERAL: PAIN_LEVEL: 0

## 2021-07-17 ASSESSMENT — FIBROSIS 4 INDEX: FIB4 SCORE: 0.95

## 2021-07-17 NOTE — PROGRESS NOTES
Assumed care of pt, received bedside report from ALEXANDER Arriola. Pt sitting up in bed, no complaints of pain, 2L O2 nasal cannula, A/Ox4. Fall and safety precautions in place. Discussed POC with pt, pt verbalizes understanding. No further needs at this time.

## 2021-07-17 NOTE — PROGRESS NOTES
Patient is AO x 4, RASS -1, and denies pain / nausea.  Tolerating sips of PO clears.  VSS on 2L NC, RR 19.  POC reviewed, PIVs verified, and safety protocols in place.  Report to Meek MUNOZ T8.  Transported with cardiac monitoring in place and full tank of O2 by this RN.

## 2021-07-17 NOTE — ANESTHESIA POSTPROCEDURE EVALUATION
Patient: Kimberly Castañeda    Procedure Summary     Date: 07/17/21 Room / Location: [facility] OR 06 / SURGERY [facility]    Anesthesia Start: 0850 Anesthesia Stop: 0935    Procedures:       COLONOSCOPY (N/A Anus)      COLONOSCOPY, WITH SCLEROTHERAPY (N/A Anus)      COLONOSCOPY, WITH CLIP APPLICATION (N/A Anus)      COLONOSCOPY, WITH ARGON PLASMA COAGULATION (N/A Anus) Diagnosis: (gi bleed)    Surgeons: Oh Ochoa M.D. Responsible Provider: Lan Jenkins M.D.    Anesthesia Type: MAC ASA Status: 3          Final Anesthesia Type: MAC  Last vitals  BP   Blood Pressure : (!) 99/57 (RN notified)    Temp   36.4 °C (97.6 °F)    Pulse   79   Resp   18    SpO2   98 %      Anesthesia Post Evaluation    Patient location during evaluation: PACU  Patient participation: complete - patient participated  Level of consciousness: awake and alert  Pain score: 0    Airway patency: patent  Anesthetic complications: no  Cardiovascular status: hemodynamically stable  Respiratory status: acceptable  Hydration status: euvolemic    PONV: none          There were no known complications for this encounter.     Nurse Pain Score: 0 (NPRS)

## 2021-07-17 NOTE — PROGRESS NOTES
Pt transported to [facility] Pre-op with ACLS RN on zoll, VSS, RN given report.

## 2021-07-17 NOTE — PROCEDURES
DATE OF PROCEDURE:  07/17/2021     TITLE:  Procedure note.     PROCEDURES PERFORMED:  Colonoscopy with hemostasis.     Colonoscopy with argon plasma coagulation of arteriovenous malformation.     Colonoscopy with epinephrine injection, and Hemoclip placement.     INSTRUMENT UTILIZED:  Olympus pediatric variable stiffness flexible forward   viewing colonoscope.     INDICATIONS:  The patient with melena and negative EGD.     CONSENT:  Full RBA discussion held prior to the procedure and a signed   witnessed consent form placed on the chart.     SEDATION:  Provided by Dr. Jenkins of anesthesiology.     TOLERANCE:  Excellent.     PATHOLOGY SPECIMENS:  None.     LAXATIVE PREPARATION:  MoviPrep.     QUALITY OF PREPARATION:  Poor.     COLONOSCOPE WITHDRAWAL TIME:  15 minutes.     PROCEDURAL DETAIL:  After adequate sedation, the procedure was begun.  The   patient had perianal erythema and small external hemorrhoids.  Digital rectal   examination revealed normal resting anal sphincter tone and no palpable rectal   masses.  The instrument was advanced per the anal canal into the rectal   vault.  There was immediately noted to be copious green to black fluid and   solid stool.  I carefully advanced the instrument to the region of the cecum,   which was identified by the ileocecal valve and appendiceal orifice.  In the   right colon, there was noted to be green fluid flowing per the ileocecal valve   into the cecum.  There was noted to be a stricture immediately distal to the   cecum with a small approximately 4 mm ulceration at the 6 o'clock position,   which was oozing.  I injected epinephrine (1:10,000), total of 3 mL into the   region with good hemostasis.  I then placed a single Hemoclip with successful   hemostasis.  Within the cecal cap, there was noted to be a 10 mm nonbleeding   arteriovenous malformation.  I utilized argon plasma coagulation set for the   right colon to ablate this.  Upon withdrawal of the  colonoscope, I attempted   to inspect the colonic walls to the best of my abilities.  Again, this was   limited secondary to poor quality of laxative preparation.  In the sigmoid   colon, there was diverticulosis, severe without evidence of diverticulitis.    Once in the rectum, retroflexion was completed, which revealed grade III   nonbleeding internal hemorrhoids.     No immediate complications.     IMPRESSIONS AND FINDINGS:  1.  Right-sided colonic stricture with bleeding ulceration.  I suspect this is   ischemic in nature.  2.  Arteriovenous malformation -- status post ablation.  3.  Internal hemorrhoids -- possible contribution to hematochezia.  4.  Diverticular disease of the left colon.  No evidence of diverticulitis or   active bleeding.  5.  Limited examination due to poor quality of preparation, nonetheless it was   completed through to the cecum.     PLAN AND RECOMMENDATIONS:  1.  Observe for any adverse events from this procedure.  2.  Clear liquid diet today, avoiding red liquids.  3.  Monitor hemoglobin and hematocrit and monitor for evidence of overt GI   blood loss.  I do suspect the patient will have some ongoing melena as she   washes out the blood from her gastrointestinal tract.  4.  Hydrocortisone 25 mg suppository inserted twice daily for the next 2 weeks   for hemorrhoids.  5.  Anticipate starting Metamucil once the patient is taken a solid diet.        ______________________________  MD ELIJAH DEWITT/BRENT    DD:  07/17/2021 09:47  DT:  07/17/2021 10:03    Job#:  [id number]    CC:MD SALVADOR Erickson MD

## 2021-07-17 NOTE — ANESTHESIA PREPROCEDURE EVALUATION
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    Pertinent diagnostic labs and testing reviewed    Informed Consent:    Anesthetic plan and risks discussed with patient.    Use of blood products discussed with: patient whom consented to blood products.

## 2021-07-17 NOTE — ANESTHESIA TIME REPORT
Anesthesia Start and Stop Event Times     Date Time Event    7/17/2021 0846 Ready for Procedure     0850 Anesthesia Start     0935 Anesthesia Stop        Responsible Staff  07/17/21    Name Role Begin End    Lan Jenkins M.D. Anesth 0850 0935        Preop Diagnosis (Free Text):  Pre-op Diagnosis     gi bleed        Preop Diagnosis (Codes):    Post op Diagnosis  GI bleed      Premium Reason  E. Weekend    Comments:

## 2021-07-17 NOTE — OR SURGEON
Immediate Post OP Note    PreOp Diagnosis: melena      PostOp Diagnosis: melena, colonic ulceration- bleeding, colonic stricture, colonic AVM- ablated.      Procedure(s):  COLONOSCOPY - Wound Class: Clean Contaminated  COLONOSCOPY, WITH SCLEROTHERAPY - Wound Class: Clean Contaminated  COLONOSCOPY, WITH CLIP APPLICATION - Wound Class: Clean Contaminated  COLONOSCOPY, WITH ARGON PLASMA COAGULATION - Wound Class: Clean Contaminated    Surgeon(s):  Oh Ochoa M.D.    Anesthesiologist/Type of Anesthesia:  Anesthesiologist: Lan Jenkins M.D./General    Surgical Staff:  Endoscopy Technician: Lc Diop  Endoscopy Nurse: Juan Gonzalez R.N.    Specimens removed if any:  * No specimens in log *    Estimated Blood Loss: < 5 cc    Findings: see detailed dictated note    Complications: no immediate.        7/17/2021 9:34 AM Oh Ochoa M.D.

## 2021-07-18 VITALS
HEART RATE: 67 BPM | TEMPERATURE: 98.2 F | RESPIRATION RATE: 17 BRPM | OXYGEN SATURATION: 97 % | WEIGHT: 158.51 LBS | HEIGHT: 66 IN | BODY MASS INDEX: 25.47 KG/M2 | SYSTOLIC BLOOD PRESSURE: 116 MMHG | DIASTOLIC BLOOD PRESSURE: 58 MMHG

## 2021-07-18 PROBLEM — K55.21 AVM (ARTERIOVENOUS MALFORMATION) OF COLON WITH HEMORRHAGE: Status: RESOLVED | Noted: 2017-06-01 | Resolved: 2021-07-18

## 2021-07-18 LAB
ERYTHROCYTE [DISTWIDTH] IN BLOOD BY AUTOMATED COUNT: 68 FL (ref 35.9–50)
GLUCOSE BLD-MCNC: 161 MG/DL (ref 65–99)
GLUCOSE BLD-MCNC: 182 MG/DL (ref 65–99)
GLUCOSE BLD-MCNC: 270 MG/DL (ref 65–99)
HCT VFR BLD AUTO: 26.2 % (ref 37–47)
HGB BLD-MCNC: 7.8 G/DL (ref 12–16)
MCH RBC QN AUTO: 29.9 PG (ref 27–33)
MCHC RBC AUTO-ENTMCNC: 29.8 G/DL (ref 33.6–35)
MCV RBC AUTO: 100.4 FL (ref 81.4–97.8)
PLATELET # BLD AUTO: 198 K/UL (ref 164–446)
PMV BLD AUTO: 9.3 FL (ref 9–12.9)
RBC # BLD AUTO: 2.61 M/UL (ref 4.2–5.4)
WBC # BLD AUTO: 4.5 K/UL (ref 4.8–10.8)

## 2021-07-18 PROCEDURE — 99239 HOSP IP/OBS DSCHRG MGMT >30: CPT | Performed by: HOSPITALIST

## 2021-07-18 PROCEDURE — 36415 COLL VENOUS BLD VENIPUNCTURE: CPT

## 2021-07-18 PROCEDURE — 700102 HCHG RX REV CODE 250 W/ 637 OVERRIDE(OP): Performed by: INTERNAL MEDICINE

## 2021-07-18 PROCEDURE — 85027 COMPLETE CBC AUTOMATED: CPT

## 2021-07-18 PROCEDURE — A9270 NON-COVERED ITEM OR SERVICE: HCPCS | Performed by: INTERNAL MEDICINE

## 2021-07-18 PROCEDURE — 82962 GLUCOSE BLOOD TEST: CPT | Mod: 91

## 2021-07-18 RX ORDER — ASPIRIN 81 MG/1
81 TABLET, CHEWABLE ORAL DAILY
Qty: 100 TABLET | Refills: 0 | Status: SHIPPED | OUTPATIENT
Start: 2021-07-18

## 2021-07-18 RX ORDER — HYDROCORTISONE ACETATE 25 MG/1
25 SUPPOSITORY RECTAL EVERY 12 HOURS
Qty: 28 SUPPOSITORY | Refills: 0 | Status: SHIPPED | OUTPATIENT
Start: 2021-07-18 | End: 2021-08-01

## 2021-07-18 RX ADMIN — INSULIN HUMAN 3 UNITS: 100 INJECTION, SOLUTION PARENTERAL at 12:07

## 2021-07-18 RX ADMIN — INSULIN HUMAN 1 UNITS: 100 INJECTION, SOLUTION PARENTERAL at 01:44

## 2021-07-18 RX ADMIN — METOPROLOL TARTRATE 25 MG: 25 TABLET, FILM COATED ORAL at 05:48

## 2021-07-18 RX ADMIN — FLUOXETINE HYDROCHLORIDE 20 MG: 20 CAPSULE ORAL at 05:48

## 2021-07-18 RX ADMIN — INSULIN HUMAN 1 UNITS: 100 INJECTION, SOLUTION PARENTERAL at 05:51

## 2021-07-18 RX ADMIN — HYDROCORTISONE ACETATE 25 MG: 25 SUPPOSITORY RECTAL at 05:48

## 2021-07-18 ASSESSMENT — PAIN DESCRIPTION - PAIN TYPE: TYPE: SURGICAL PAIN

## 2021-07-18 NOTE — DISCHARGE SUMMARY
"Discharge Summary    CHIEF COMPLAINT ON ADMISSION  Chief Complaint   Patient presents with   • Weakness     Pt BIB EMS for complaints of weakness onset 7/11/21.   • Bloody Stools     Pt complains black tary stools x 7/11/21 onset 7/11/21. Pt denies emesis or pain.       Reason for Admission  EMS     Admission Date  7/15/2021    CODE STATUS  Full Code    HPI & HOSPITAL COURSE  For full details of admission please see the H&P of Dr. Ramos dated 7/15/2021, briefly, \"This is a 75 y.o. female with PMHX of CAD, HTN, DM who presented 7/15/2021 with Bloody stool. As per patient she was having dark tarry stools with some bright red blood that started on 7/11/21. Since then she has had 1-2 episodes of dark stool daily, with the last one occurring this morning. She has not had any vomiting, abdominal pain, or dysuria  Here in ER her hemoglobin is 7 and her guaiac is positive   I have ordered for 1 unit of RBC and patient has been started on pantoprazole drip   .GI Dr. Mullins has been consulted by ERP\"    Patient was admitted for the above issues, underwent EGD on hospital day 1.  This was largely unremarkable, underwent colonoscopy on hospital day 2.  Findings per below.  She was permitted to clear liquid diet on hospital day 2, this was advanced to GI soft on hospital day 3 which was well-tolerated.  She was subsequently deemed stable for discharge with stable serial hemograms.  She was provided with return precautions.    Therefore, she is discharged in good and stable condition to home with close outpatient follow-up.    The patient met 2-midnight criteria for an inpatient stay at the time of discharge.    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  No follow-up provider specified.    MEDICATIONS ON DISCHARGE     Medication List      START taking these medications      Instructions   aspirin 81 MG Chew chewable tablet  Commonly known as: ASA   Chew 1 tablet every day.  Dose: 81 mg     hydrocortisone 25 MG Supp  Commonly known as: ANUSOL-HC   Insert 1 Suppository into the rectum every 12 hours for 14 days.  Dose: 25 mg        CONTINUE taking these medications      Instructions   ferrous sulfate 325 (65 Fe) MG EC tablet  Commonly known as: Fe Tabs   Take 1 Tab by mouth 3 times a day, with meals.  Dose: 325 mg     FLUoxetine 20 MG Caps  Commonly known as: PROZAC   Take 1 Cap by mouth every day.  Dose: 20 mg     glipiZIDE 10 MG Tabs  Commonly known as: GLUCOTROL   Take 10 mg by mouth 2 times a day.  Dose: 10 mg     meloxicam 7.5 MG Tabs  Commonly known as: MOBIC   Take 7.5 mg by mouth at bedtime.  Dose: 7.5 mg     metformin 1000 MG tablet  Commonly known as: GLUCOPHAGE   Take 1,000-1,500 mg by mouth 2 times a day with meals. 1 tablet = 1000 mg every morning  1 1/2 tablet = 1500 mg every evening  Dose: 1,000-1,500 mg     metoprolol tartrate 25 MG Tabs  Commonly known as: LOPRESSOR   Take 1 Tab by mouth 2 times a day.  Dose: 25 mg     nitroglycerin 0.4 MG Subl  Commonly known as: NITROSTAT   Place 1 Tab under tongue as needed for Chest Pain.  Dose: 0.4 mg     NON SPECIFIED   Portable Oxygen  Dx: COPD     pioglitazone 45 MG Tabs  Commonly known as: ACTOS   Take 45 mg by mouth every morning.  Dose: 45 mg     simvastatin 20 MG Tabs  Commonly known as: ZOCOR   Take 1 Tab by mouth every evening.  Dose: 20 mg        STOP taking these medications    clopidogrel 75 MG Tabs  Commonly known as: PLAVIX            Allergies  Allergies   Allergen Reactions   • Aldactone [Kdc:Yellow Dye+Spironolactone] Palpitations   • Percocet [Apap-Fd&C Red #40 Al  [place]-Oxycodone] Rash     Full body       DIET  Orders Placed This Encounter   Procedures   • Diet Order Diet: Consistent CHO (Diabetic)     Standing Status:   Standing     Number of Occurrences:   1     Order Specific Question:   Diet:     Answer:   Consistent CHO (Diabetic) [4]   • Discontinue Diet Tray     Standing Status:   Standing     Number of Occurrences:   1       ACTIVITY  As tolerated.  Weight bearing as tolerated    CONSULTATIONS  Gastroenterology, Dr. Mullins    PROCEDURES  1.  EGD w/ Dr. Mane 7/16/21  Findings/Impressions:   Esophagus: Normal mucosa  Stomach: Normal mucosa.  Gastric biopsies taken to rule out H. Pylori.  No fresh or old blood.  Duodenum: Normal mucosa.  No fresh or old blood    2. [place] w/ Dr. Ochoa 7/17/21   IMPRESSIONS AND FINDINGS:  1.  Right-sided colonic stricture with bleeding ulceration.  I suspect this is  ischemic in nature.  2.  Arteriovenous malformation -- status post ablation.  3.  Internal hemorrhoids -- possible contribution to hematochezia.  4.  Diverticular disease of the left colon.  No evidence of diverticulitis or active bleeding.  5.  Limited examination due to poor quality of preparation, nonetheless it was completed through to the cecum.      LABORATORY  Lab Results   Component Value Date    SODIUM 139 07/16/2021    POTASSIUM 4.8 07/16/2021    CHLORIDE 105 07/16/2021    CO2 29 07/16/2021    GLUCOSE 174 (H) 07/16/2021    BUN 29 (H) 07/16/2021    CREATININE 0.65 07/16/2021        Lab Results   Component Value Date    WBC 4.5 (L) 07/18/2021    HEMOGLOBIN 7.8 (L) 07/18/2021    HEMATOCRIT 26.2 (L) 07/18/2021    PLATELETCT 198 07/18/2021        Total time of the discharge process exceeds 32 minutes.

## 2021-07-18 NOTE — PROGRESS NOTES
Gastroenterology Progress Note     Author: Kristinasterling Gonzalez MYNOR    Date & Time Created: 7/18/2021 10:57 AM    Chief Complaint:  Melena    Interval History:  July 16 HPI  75 y.o. female with CAD, PAD, DM, possible COPD who presented 7/15/2021 with melena and anemia.    She notes developing dark black stools 1-2 times daily starting 4 days ago.  Last BM this morning at 10:30 AM.  Describes small volume.  No associated hematochezia, abdominal pain, nausea, vomiting, heartburn, dysphagia.  Has some associated dyspnea and dizziness.  No chest pain.  No recent weight loss.     She does take aspirin and meloxicam daily.  Also on Plavix.     She did have colonoscopy 3 years ago in [place] and had polyps.     She did have Moderna COVID vaccine x2     Hemodynamically stable.  Labs show Hgb 7.0 with BUN 39.  Labs from 6/2021 showed Hgb 15.    July 17th: She denies having had additional bowel movements.  Denies vomiting today.    July 18, 2021: Stable. Denies melena. No abdominal pain, N/V. Hgb stable: 7.8 (7.4 on 7/17/21).    Colonoscopy 7/17/21: Right sided colonic stricture with bleeding ulceration. AVM: s/p ablation. Internal hemorrhoid. Diverticular of the left colon.       Review of Systems:  ROS   A complete 12 point review of systems was performed.    Constitutional: Denies fevers, chills, night sweats; Denies weight changes  Eyes: Denies eye pain, denies eye discharge  Ears/Nose/Throat/Mouth: Denies nasal congestion or sore throat   Cardiovascular: Denies chest pain or palpitations.  Respiratory: Denies shortness of breath, cough, and wheezing.  Gastrointestinal/Hepatic: see HPI  Genitourinary: Denies dysuria, increased frequency, hematuria  Musculoskeletal/Rheum: Denies joint pain and swelling, denies edema  Skin: Denies rash, denies wounds  Neurological: Denies headache, confusion, memory loss or focal weakness/parasthesias  Psychiatric: denies mood disorder, denies hallucinations   Endocrine: Denies thyroid  problems; denies polydipsia  Heme/Oncology/Lymph Nodes: Denies enlarged lymph nodes, denies bruising or known bleeding disorder        Physical Exam:  Physical Exam  Constitutional:       General: She is not in acute distress.     Appearance: Normal appearance.   HENT:      Head: Normocephalic.      Mouth/Throat:      Mouth: Mucous membranes are moist.   Eyes:      Extraocular Movements: Extraocular movements intact.      Pupils: Pupils are equal, round, and reactive to light.   Cardiovascular:      Rate and Rhythm: Normal rate and regular rhythm.   Pulmonary:      Effort: Pulmonary effort is normal.      Breath sounds: Normal breath sounds.   Abdominal:      General: Abdomen is flat. Bowel sounds are normal.      Palpations: Abdomen is soft.      Tenderness: There is no abdominal tenderness.   Musculoskeletal:         General: Normal range of motion.      Cervical back: Normal range of motion and neck supple.   Skin:     General: Skin is warm and dry.   Neurological:      General: No focal deficit present.      Mental Status: She is alert and oriented to person, place, and time.       Labs:          Recent Labs     07/15/21  1810 07/15/21  2050 07/16/21  0305   SODIUM 139 139 139   POTASSIUM 4.8 5.1 4.8   CHLORIDE 102 103 105   CO2 27 28 29   BUN 39* 35* 29*   CREATININE 0.72 0.63 0.65   CALCIUM 8.3* 8.0* 8.2*     Recent Labs     07/15/21  1810 07/15/21  2050 07/16/21  0305   ALTSGPT 15 14 13   ASTSGOT 17 15 10*   ALKPHOSPHAT 49 43 42   TBILIRUBIN 0.2 0.2 0.6   LIPASE 44  --   --    GLUCOSE 246* 189* 174*     Recent Labs     07/15/21  2050 07/15/21  2050 07/16/21  0305 07/16/21  1235 07/17/21  0448 07/17/21  1219 07/18/21  0210   RBC 2.04*  --  2.40*  --   --   --  2.61*   HEMOGLOBIN 6.5*   < > 7.4*   < > 7.4* 7.9* 7.8*   HEMATOCRIT 21.3*  --  23.9*  --   --   --  26.2*   PLATELETCT 225  --  224  --   --   --  198   PROTHROMBTM 13.4  --   --   --   --   --   --    APTT 24.9  --   --   --   --   --   --    INR 1.05   --   --   --   --   --   --     < > = values in this interval not displayed.     Recent Labs     07/15/21  1810 07/15/21  1810 07/15/21  2050 07/16/21  0305 21  0210   WBC 10.2   < > 8.3 7.3 4.5*   NEUTSPOLYS 70.50  --  66.40 65.60  --    LYMPHOCYTES 20.60*  --  23.80 22.20  --    MONOCYTES 6.20  --  7.60 10.20  --    EOSINOPHILS 0.30  --  0.20 0.40  --    BASOPHILS 0.50  --  0.50 0.40  --    ASTSGOT 17  --  15 10*  --    ALTSGPT 15  --  14 13  --    ALKPHOSPHAT 49  --  43 42  --    TBILIRUBIN 0.2  --  0.2 0.6  --     < > = values in this interval not displayed.     Hemodynamics:  Temp (24hrs), Av.6 °C (97.8 °F), Min:36.4 °C (97.5 °F), Max:36.8 °C (98.2 °F)  Temperature: 36.5 °C (97.7 °F)  Pulse  Av.8  Min: 63  Max: 100   Blood Pressure : 136/66     Respiratory:    Respiration: 18, Pulse Oximetry: 98 %        RUL Breath Sounds: Clear, RML Breath Sounds: Diminished, RLL Breath Sounds: Diminished, JONNATHAN Breath Sounds: Diminished, LLL Breath Sounds: Diminished  Fluids:    Intake/Output Summary (Last 24 hours) at 2021 0930  Last data filed at 2021 0215  Gross per 24 hour   Intake 555 ml   Output --   Net 555 ml     Weight: 71.9 kg (158 lb 8.2 oz)  GI/Nutrition:  Orders Placed This Encounter   Procedures   • Diet Order Diet: Clear Liquid; Miscellaneous modifications: (optional): No Red     Standing Status:   Standing     Number of Occurrences:   1     Order Specific Question:   Diet:     Answer:   Clear Liquid [10]     Order Specific Question:   Miscellaneous modifications: (optional)     Answer:   No Red [61]     Medical Decision Making, by Problem:  Active Hospital Problems    Diagnosis    • Anemia [D64.9]    • GI bleed [K92.2]    • Dyslipidemia [E78.5]    • Coronary artery disease involving native coronary artery of native heart without angina pectoris [I25.10]    • Type 2 diabetes mellitus (HCC) [E11.9]        Plan:  PROBLEMS:  1. Melena: Resolved  2. Acute blood loss anemia  3. Azotemia likely  secondary to UGI bleed  4. Anticoagulant use with aspirin and PLavix  5. Long term NSAID use  6. CAD  7. PAD  8. DM  9. COPD    EGD today found no abnormalities.  Gastric biopsies taken to rule out H. Pylori.    Colonoscopy:Colonoscopy 7/17/21: Right sided colonic stricture with bleeding ulceration. AVM: s/p ablation. Internal hemorrhoid. Diverticular of the left colon.     PLAN:  -Avoid NSAIDs indefinitely  -May start diabetic diet  -Continue aspirin    GI WILL SIGN OFF PLEASE CALL IF ANY QUESTIONS OR CONCERNS      Kristina LOWE    Quality-Core Measures

## 2021-07-18 NOTE — CARE PLAN
The patient is Stable - Low risk of patient condition declining or worsening    Shift Goals  Clinical Goals: Stabalize Hb  Patient Goals: Rest    Progress made toward(s) clinical / shift goals:  Resting comfortably in bed  Problem: Knowledge Deficit - Standard  Goal: Patient and family/care givers will demonstrate understanding of plan of care, disease process/condition, diagnostic tests and medications  Outcome: Progressing     Problem: Fall Risk  Goal: Patient will remain free from falls  Outcome: Progressing       Patient is not progressing towards the following goals:

## 2021-07-18 NOTE — PROGRESS NOTES
Assumed care at 0700, bedside report received from ALEXANDER Roman. Pt is SR on the telemetry monitor. Patient is AO x 4 and is in bed. Initial assessment completed and orders reviewed. POC discussed with patient. Call light within reach and hourly rounding in place. No further questions at this time. Fall precautions in place.

## 2021-07-18 NOTE — PROGRESS NOTES
[facility] Medicine Daily Progress Note    Date of Service  7/17/2021    Chief Complaint  Kimberly Castañeda is a 75 y.o. female admitted 7/15/2021 with Melena.     Hospital Course  This is a 75 y.o. female with PMHX of CAD, HTN, DM who presented 7/15/2021 with Bloody stool. As per patient she was having dark tarry stools with some bright red blood that started on 7/11/21. Since then she has had 1-2 episodes of dark stool daily, with the last one occurring this morning. She has not had any vomiting, abdominal pain, or dysuria  Here in ER her hemoglobin is 7 and her guaiac is positive   I have ordered for 1 unit of RBC and patient has been started on pantoprazole drip   .GI Dr. Mullins was consulted by ERP, patient underwent EGD on HD#1.  EGD unremarkable.  [place] ON HD#2          Interval Problem Update  No acute overnight events.    I have personally seen and examined the patient at bedside. I discussed the plan of care with patient and GI.    Consultants/Specialty  GI    Code Status  Full Code    Disposition  Patient is not medically cleared.   Anticipate discharge to to home with close outpatient follow-up.  I have placed the appropriate orders for post-discharge needs.    Review of Systems  All systems reviewed and negative except as noted per above.    Physical Exam  Temp:  [36.2 °C (97.1 °F)-37.4 °C (99.4 °F)] 36.8 °C (98.2 °F)  Pulse:  [] 73  Resp:  [14-21] 18  BP: ()/(47-86) 132/47  SpO2:  [90 %-100 %] 97 %    General: No acute distress  HEENT atraumatic, normocephalic, pupils equal round reactive to light  Neck: No JVD  Chest: Respirations are unlabored  Cardiac: Physiologic S1 and S2  Abdomen: Soft, nontender, nondistended  Extremities: Without clubbing, cyanosis or edema  Neuro: Cranial nerves II through XII are grossly intact.  Psych: No anxiety, judgement intact.        Current Facility-Administered Medications:   •  hydrocortisone (ANUSOL-HC) suppository 25 mg, 25 mg, Rectal, Q12HRS, Oh HUBBARD  NIYAH Ochoa, 25 mg at 07/17/21 1240  •  FLUoxetine (PROZAC) capsule 20 mg, 20 mg, Oral, DAILY, Deb Ramos M.D., 20 mg at 07/17/21 0500  •  metoprolol tartrate (LOPRESSOR) tablet 25 mg, 25 mg, Oral, BID, Deb Ramos M.D., 25 mg at 07/17/21 0500  •  simvastatin (ZOCOR) tablet 20 mg, 20 mg, Oral, Q EVENING, Deb Ramos M.D., 20 mg at 07/16/21 1620  •  senna-docusate (PERICOLACE or SENOKOT S) 8.6-50 MG per tablet 2 tablet, 2 tablet, Oral, BID **AND** polyethylene glycol/lytes (MIRALAX) PACKET 1 Packet, 1 Packet, Oral, QDAY PRN **AND** magnesium hydroxide (MILK OF MAGNESIA) suspension 30 mL, 30 mL, Oral, QDAY PRN **AND** bisacodyl (DULCOLAX) suppository 10 mg, 10 mg, Rectal, QDAY PRN, Deb Ramos M.D.  •  Respiratory Therapy Consult, , Nebulization, Continuous RT, Deb Ramos M.D.  •  NS infusion, , Intravenous, Continuous, Deb Ramos M.D., Last Rate: 83 mL/hr at 07/17/21 0500, New Bag at 07/17/21 0500  •  acetaminophen (Tylenol) tablet 650 mg, 650 mg, Oral, Q6HRS PRN, Deb Ramos M.D.  •  insulin regular (HumuLIN R,NovoLIN R) injection, 1-6 Units, Subcutaneous, Q6HRS, 3 Units at 07/17/21 1345 **AND** POC blood glucose manual result, , , Q6H **AND** NOTIFY MD and PharmD, , , Once **AND** glucose 4 g chewable tablet 16 g, 16 g, Oral, Q15 MIN PRN **AND** dextrose 50% (D50W) injection 50 mL, 50 mL, Intravenous, Q15 MIN PRN, Deb Ramos M.D.      Fluids    Intake/Output Summary (Last 24 hours) at 7/17/2021 1719  Last data filed at 7/17/2021 0934  Gross per 24 hour   Intake 320 ml   Output --   Net 320 ml       Laboratory  Recent Labs     07/15/21  1810 07/15/21  1810 07/15/21  2050 07/15/21  2050 07/16/21  0305 07/16/21  1235 07/16/21 2019 07/17/21  0448 07/17/21  1219   WBC 10.2  --  8.3  --  7.3  --   --   --   --    RBC 2.24*  --  2.04*  --  2.40*  --   --   --   --    HEMOGLOBIN 7.0*   < > 6.5*   < > 7.4*   < > 7.9* 7.4* 7.9*   HEMATOCRIT 23.3*  --  21.3*  --  23.9*  --   --   --   --    .0*  --  104.4*  --  99.6*   --   --   --   --    MCH 31.3  --  31.9  --  30.8  --   --   --   --    MCHC 30.0*  --  30.5*  --  31.0*  --   --   --   --    RDW 65.3*  --  64.8*  --  65.0*  --   --   --   --    PLATELETCT 286  --  225  --  224  --   --   --   --    MPV 10.1  --  9.8  --  9.9  --   --   --   --     < > = values in this interval not displayed.     Recent Labs     07/15/21  1810 07/15/21  2050 07/16/21  0305   SODIUM 139 139 139   POTASSIUM 4.8 5.1 4.8   CHLORIDE 102 103 105   CO2 27 28 29   GLUCOSE 246* 189* 174*   BUN 39* 35* 29*   CREATININE 0.72 0.63 0.65   CALCIUM 8.3* 8.0* 8.2*     Recent Labs     07/15/21  2050   APTT 24.9   INR 1.05               Imaging  DX-CHEST-PORTABLE (1 VIEW)   Final Result         1. No acute cardiopulmonary abnormalities are identified.      CT-CTA COMPLETE THORACOABDOMINAL AORTA   Final Result      1.  No evidence of abdominal aortic aneurysm. Ectatic abdominal aorta.   2.  Significant stenosis of the proximal left subclavian artery.   3.  Significant stenosis of the left renal artery ostium with slight hypoenhancement of the left kidney.   4.  Bilateral renal cysts.   5.  Colonic diverticulosis.   6.  Cholelithiasis.   7.  Cardiomegaly.   8.  Coronary artery calcification.   9.  Mild loss of height of T1, T2, T8 and T11 which is of indeterminate age but may be chronic.                                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  [place] on HD#2.    GI assist appreciated.    [place] on HD#1, advance per GI.  Recheck H&H in AM.     Anemia  Assessment & Plan  Due to GI bleed   A unit of RBC has been ordered  Will check H&H every 8 hr     Coronary artery disease involving native coronary artery of native heart without angina pectoris- (present on admission)  Assessment &  Plan  Restart ASA-Plavix per GI.      Type 2 diabetes mellitus (HCC)  Assessment & Plan  Holding her metformin  Starting ssi and accuchecks   Will check a A1c     Dyslipidemia- (present on admission)  Assessment & Plan  Cont her statin        VTE prophylaxis: SCDs/TEDs    I have performed a physical exam and reviewed and updated ROS and Plan today (7/17/2021). In review of yesterday's note (7/16/2021), there are no changes except as documented above.

## 2021-07-18 NOTE — DISCHARGE INSTRUCTIONS
Discharge Instructions    Discharged to home by car with relative. Discharged via walking, hospital escort: Yes.  Special equipment needed: Not Applicable    Be sure to schedule a follow-up appointment with your primary care doctor or any specialists as instructed.     Discharge Plan:   Diet Plan: Discussed  Activity Level: Discussed  Confirmed Follow up Appointment: Appointment Scheduled  Confirmed Symptoms Management: Discussed  Medication Reconciliation Updated: Yes    I understand that a diet low in cholesterol, fat, and sodium is recommended for good health. Unless I have been given specific instructions below for another diet, I accept this instruction as my diet prescription.   Other diet: gi soft      Special Instructions: None    · Is patient discharged on Warfarin / Coumadin?   No   Gastrointestinal Bleeding  Gastrointestinal (GI) bleeding is bleeding somewhere along the path that food travels through the body (digestive tract). This path is anywhere between the mouth and the opening of the butt (anus). You may have blood in your poop (stool) or have black poop. If you throw up (vomit), there may be blood in it.  This condition can be mild, serious, or even life-threatening. If you have a lot of bleeding, you may need to stay in the hospital.  What are the causes?  This condition may be caused by:  · Irritation and swelling of the esophagus (esophagitis). The esophagus is part of the body that moves food from your mouth to your stomach.  · Swollen veins in the butt (hemorrhoids).  · Areas of painful tearing in the opening of the butt (anal fissures). These are often caused by passing hard poop.  · Pouches that form on the colon over time (diverticulosis).  · Irritation and swelling (diverticulitis) in areas where pouches have formed on the colon.  · Growths (polyps) or cancer. Colon cancer often starts out as growths that are not cancer.  · Irritation of the stomach lining (gastritis).  · Sores (ulcers) in  the stomach.  What increases the risk?  You are more likely to develop this condition if you:  · Have a certain type of infection in your stomach (Helicobacter pylori infection).  · Take certain medicines.  · Smoke.  · Drink alcohol.  What are the signs or symptoms?  Common symptoms of this condition include:  · Throwing up (vomiting) material that has bright red blood in it. It may look like coffee grounds.  · Changes in your poop. The poop may:  ? Have red blood in it.  ? Be black, look like tar, and smell stronger than normal.  ? Be red.  · Pain or cramping in the belly (abdomen).  How is this treated?  Treatment for this condition depends on the cause of the bleeding. For example:  · Sometimes, the bleeding can be stopped during a procedure that is done to find the problem (endoscopy or colonoscopy).  · Medicines can be used to:  ? Help control irritation, swelling, or infection.  ? Reduce acid in your stomach.  · Certain problems can be treated with:  ? Creams.  ? Medicines that are put in the butt (suppositories).  ? Warm baths.  · Surgery is sometimes needed.  · If you lose a lot of blood, you may need a blood transfusion.  If bleeding is mild, you may be allowed to go home. If there is a lot of bleeding, you will need to stay in the hospital.  Follow these instructions at home:    · Take over-the-counter and prescription medicines only as told by your doctor.  · Eat foods that have a lot of fiber in them. These foods include beans, whole grains, and fresh fruits and vegetables. You can also try eating 1-3 prunes each day.  · Drink enough fluid to keep your pee (urine) pale yellow.  · Keep all follow-up visits as told by your doctor. This is important.  Contact a doctor if:  · Your symptoms do not get better.  Get help right away if:  · Your bleeding does not stop.  · You feel dizzy or you pass out (faint).  · You feel weak.  · You have very bad cramps in your back or belly.  · You pass large clumps of blood  (clots) in your poop.  · Your symptoms are getting worse.  · You have chest pain or fast heartbeats.  Summary  · GI bleeding is bleeding somewhere along the path that food travels through the body (digestive tract).  · This bleeding can be caused by many things. Treatment depends on the cause of the bleeding.  · Take medicines only as told by your doctor.  · Keep all follow-up visits as told by your doctor. This is important.  This information is not intended to replace advice given to you by your health care provider. Make sure you discuss any questions you have with your health care provider.  Document Released: 09/26/2009 Document Revised: 07/31/2019 Document Reviewed: 07/31/2019  [employer] Patient Education © 2020 [employer]      Depression / Suicide Risk    As you are discharged from this [facility] facility, it is important to learn how to keep safe from harming yourself.    Recognize the warning signs:  · Abrupt changes in personality, positive or negative- including increase in energy   · Giving away possessions  · Change in eating patterns- significant weight changes-  positive or negative  · Change in sleeping patterns- unable to sleep or sleeping all the time   · Unwillingness or inability to communicate  · Depression  · Unusual sadness, discouragement and loneliness  · Talk of wanting to die  · Neglect of personal appearance   · Rebelliousness- reckless behavior  · Withdrawal from people/activities they love  · Confusion- inability to concentrate     If you or a loved one observes any of these behaviors or has concerns about self-harm, here's what you can do:  · Talk about it- your feelings and reasons for harming yourself  · Remove any means that you might use to hurt yourself (examples: pills, rope, extension cords, firearm)  · Get professional help from the community (Mental Health, Substance Abuse, psychological counseling)  · Do not be alone:Call your Safe Contact- someone whom you trust who  will be there for you.  · Call your local CRISIS HOTLINE 247-0714 or 016-619-3791  · Call your local Children's Mobile Crisis Response Team [place] (763) 668-5530 or www.Imcompany  · Call the toll free National Suicide Prevention Hotlines   · National Suicide Prevention Lifeline 136-798-HOWS (3873)  · National ArriveBefore Line Network 800-SUICIDE (319-6760)

## 2022-10-21 ENCOUNTER — HOSPITAL ENCOUNTER (INPATIENT)
Facility: MEDICAL CENTER | Age: 77
LOS: 4 days | DRG: 291 | End: 2022-10-25
Attending: EMERGENCY MEDICINE | Admitting: STUDENT IN AN ORGANIZED HEALTH CARE EDUCATION/TRAINING PROGRAM
Payer: MEDICARE

## 2022-10-21 ENCOUNTER — APPOINTMENT (OUTPATIENT)
Dept: CARDIOLOGY | Facility: MEDICAL CENTER | Age: 77
DRG: 291 | End: 2022-10-21
Attending: STUDENT IN AN ORGANIZED HEALTH CARE EDUCATION/TRAINING PROGRAM
Payer: MEDICARE

## 2022-10-21 ENCOUNTER — APPOINTMENT (OUTPATIENT)
Dept: RADIOLOGY | Facility: MEDICAL CENTER | Age: 77
DRG: 291 | End: 2022-10-21
Attending: EMERGENCY MEDICINE
Payer: MEDICARE

## 2022-10-21 DIAGNOSIS — I50.9 ACUTE CONGESTIVE HEART FAILURE, UNSPECIFIED HEART FAILURE TYPE (HCC): ICD-10-CM

## 2022-10-21 DIAGNOSIS — I50.32 CHRONIC DIASTOLIC HEART FAILURE (HCC): ICD-10-CM

## 2022-10-21 DIAGNOSIS — J96.21 ACUTE ON CHRONIC RESPIRATORY FAILURE WITH HYPOXIA (HCC): ICD-10-CM

## 2022-10-21 PROBLEM — D75.1 POLYCYTHEMIA: Status: ACTIVE | Noted: 2022-10-21

## 2022-10-21 PROBLEM — E87.70 VOLUME OVERLOAD: Status: ACTIVE | Noted: 2022-10-21

## 2022-10-21 PROBLEM — N63.20 LEFT BREAST LUMP: Status: ACTIVE | Noted: 2022-10-21

## 2022-10-21 PROBLEM — R79.89 TROPONIN LEVEL ELEVATED: Status: ACTIVE | Noted: 2022-10-21

## 2022-10-21 LAB
ALBUMIN SERPL BCP-MCNC: 3.6 G/DL (ref 3.2–4.9)
ALBUMIN/GLOB SERPL: 1.6 G/DL
ALP SERPL-CCNC: 70 U/L (ref 30–99)
ALT SERPL-CCNC: 27 U/L (ref 2–50)
ANION GAP SERPL CALC-SCNC: 10 MMOL/L (ref 7–16)
ANION GAP SERPL CALC-SCNC: 7 MMOL/L (ref 7–16)
AST SERPL-CCNC: 22 U/L (ref 12–45)
BASOPHILS # BLD AUTO: 0.4 % (ref 0–1.8)
BASOPHILS # BLD: 0.03 K/UL (ref 0–0.12)
BILIRUB SERPL-MCNC: 0.7 MG/DL (ref 0.1–1.5)
BUN SERPL-MCNC: 26 MG/DL (ref 8–22)
BUN SERPL-MCNC: 27 MG/DL (ref 8–22)
CALCIUM SERPL-MCNC: 9.4 MG/DL (ref 8.5–10.5)
CALCIUM SERPL-MCNC: 9.5 MG/DL (ref 8.5–10.5)
CHLORIDE SERPL-SCNC: 91 MMOL/L (ref 96–112)
CHLORIDE SERPL-SCNC: 97 MMOL/L (ref 96–112)
CHOLEST SERPL-MCNC: 131 MG/DL (ref 100–199)
CO2 SERPL-SCNC: 34 MMOL/L (ref 20–33)
CO2 SERPL-SCNC: 38 MMOL/L (ref 20–33)
CREAT SERPL-MCNC: 0.68 MG/DL (ref 0.5–1.4)
CREAT SERPL-MCNC: 0.99 MG/DL (ref 0.5–1.4)
EKG IMPRESSION: NORMAL
EOSINOPHIL # BLD AUTO: 0.01 K/UL (ref 0–0.51)
EOSINOPHIL NFR BLD: 0.1 % (ref 0–6.9)
ERYTHROCYTE [DISTWIDTH] IN BLOOD BY AUTOMATED COUNT: 63.1 FL (ref 35.9–50)
EST. AVERAGE GLUCOSE BLD GHB EST-MCNC: 229 MG/DL
FLUAV RNA SPEC QL NAA+PROBE: NEGATIVE
FLUBV RNA SPEC QL NAA+PROBE: NEGATIVE
GFR SERPLBLD CREATININE-BSD FMLA CKD-EPI: 58 ML/MIN/1.73 M 2
GFR SERPLBLD CREATININE-BSD FMLA CKD-EPI: 89 ML/MIN/1.73 M 2
GLOBULIN SER CALC-MCNC: 2.2 G/DL (ref 1.9–3.5)
GLUCOSE BLD STRIP.AUTO-MCNC: 146 MG/DL (ref 65–99)
GLUCOSE BLD STRIP.AUTO-MCNC: 176 MG/DL (ref 65–99)
GLUCOSE BLD STRIP.AUTO-MCNC: 343 MG/DL (ref 65–99)
GLUCOSE SERPL-MCNC: 272 MG/DL (ref 65–99)
GLUCOSE SERPL-MCNC: 365 MG/DL (ref 65–99)
HBA1C MFR BLD: 9.6 % (ref 4–5.6)
HCT VFR BLD AUTO: 57.9 % (ref 37–47)
HDLC SERPL-MCNC: 55 MG/DL
HGB BLD-MCNC: 18.7 G/DL (ref 12–16)
IMM GRANULOCYTES # BLD AUTO: 0.02 K/UL (ref 0–0.11)
IMM GRANULOCYTES NFR BLD AUTO: 0.3 % (ref 0–0.9)
LDLC SERPL CALC-MCNC: 40 MG/DL
LV EJECT FRACT  99904: 55
LV EJECT FRACT MOD 2C 99903: 57.81
LV EJECT FRACT MOD 4C 99902: 56.14
LV EJECT FRACT MOD BP 99901: 55.83
LYMPHOCYTES # BLD AUTO: 1.08 K/UL (ref 1–4.8)
LYMPHOCYTES NFR BLD: 15.9 % (ref 22–41)
MAGNESIUM SERPL-MCNC: 1.7 MG/DL (ref 1.5–2.5)
MCH RBC QN AUTO: 31.5 PG (ref 27–33)
MCHC RBC AUTO-ENTMCNC: 32.3 G/DL (ref 33.6–35)
MCV RBC AUTO: 97.5 FL (ref 81.4–97.8)
MONOCYTES # BLD AUTO: 0.55 K/UL (ref 0–0.85)
MONOCYTES NFR BLD AUTO: 8.1 % (ref 0–13.4)
NEUTROPHILS # BLD AUTO: 5.1 K/UL (ref 2–7.15)
NEUTROPHILS NFR BLD: 75.2 % (ref 44–72)
NRBC # BLD AUTO: 0 K/UL
NRBC BLD-RTO: 0 /100 WBC
NT-PROBNP SERPL IA-MCNC: 6446 PG/ML (ref 0–125)
PLATELET # BLD AUTO: 375 K/UL (ref 164–446)
PMV BLD AUTO: 13.1 FL (ref 9–12.9)
POTASSIUM SERPL-SCNC: 4 MMOL/L (ref 3.6–5.5)
POTASSIUM SERPL-SCNC: 4.6 MMOL/L (ref 3.6–5.5)
PROT SERPL-MCNC: 5.8 G/DL (ref 6–8.2)
RBC # BLD AUTO: 5.94 M/UL (ref 4.2–5.4)
RSV RNA SPEC QL NAA+PROBE: NEGATIVE
SARS-COV-2 RNA RESP QL NAA+PROBE: NOTDETECTED
SODIUM SERPL-SCNC: 138 MMOL/L (ref 135–145)
SODIUM SERPL-SCNC: 139 MMOL/L (ref 135–145)
SPECIMEN SOURCE: NORMAL
TRIGL SERPL-MCNC: 181 MG/DL (ref 0–149)
TROPONIN T SERPL-MCNC: 20 NG/L (ref 6–19)
TROPONIN T SERPL-MCNC: 21 NG/L (ref 6–19)
TSH SERPL DL<=0.005 MIU/L-ACNC: 2.32 UIU/ML (ref 0.38–5.33)
WBC # BLD AUTO: 6.8 K/UL (ref 4.8–10.8)

## 2022-10-21 PROCEDURE — 36415 COLL VENOUS BLD VENIPUNCTURE: CPT

## 2022-10-21 PROCEDURE — 93306 TTE W/DOPPLER COMPLETE: CPT | Mod: 26 | Performed by: INTERNAL MEDICINE

## 2022-10-21 PROCEDURE — 84443 ASSAY THYROID STIM HORMONE: CPT

## 2022-10-21 PROCEDURE — 83036 HEMOGLOBIN GLYCOSYLATED A1C: CPT

## 2022-10-21 PROCEDURE — 85025 COMPLETE CBC W/AUTO DIFF WBC: CPT

## 2022-10-21 PROCEDURE — 71045 X-RAY EXAM CHEST 1 VIEW: CPT

## 2022-10-21 PROCEDURE — 770020 HCHG ROOM/CARE - TELE (206)

## 2022-10-21 PROCEDURE — 83880 ASSAY OF NATRIURETIC PEPTIDE: CPT

## 2022-10-21 PROCEDURE — 96365 THER/PROPH/DIAG IV INF INIT: CPT

## 2022-10-21 PROCEDURE — 82962 GLUCOSE BLOOD TEST: CPT | Mod: 91

## 2022-10-21 PROCEDURE — C9803 HOPD COVID-19 SPEC COLLECT: HCPCS | Performed by: INTERNAL MEDICINE

## 2022-10-21 PROCEDURE — 700102 HCHG RX REV CODE 250 W/ 637 OVERRIDE(OP): Performed by: STUDENT IN AN ORGANIZED HEALTH CARE EDUCATION/TRAINING PROGRAM

## 2022-10-21 PROCEDURE — 80048 BASIC METABOLIC PNL TOTAL CA: CPT

## 2022-10-21 PROCEDURE — 83735 ASSAY OF MAGNESIUM: CPT

## 2022-10-21 PROCEDURE — 84484 ASSAY OF TROPONIN QUANT: CPT

## 2022-10-21 PROCEDURE — 0241U HCHG SARS-COV-2 COVID-19 NFCT DS RESP RNA 4 TRGT MIC: CPT

## 2022-10-21 PROCEDURE — 99223 1ST HOSP IP/OBS HIGH 75: CPT | Mod: AI,GC | Performed by: STUDENT IN AN ORGANIZED HEALTH CARE EDUCATION/TRAINING PROGRAM

## 2022-10-21 PROCEDURE — 99285 EMERGENCY DEPT VISIT HI MDM: CPT

## 2022-10-21 PROCEDURE — 96375 TX/PRO/DX INJ NEW DRUG ADDON: CPT

## 2022-10-21 PROCEDURE — 93306 TTE W/DOPPLER COMPLETE: CPT

## 2022-10-21 PROCEDURE — 93005 ELECTROCARDIOGRAM TRACING: CPT | Performed by: EMERGENCY MEDICINE

## 2022-10-21 PROCEDURE — A9270 NON-COVERED ITEM OR SERVICE: HCPCS | Performed by: STUDENT IN AN ORGANIZED HEALTH CARE EDUCATION/TRAINING PROGRAM

## 2022-10-21 PROCEDURE — 700111 HCHG RX REV CODE 636 W/ 250 OVERRIDE (IP): Performed by: STUDENT IN AN ORGANIZED HEALTH CARE EDUCATION/TRAINING PROGRAM

## 2022-10-21 PROCEDURE — 80053 COMPREHEN METABOLIC PANEL: CPT

## 2022-10-21 PROCEDURE — 80061 LIPID PANEL: CPT

## 2022-10-21 PROCEDURE — 96366 THER/PROPH/DIAG IV INF ADDON: CPT

## 2022-10-21 PROCEDURE — 96376 TX/PRO/DX INJ SAME DRUG ADON: CPT

## 2022-10-21 PROCEDURE — 700111 HCHG RX REV CODE 636 W/ 250 OVERRIDE (IP): Performed by: INTERNAL MEDICINE

## 2022-10-21 PROCEDURE — 96372 THER/PROPH/DIAG INJ SC/IM: CPT

## 2022-10-21 RX ORDER — SIMVASTATIN 40 MG
40 TABLET ORAL EVERY EVENING
Status: DISCONTINUED | OUTPATIENT
Start: 2022-10-21 | End: 2022-10-25 | Stop reason: HOSPADM

## 2022-10-21 RX ORDER — BENAZEPRIL HYDROCHLORIDE 20 MG/1
40 TABLET ORAL DAILY
Status: DISCONTINUED | OUTPATIENT
Start: 2022-10-21 | End: 2022-10-22

## 2022-10-21 RX ORDER — FUROSEMIDE 10 MG/ML
40 INJECTION INTRAMUSCULAR; INTRAVENOUS
Status: DISCONTINUED | OUTPATIENT
Start: 2022-10-21 | End: 2022-10-21

## 2022-10-21 RX ORDER — FUROSEMIDE 10 MG/ML
80 INJECTION INTRAMUSCULAR; INTRAVENOUS
Status: DISCONTINUED | OUTPATIENT
Start: 2022-10-21 | End: 2022-10-22

## 2022-10-21 RX ORDER — ASPIRIN 81 MG/1
81 TABLET, CHEWABLE ORAL DAILY
Status: DISCONTINUED | OUTPATIENT
Start: 2022-10-21 | End: 2022-10-25 | Stop reason: HOSPADM

## 2022-10-21 RX ORDER — BENAZEPRIL HYDROCHLORIDE 40 MG/1
40 TABLET ORAL DAILY
COMMUNITY

## 2022-10-21 RX ORDER — POLYETHYLENE GLYCOL 3350 17 G/17G
1 POWDER, FOR SOLUTION ORAL
Status: DISCONTINUED | OUTPATIENT
Start: 2022-10-21 | End: 2022-10-25 | Stop reason: HOSPADM

## 2022-10-21 RX ORDER — NYSTATIN 100000 [USP'U]/G
POWDER TOPICAL 2 TIMES DAILY
Status: COMPLETED | OUTPATIENT
Start: 2022-10-21 | End: 2022-10-23

## 2022-10-21 RX ORDER — AMOXICILLIN 250 MG
2 CAPSULE ORAL 2 TIMES DAILY
Status: DISCONTINUED | OUTPATIENT
Start: 2022-10-21 | End: 2022-10-25 | Stop reason: HOSPADM

## 2022-10-21 RX ORDER — BISACODYL 10 MG
10 SUPPOSITORY, RECTAL RECTAL
Status: DISCONTINUED | OUTPATIENT
Start: 2022-10-21 | End: 2022-10-25 | Stop reason: HOSPADM

## 2022-10-21 RX ORDER — ENOXAPARIN SODIUM 100 MG/ML
40 INJECTION SUBCUTANEOUS DAILY
Status: DISCONTINUED | OUTPATIENT
Start: 2022-10-21 | End: 2022-10-25 | Stop reason: HOSPADM

## 2022-10-21 RX ORDER — MAGNESIUM SULFATE HEPTAHYDRATE 40 MG/ML
4 INJECTION, SOLUTION INTRAVENOUS ONCE
Status: COMPLETED | OUTPATIENT
Start: 2022-10-21 | End: 2022-10-21

## 2022-10-21 RX ORDER — FLUOXETINE HYDROCHLORIDE 20 MG/1
20 CAPSULE ORAL DAILY
Status: DISCONTINUED | OUTPATIENT
Start: 2022-10-21 | End: 2022-10-25 | Stop reason: HOSPADM

## 2022-10-21 RX ORDER — DEXTROSE MONOHYDRATE 25 G/50ML
25 INJECTION, SOLUTION INTRAVENOUS
Status: DISCONTINUED | OUTPATIENT
Start: 2022-10-21 | End: 2022-10-25 | Stop reason: HOSPADM

## 2022-10-21 RX ORDER — ACETAMINOPHEN 325 MG/1
650 TABLET ORAL EVERY 6 HOURS PRN
Status: DISCONTINUED | OUTPATIENT
Start: 2022-10-21 | End: 2022-10-25 | Stop reason: HOSPADM

## 2022-10-21 RX ORDER — EMPAGLIFLOZIN 25 MG/1
25 TABLET, FILM COATED ORAL DAILY
COMMUNITY

## 2022-10-21 RX ADMIN — FUROSEMIDE 80 MG: 10 INJECTION, SOLUTION INTRAMUSCULAR; INTRAVENOUS at 09:34

## 2022-10-21 RX ADMIN — FUROSEMIDE 80 MG: 10 INJECTION, SOLUTION INTRAMUSCULAR; INTRAVENOUS at 17:26

## 2022-10-21 RX ADMIN — ENOXAPARIN SODIUM 40 MG: 40 INJECTION SUBCUTANEOUS at 17:27

## 2022-10-21 RX ADMIN — FLUOXETINE HYDROCHLORIDE 20 MG: 20 CAPSULE ORAL at 05:43

## 2022-10-21 RX ADMIN — INSULIN HUMAN 6 UNITS: 100 INJECTION, SOLUTION PARENTERAL at 12:49

## 2022-10-21 RX ADMIN — NYSTATIN: 100000 POWDER TOPICAL at 17:27

## 2022-10-21 RX ADMIN — METOPROLOL TARTRATE 25 MG: 25 TABLET, FILM COATED ORAL at 05:43

## 2022-10-21 RX ADMIN — BENAZEPRIL HYDROCHLORIDE 40 MG: 20 TABLET ORAL at 05:43

## 2022-10-21 RX ADMIN — MAGNESIUM SULFATE HEPTAHYDRATE 4 G: 40 INJECTION, SOLUTION INTRAVENOUS at 05:42

## 2022-10-21 RX ADMIN — FUROSEMIDE 40 MG: 10 INJECTION, SOLUTION INTRAMUSCULAR; INTRAVENOUS at 03:25

## 2022-10-21 RX ADMIN — SENNOSIDES AND DOCUSATE SODIUM 2 TABLET: 50; 8.6 TABLET ORAL at 17:27

## 2022-10-21 RX ADMIN — ASPIRIN 81 MG 81 MG: 81 TABLET ORAL at 05:43

## 2022-10-21 RX ADMIN — NYSTATIN: 100000 POWDER TOPICAL at 05:43

## 2022-10-21 RX ADMIN — INSULIN HUMAN 6 UNITS: 100 INJECTION, SOLUTION PARENTERAL at 17:26

## 2022-10-21 RX ADMIN — INSULIN HUMAN 2 UNITS: 100 INJECTION, SOLUTION PARENTERAL at 09:44

## 2022-10-21 ASSESSMENT — ENCOUNTER SYMPTOMS
DOUBLE VISION: 0
SPEECH CHANGE: 0
SPUTUM PRODUCTION: 0
SENSORY CHANGE: 0
DIARRHEA: 0
NAUSEA: 0
SHORTNESS OF BREATH: 1
NECK PAIN: 0
CLAUDICATION: 0
LOSS OF CONSCIOUSNESS: 0
MYALGIAS: 0
ABDOMINAL PAIN: 0
BLURRED VISION: 0
VOMITING: 0
CONSTIPATION: 0
CHILLS: 0
PALPITATIONS: 0
DEPRESSION: 0
HEADACHES: 0
COUGH: 0
FEVER: 0
WHEEZING: 0
HEMOPTYSIS: 0
ORTHOPNEA: 1
DIZZINESS: 0
WEAKNESS: 0
WEIGHT LOSS: 0
PHOTOPHOBIA: 0

## 2022-10-21 ASSESSMENT — COGNITIVE AND FUNCTIONAL STATUS - GENERAL
SUGGESTED CMS G CODE MODIFIER DAILY ACTIVITY: CJ
SUGGESTED CMS G CODE MODIFIER MOBILITY: CJ
DRESSING REGULAR LOWER BODY CLOTHING: A LITTLE
DAILY ACTIVITIY SCORE: 22
MOBILITY SCORE: 21
MOVING FROM LYING ON BACK TO SITTING ON SIDE OF FLAT BED: A LITTLE
CLIMB 3 TO 5 STEPS WITH RAILING: A LITTLE
HELP NEEDED FOR BATHING: A LITTLE
STANDING UP FROM CHAIR USING ARMS: A LITTLE

## 2022-10-21 ASSESSMENT — LIFESTYLE VARIABLES
AVERAGE NUMBER OF DAYS PER WEEK YOU HAVE A DRINK CONTAINING ALCOHOL: 0
ON A TYPICAL DAY WHEN YOU DRINK ALCOHOL HOW MANY DRINKS DO YOU HAVE: 0
TOTAL SCORE: 0
SUBSTANCE_ABUSE: 0
DO YOU DRINK ALCOHOL: NO
DOES PATIENT WANT TO STOP DRINKING: NO
EVER FELT BAD OR GUILTY ABOUT YOUR DRINKING: NO
TOTAL SCORE: 0
EVER HAD A DRINK FIRST THING IN THE MORNING TO STEADY YOUR NERVES TO GET RID OF A HANGOVER: NO
TOTAL SCORE: 0
CONSUMPTION TOTAL: NEGATIVE
HOW MANY TIMES IN THE PAST YEAR HAVE YOU HAD 5 OR MORE DRINKS IN A DAY: 0
HAVE PEOPLE ANNOYED YOU BY CRITICIZING YOUR DRINKING: NO
HAVE YOU EVER FELT YOU SHOULD CUT DOWN ON YOUR DRINKING: NO

## 2022-10-21 ASSESSMENT — PATIENT HEALTH QUESTIONNAIRE - PHQ9
1. LITTLE INTEREST OR PLEASURE IN DOING THINGS: NOT AT ALL
2. FEELING DOWN, DEPRESSED, IRRITABLE, OR HOPELESS: NOT AT ALL
SUM OF ALL RESPONSES TO PHQ9 QUESTIONS 1 AND 2: 0

## 2022-10-21 ASSESSMENT — PAIN DESCRIPTION - PAIN TYPE: TYPE: ACUTE PAIN

## 2022-10-21 ASSESSMENT — FIBROSIS 4 INDEX: FIB4 SCORE: 1.09

## 2022-10-21 NOTE — ASSESSMENT & PLAN NOTE
Not significantly elevated with no chest pain and no ischemic changes on EKG  Trending troponin is normal  Telemetry   echo EF 55% DD grade 2.

## 2022-10-21 NOTE — HOSPITAL COURSE
76-year-old female with history of coronary artery disease, s/p stent, hypertension dyslipidemia and chronic respiratory failure on 2 L nasal cannula who presented 10/21 with worsening shortness of breath.  Patient has had worsening shortness of breath and lower extremity edema, no chest pain no diarrhea or other symptoms.  Patient was found to have crackles with lower extremity edema.  Patient needed 4 L nasal cannula, her baseline 2 L nasal cannula.  Lasix was initiated.  Echo is pending.

## 2022-10-21 NOTE — ED NOTES
Med rec completed per patient and med list at bedside (list returned)  Allergies reviewed    Patient states she took an unknown antibiotic for a bladder infection, and completed it about 3 weeks ago.

## 2022-10-21 NOTE — ED TRIAGE NOTES
Kimberly Castañeda  76 y.o. female  Chief Complaint   Patient presents with   • Shortness of Breath     Pt is a transfer from [facility]. She has been experiencing SOB, ANDERSON, and BLE pitting edema for the last two weeks. She wears 2L baseline, up to 4L now. Pt had 40mg of lasix at sending facility with 2500 urine output.      Pt arrived to ER via med flight. Transferred independently to [facility] upon arrival. PIV in place. NAD.     Vitals:    10/21/22 0031   BP: 110/71   Pulse: 64   Resp: 16   Temp: 36.5 °C (97.7 °F)   SpO2: 94%

## 2022-10-21 NOTE — ASSESSMENT & PLAN NOTE
Came with crackles and lower extremity edema  Echo showed grade 2 diastolic dysfunction with reduced on right ventricle function, ejection fraction 55%  Improving on her symptoms with Lasix,  10/23  Repeat CXR improved, changed IV lasix to po 20mg daily.  CO2 elevated to 41, but normal mentation.  Plan not to overdiurese, cut back lasix.  Still on 4L  Continue gently diuresis  I&Os

## 2022-10-21 NOTE — ED PROVIDER NOTES
"ER Provider Note     Scribed for Sebas Hernandez M.D. by Scottie Monae. 10/21/2022, 12:31 AM.    Primary Care Provider: Bud Casas M.D.  Means of Arrival: med flight   History obtained from: Patient  History limited by: None     CHIEF COMPLAINT  Chief Complaint   Patient presents with    Shortness of Breath     Pt is a transfer from [facility]. She has been experiencing SOB, ANDERSON, and BLE pitting edema for the last two weeks. She wears 2L baseline, up to 4L now. Pt had 40mg of lasix at sending facility with 2500 urine output.        [facility]  Kimberly Castañeda is a 76 y.o. female who presents to the Emergency Department as a transfer from [facility] for shortness of breath onset the past two weeks. Her shortness of breath has been persistent despite supplemental oxygen use. She has associated peripheral edema on her bilateral legs. She currently states that she is feeling \"okay\". There were no alleviating or exacerbating factors noted. She uses supplemental oxygen at home of 2L, but is requiring 4L now. At [facility] she received 40 mg of lasix which provided 2500 urine output.    REVIEW OF SYSTEMS  See [facility] for further details. All other systems are negative.     PAST MEDICAL HISTORY   has a past medical history of Abnormal mammogram (3/22/2018), Arthritis/arthropathy of multiple joints (12/12/2018), Back pain, BRBPR (bright red blood per rectum) (6/1/2017), Bronchitis, Cardiac arrhythmia, Cellulitis and abscess of hand (12/12/2018), Chickenpox, Controlled type 2 diabetes mellitus without complication (HCC) (4/27/2017), Coronary artery disease involving native coronary artery of native heart without angina pectoris (4/27/2017), Cough, Diarrhea, Dyslipidemia (4/27/2017), Essential hypertension (4/27/2017), Exposure (9/20/2017), Mosotho measles, GI bleed, H/O colonoscopy with polypectomy (6/1/2017), History of recent dental procedure (10/16/2019), Hypoxemia (4/27/2017), Lipoma of torso (7/12/2018), Low " hemoglobin (10/16/2019), Mumps, Obesity, PAD (peripheral artery disease) (HCC) (4/27/2017), Painful joint, Post herpetic neuralgia (8/22/2019), Reactive depression (4/27/2017), Rhinitis, Shingles outbreak (06/2019), Sore muscles, Swelling of lower extremity, Tobacco abuse (4/27/2017), Tonsillitis, 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    SURGICAL HISTORY   has a past surgical history that includes stent placement (2007); tubal ligation (1983); hysterectomy laparoscopy (1988); appendectomy; vein stripping; tonsillectomy; upper gi endoscopy,diagnosis (N/A, 7/16/2021); upper gi endoscopy,biopsy (N/A, 7/16/2021); colonoscopy,diagnostic (N/A, 7/17/2021); colonoscpy,flex,w/dir submuc inject (N/A, 7/17/2021); colonoscopy,flex,w/control, bleeding (N/A, 7/17/2021); and colonoscopy with apc (N/A, 7/17/2021).    SOCIAL HISTORY  Social History     Tobacco Use    Smoking status: Former     Packs/day: 0.25     Years: 60.00     Pack years: 15.00     Types: Cigarettes    Smokeless tobacco: Never   Vaping Use    Vaping Use: Never used   Substance Use Topics    Alcohol use: No    Drug use: No      Social History     Substance and Sexual Activity   Drug Use No       FAMILY HISTORY  Family History   Problem Relation Age of Onset    Cancer Mother         rectal    Arthritis Father     Alcohol/Drug Father     Heart Disease Father     Diabetes Brother     Cancer Maternal Aunt         colon       CURRENT MEDICATIONS  Current Outpatient Medications   Medication Instructions    aspirin (ASA) 81 mg, Oral, DAILY    ferrous sulfate (FE TABS) 325 mg, Oral, 3 TIMES DAILY WITH MEALS    FLUoxetine (PROZAC) 20 mg, Oral, DAILY    glipiZIDE (GLUCOTROL) 10 mg, Oral, 2 TIMES DAILY    meloxicam (MOBIC) 7.5 mg, Oral, EVERY BEDTIME    metformin (GLUCOPHAGE) 1,000-1,500 mg, Oral, 2 TIMES DAILY WITH MEALS, 1 tablet = 1000 mg every morning<BR>1 1/2 tablet =  "1500 mg every evening    metoprolol tartrate (LOPRESSOR) 25 mg, Oral, 2 TIMES DAILY    nitroglycerin (NITROSTAT) 0.4 mg, Sublingual, PRN    NON SPECIFIED Portable Oxygen<BR>Dx: COPD    pioglitazone (ACTOS) 45 mg, Oral, EVERY MORNING    simvastatin (ZOCOR) 20 mg, Oral, EVERY EVENING       ALLERGIES  Allergies   Allergen Reactions    Aldactone [Kdc:Yellow Dye+Spironolactone] Palpitations    Percocet [Apap-Fd&C Red #40 [place]-Oxycodone] Rash     Full body  Patient states she can take if she also takes Benadryl       PHYSICAL EXAM  VITAL SIGNS: Ht 1.676 m (5' 6\")   Wt 72.6 kg (160 lb)   BMI 25.82 kg/m²      Constitutional: Alert in no apparent distress.  HENT: No signs of trauma, Bilateral external ears normal, Nose normal.   Eyes: Pupils are equal and reactive, Conjunctiva normal, Non-icteric.   Neck: Normal range of motion, No tenderness, Supple, No stridor.   Lymphatic: No lymphadenopathy noted.   Cardiovascular: Regular rate and rhythm, no palpable thrill  Thorax & Lungs: Diminished lung sounds in the bases. No respiratory distress,  No chest tenderness.  CTAB  Abdomen: Bowel sounds normal, Soft, No tenderness, No masses, No pulsatile masses. No peritoneal signs.  Skin: Warm, Dry, No erythema, No rash.   Back: No bony tenderness, No CVA tenderness.   Extremities: Edema in the bilateral lower extremities. Intact distal pulses, No tenderness, No cyanosis.  Musculoskeletal: Good range of motion in all major joints. No tenderness to palpation or major deformities noted.   Neurologic: Alert , Normal motor function, Normal sensory function, No focal deficits noted.   Psychiatric: Affect normal, Judgment normal, Mood normal.     DIAGNOSTIC STUDIES / PROCEDURES    EKG Interpretation:  Interpreted by me  12 Lead EKG interpreted by me to show:  Normal sinus rhythm  Rate 61  Axis: Normal  Intervals: Normal  T wave inversion in lead three, different from prior  Normal ST segments, so ST elevation  Large voltage in V2, V3, " and V4  My impression of this EKG: Does not indicate ischemia or arrhythmia at this time. Borderline EKG    LABS  Labs Reviewed   CBC WITH DIFFERENTIAL - Abnormal; Notable for the following components:       Result Value    RBC 5.94 (*)     Hemoglobin 18.7 (*)     Hematocrit 57.9 (*)     MCHC 32.3 (*)     RDW 63.1 (*)     MPV 13.1 (*)     Neutrophils-Polys 75.20 (*)     Lymphocytes 15.90 (*)     All other components within normal limits    Narrative:     Biotin intake of greater than 5 mg per day may interfere with  troponin levels, causing false low values.   BASIC METABOLIC PANEL    Narrative:     Recollect. Specimen hemolyzed. Notifed RN [id number]   TROPONIN    Narrative:     Recollect. Specimen hemolyzed. Notifed RN [id number]   PROBRAIN NATRIURETIC PEPTIDE, NT    Narrative:     Recollect. Specimen hemolyzed. Notifed RN [id number]   MAGNESIUM   TSH   HEMOGLOBIN A1C   LIPID PROFILE   MAGNESIUM    Narrative:     Recollect. Specimen hemolyzed. Notifed RN [id number]   LIPID PROFILE    Narrative:     Recollect. Specimen hemolyzed. Notifed RN [id number]         All labs reviewed by me.    RADIOLOGY  DX-CHEST-PORTABLE (1 VIEW)   Final Result         1.  Pulmonary edema and/or infiltrates.   2.  Small bilateral pleural effusions   3.  Cardiomegaly   4.  Atherosclerosis      EC-ECHOCARDIOGRAM COMPLETE W/O CONT    (Results Pending)      The radiologist's interpretation of all radiological studies have been reviewed by me.    COURSE & MEDICAL DECISION MAKING  Pertinent Labs & Imaging studies reviewed. (See chart for details)    Review of labs show WBC normal, hemoglobin elevated at 18, d-dimer 109, troponin normal, BMP elevated, urine negative.  Review of her CT shows bilateral pleural effusions.    This is a 76 y.o. female that presents for shortness of breath.  The patient had congestive heart failure.  They were diuresed.  We will need to aggressively do this.  Troponin is negative.  We will get electrolytes as well as a screening EKG and  admit the patient.    12:31 AM - Patient seen and examined at bedside. Ordered DX-chest, BNP NT, CBC w/ diff, BMP, troponin and EKG.      1:25 AM - Paged Hospitalist.    1:31 AM I discussed the patient's case and the above findings with Dr. Mccray (Hospitalist) who will assess the patient for hospitalization.      The patient does have pulmonary edema.  The proBNP is pending.  The troponin is pending.  We will admit the patient to the [facility] team.    DISPOSITION:  Patient will be hospitalized by Dr. Mccray in guarded condition.    FINAL IMPRESSION  1. Acute congestive heart failure, unspecified heart failure type (HCC)       Scottie ISBELL (Jo), am scribing for, and in the presence of, Sebas Hernandez M.D..    Electronically signed by: Scottie Monae (Jo), 10/21/2022    Sebas ISBELL M.D. personally performed the services described in this documentation, as scribed by Scottie Monae in my presence, and it is both accurate and complete.     The note accurately reflects work and decisions made by me.  Sebas Hernandez M.D.  10/21/2022  3:27 AM

## 2022-10-21 NOTE — ED NOTES
Pt resting in bed, VSS on 4L NC, GCS 15, NAD, pt states improvement in SOB s/p lasix, pt aware POC to admit, waiting for inpatient bed

## 2022-10-21 NOTE — ASSESSMENT & PLAN NOTE
Uncontrolled with A1c 9.6  Sliding scale  Holding oral medication  Holding back liters on due to heart failure  Continue Jardiance and glipizide with metformin as outpatient  Patient has financial issues with Jardiance

## 2022-10-21 NOTE — PROGRESS NOTES
Hospital Medicine Daily Progress Note    Date of Service  10/21/2022    Chief Complaint  Kimberly Castañeda is a 76 y.o. female admitted 10/21/2022 with lower extremity edema    Hospital Course:    76-year-old female with history of coronary artery disease, s/p stent, hypertension dyslipidemia and chronic respiratory failure on 2 L nasal cannula who presented 10/21 with worsening shortness of breath.  Patient has had worsening shortness of breath and lower extremity edema, no chest pain no diarrhea or other symptoms.  Patient was found to have crackles with lower extremity edema.  Patient needed 4 L nasal cannula, her baseline 2 L nasal cannula.  Lasix was initiated.  Echo is pending.      Interval Problem Update  -Evaluated examined the patient at bedside, patient has significant crackles lower extremity edema  -Increase Lasix to 80 mg twice daily  -Waiting for echo result  -Continue monitoring on telemetry with monitoring electrolytes.      I have discussed this patient's plan of care and discharge plan at IDT rounds today with Case Management, Nursing, Nursing leadership, and other members of the IDT team.    Consultants/Specialty  None    Code Status  DNAR/DNI    Disposition  Patient is not medically cleared for discharge.   Anticipate discharge to to home with close outpatient follow-up.  I have placed the appropriate orders for post-discharge needs.    Review of Systems  Review of Systems   Constitutional:  Negative for chills, fever and weight loss.   HENT:  Negative for ear pain, hearing loss and tinnitus.    Eyes:  Negative for blurred vision, double vision and photophobia.   Respiratory:  Positive for shortness of breath. Negative for cough and hemoptysis.    Cardiovascular:  Positive for orthopnea and leg swelling. Negative for chest pain, palpitations and claudication.   Gastrointestinal:  Negative for abdominal pain, constipation, diarrhea, nausea and vomiting.   Genitourinary:  Negative for dysuria,  frequency and urgency.   Musculoskeletal:  Negative for myalgias and neck pain.   Skin:  Negative for rash.   Neurological:  Negative for dizziness, speech change and weakness.      Physical Exam  Temp:  [36.5 °C (97.7 °F)] 36.5 °C (97.7 °F)  Pulse:  [56-66] 56  Resp:  [16-20] 18  BP: (110-171)/(67-78) 145/67  SpO2:  [88 %-95 %] 95 %    Physical Exam  Constitutional:       General: She is not in acute distress.     Appearance: She is not ill-appearing.   Eyes:      General: No scleral icterus.  Cardiovascular:      Rate and Rhythm: Normal rate.      Heart sounds: Murmur heard.   Pulmonary:      Breath sounds: Rales present.   Abdominal:      General: Abdomen is flat. Bowel sounds are normal. There is no distension.      Palpations: Abdomen is soft.      Tenderness: There is no abdominal tenderness. There is no guarding.   Musculoskeletal:      Right lower leg: Edema present.      Left lower leg: Edema present.   Skin:     Coloration: Skin is not jaundiced or pale.      Findings: No bruising or erythema.   Neurological:      General: No focal deficit present.      Mental Status: She is alert and oriented to person, place, and time. Mental status is at baseline.      Cranial Nerves: No cranial nerve deficit.      Sensory: No sensory deficit.      Motor: No weakness.       Fluids    Intake/Output Summary (Last 24 hours) at 10/21/2022 1222  Last data filed at 10/21/2022 1156  Gross per 24 hour   Intake 100 ml   Output 4400 ml   Net -4300 ml       Laboratory  Recent Labs     10/21/22  0034   WBC 6.8   RBC 5.94*   HEMOGLOBIN 18.7*   HEMATOCRIT 57.9*   MCV 97.5   MCH 31.5   MCHC 32.3*   RDW 63.1*   PLATELETCT 375   MPV 13.1*     Recent Labs     10/21/22  0254   SODIUM 138   POTASSIUM 4.6   CHLORIDE 97   CO2 34*   GLUCOSE 272*   BUN 27*   CREATININE 0.68   CALCIUM 9.5             Recent Labs     10/21/22  0254   TRIGLYCERIDE 181*   HDL 55   LDL 40       Imaging  EC-ECHOCARDIOGRAM COMPLETE W/O CONT         DX-CHEST-PORTABLE  "(1 VIEW)   Final Result         1.  Pulmonary edema and/or infiltrates.   2.  Small bilateral pleural effusions   3.  Cardiomegaly   4.  Atherosclerosis           Assessment/Plan  * Volume overload- (present on admission)  Assessment & Plan  Likely heart failure exacerbation, no echo on her chart before.  Kidney function is good and no signs of liver failure  IV Lasix 80 mg twice daily  I's and O's and weight daily  Telemetry and monitoring electrolytes  Echo is pending and possible cardiology consult if needed    Troponin level elevated  Assessment & Plan  Not significantly elevated with no chest pain and no ischemic changes on EKG  Trending troponin is normal  Telemetry  Check echo    Polycythemia  Assessment & Plan  -Hgb 18.4 on presentation  -continue to monitor    Reactive depression- (present on admission)  Assessment & Plan  -home fluoxetine     Left breast lump  Assessment & Plan  -noted to have erythema on breast folds  -US at OSH reported \"cobblestoning\"  -nystatin powder  Mammogram as outpatient    Type 2 diabetes mellitus (HCC)- (present on admission)  Assessment & Plan  Uncontrolled with A1c 9.6  Sliding scale  Holding oral medication    Dyslipidemia- (present on admission)  Assessment & Plan  Continue simvastatin 20 mg daily  Check lipid panel as outpatient    Essential hypertension- (present on admission)  Assessment & Plan  Continue home medication metoprolol and benazepril  Close monitoring and adjust the medication if needed       VTE prophylaxis: SCDs/TEDs and enoxaparin ppx    I have performed a physical exam and reviewed and updated ROS and Plan today (10/21/2022). In review of yesterday's note (10/20/2022), there are no changes except as documented above.        "

## 2022-10-21 NOTE — H&P
[facility] Internal Medicine History & Physical Note    Date of Service  10/21/2022    [facility] Team: GAYE   Attending: Sebas Hernandez M.d.  Senior Resident: Dr. Mccray  Contact Number: [phone]    Primary Care Physician  Bud Casas M.D.    Consultants  none    Specialist Names: na    Code Status  DNAR/DNI    Chief Complaint  Chief Complaint   Patient presents with    Shortness of Breath     Pt is a transfer from [facility]. She has been experiencing SOB, ANDERSON, and BLE pitting edema for the last two weeks. She wears 2L baseline, up to 4L now. Pt had 40mg of lasix at sending facility with 2500 urine output.        History of Presenting Illness (HPI):   Kimberly Castañeda is a 76 y.o. female with PMH that includes CAD, HTN, DM2, on 2L NC baseline former smoker, notably no prior stated history of heart failure, who presented 10/21/2022 to her local hospital in [place] with a 2 week history of progressively worsening lower extremity edema as well as worsening home oxygen needs. Transferred from that outside hospital as their treatment team felt the patient may require a cath at some point in her admission.     Per the patient, 2 weeks ago her legs began to gradually swell up. She says they have never swollen in the past. She adds that during this time her oxygen needs began to gradually increase to 4L from her baseline 2L. She notes that she also now has worse breathing when laying flat when she didn't used to. She denies any recent history of chest pain or abdominal pain.     She notes that prior to all these events she weighed about 149 lbs, and that she was told she now weighed 160 lbs today.     At this outside she was given 40mg of IV lasix which resulted in 2.5L of urinary output. Reportedly had a WNL troponin at OSH.  Vitals in the ED were /71, P 64, RR 16, SPO2 94 on 3L NC.    I discussed the plan of care with patient.    Review of Systems  Review of Systems   Constitutional:  Negative for chills and fever.    Respiratory:  Positive for shortness of breath. Negative for cough, sputum production and wheezing.    Cardiovascular:  Positive for orthopnea and leg swelling. Negative for chest pain and palpitations.   Gastrointestinal:  Negative for abdominal pain, diarrhea, nausea and vomiting.   Genitourinary:  Negative for dysuria and urgency.   Skin:  Positive for rash (L breast).   Neurological:  Negative for dizziness, sensory change, loss of consciousness and headaches.   Psychiatric/Behavioral:  Negative for depression and substance abuse.      Past Medical History   has a past medical history of Abnormal mammogram (3/22/2018), Arthritis/arthropathy of multiple joints (12/12/2018), Back pain, BRBPR (bright red blood per rectum) (6/1/2017), Bronchitis, Cardiac arrhythmia, Cellulitis and abscess of hand (12/12/2018), Chickenpox, Controlled type 2 diabetes mellitus without complication ([facility]) (4/27/2017), Coronary artery disease involving native coronary artery of native heart without angina pectoris (4/27/2017), Cough, Diarrhea, Dyslipidemia (4/27/2017), Essential hypertension (4/27/2017), Exposure (9/20/2017), [place] measles, GI bleed, H/O colonoscopy with polypectomy (6/1/2017), History of recent dental procedure (10/16/2019), Hypoxemia (4/27/2017), Lipoma of torso (7/12/2018), Low hemoglobin (10/16/2019), Mumps, Obesity, PAD (peripheral artery disease) ([facility]) (4/27/2017), Painful joint, Post herpetic neuralgia (8/22/2019), Reactive depression (4/27/2017), Rhinitis, Shingles outbreak (06/2019), Sore muscles, Swelling of lower extremity, Tobacco abuse (4/27/2017), Tonsillitis, Type 2 diabetes mellitus with ophthalmic complication ([facility]) (3/22/2018), Valvular heart disease, Wears glasses, Weight loss, and Wheezing.    Surgical History   has a past surgical history that includes stent placement (2007); tubal ligation (1983); hysterectomy laparoscopy (1988); appendectomy; vein stripping; tonsillectomy; pr upper gi  endoscopy,diagnosis (N/A, 7/16/2021); pr upper gi endoscopy,biopsy (N/A, 7/16/2021); pr colonoscopy,diagnostic (N/A, 7/17/2021); pr colonoscpy,flex,w/dir submuc inject (N/A, 7/17/2021); pr colonoscopy,flex,w/control, bleeding (N/A, 7/17/2021); and colonoscopy with apc (N/A, 7/17/2021).     Family History  family history includes Alcohol/Drug in her father; Arthritis in her father; Cancer in her maternal aunt and mother; Diabetes in her brother; Heart Disease in her father.   Family history reviewed with patient.     Social History  Tobacco: Former smoker, quit 5 years ago, reports prior 1ppd for 60 years  Alcohol: Denies  Recreational drugs (illegal or prescription): Denies  Employment: Retired, prior worked as a tech in an OR  Living Situation: Lives with adult son in [place]   Recent Travel: Denies      Allergies  Allergies   Allergen Reactions    Aldactone [Kdc:Yellow Dye+Spironolactone] Palpitations    Percocet [Apap-Fd&C Red #40 [place]-Oxycodone] Rash     Full body  Patient states she can take if she also takes Benadryl       Medications  Prior to Admission Medications   Prescriptions Last Dose Informant Patient Reported? Taking?   CALCIUM PO 10/20/2022 at AM Patient Yes Yes   Sig: Take 1 Tablet by mouth every day.   Coenzyme Q10 (CO Q 10 PO) 10/20/2022 at AM Patient Yes Yes   Sig: Take 1 Tablet by mouth every day.   Empagliflozin (JARDIANCE) 25 MG Tab 10/20/2022 at AM Patient Yes Yes   Sig: Take 25 mg by mouth every day.   FLUoxetine (PROZAC) 20 MG Cap 10/20/2022 at AM Patient No No   Sig: Take 1 Cap by mouth every day.   NON SPECIFIED Supplies at Supplies Patient No No   Sig: Portable Oxygen  Dx: COPD   aspirin (ASA) 81 MG Chew Tab chewable tablet 10/19/2022 at AM Patient No No   Sig: Chew 1 tablet every day.   benazepril (LOTENSIN) 40 MG tablet 10/20/2022 at AM Patient Yes Yes   Sig: Take 40 mg by mouth every day.   glipiZIDE (GLUCOTROL) 10 MG Tab 10/20/2022 at AM Patient Yes No   Sig: Take 10 mg by mouth 2  times a day.   metFORMIN (GLUCOPHAGE) 850 MG Tab 10/20/2022 at Noon Patient Yes Yes   Sig: Take 850 mg by mouth 3 times a day with meals.   metoprolol (LOPRESSOR) 25 MG Tab 10/20/2022 at AM Patient No No   Sig: Take 1 Tab by mouth 2 times a day.   nitroglycerin (NITROSTAT) 0.4 MG SL Tab PRN at PRN Patient No No   Sig: Place 1 Tab under tongue as needed for Chest Pain.   pioglitazone (ACTOS) 45 MG Tab 10/20/2022 at AM Patient Yes No   Sig: Take 45 mg by mouth every morning.   simvastatin (ZOCOR) 20 MG Tab 10/19/2022 at PM Patient No No   Sig: Take 1 Tab by mouth every evening.      Facility-Administered Medications: None       Physical Exam  Temp:  [36.5 °C (97.7 °F)] 36.5 °C (97.7 °F)  Pulse:  [64] 64  Resp:  [16] 16  BP: (110)/(71) 110/71  SpO2:  [94 %] 94 %  Blood Pressure : 110/71   Temperature: 36.5 °C (97.7 °F)   Pulse: 64   Respiration: 16   Pulse Oximetry: 94 %       Physical Exam  Constitutional:       General: She is not in acute distress.     Appearance: Normal appearance. She is not ill-appearing.   Pulmonary:      Effort: Pulmonary effort is normal. No respiratory distress.      Breath sounds: Rales present. No wheezing.   Musculoskeletal:      Right lower leg: Edema present.      Left lower leg: Edema present.   Neurological:      Mental Status: She is alert.       Laboratory:  Recent Labs     10/21/22  0034   WBC 6.8   RBC 5.94*   HEMOGLOBIN 18.7*   HEMATOCRIT 57.9*   MCV 97.5   MCH 31.5   MCHC 32.3*   RDW 63.1*   PLATELETCT 375   MPV 13.1*     Recent Labs     10/21/22  0254   SODIUM 138   POTASSIUM 4.6   CHLORIDE 97   CO2 34*   GLUCOSE 272*   BUN 27*   CREATININE 0.68   CALCIUM 9.5     Recent Labs     10/21/22  0254   GLUCOSE 272*         Recent Labs     10/21/22  0254   NTPROBNP 6446*     Recent Labs     10/21/22  0254   TRIGLYCERIDE 181*   HDL 55   LDL 40     Recent Labs     10/21/22  0254   TROPONINT 20*       Imaging:  DX-CHEST-PORTABLE (1 VIEW)   Final Result         1.  Pulmonary edema and/or  "infiltrates.   2.  Small bilateral pleural effusions   3.  Cardiomegaly   4.  Atherosclerosis      EC-ECHOCARDIOGRAM COMPLETE W/O CONT    (Results Pending)       Assessment/Plan:  Problem Representation: 76 year old with history of CAD, 2L baseline O2 needs, DM2 who presents with volume overload concerning for new onset CHF.   I anticipate this patient will require at least two midnights for appropriate medical management, necessitating inpatient admission because of need for diuresis     Patient will need a Telemetry bed on MEDICAL service .  The need is secondary to presumed CHF exacerbation.    * Volume overload- (present on admission)  Assessment & Plan  -reports prior weight of 149lbs, 160lb on presentation  -no reported history of HF  -denies any recent chest pain  -EKG suggestive of old infarct, age indeterminate, trop at OSH wnl, low clinical concern for active ACS  PLAN  -Admit to tele  -ECHO in the AM  -40mg IV lasix BID  -TSH, A1C, Lipid panel  -daily weights  -strict I/Os  -Cardiac diet with 2g salt restrict  -was incidentally already taking metoprolol and an ACE, will continue   -depending on results of ECHO may need cardiology consult, additional HF meds    Left breast lump  Assessment & Plan  -noted to have erythema on breast folds  -US at OSH reported \"cobblestoning\"  PLAN  -nystatin powder  -outpatient followup    Dyslipidemia- (present on admission)  Assessment & Plan  -on simvastatin 20mg at home  PLAN  -lipid panel  -increasing to 40mg simvastatin    Troponin level elevated  Assessment & Plan  -troponin at 0300 mildly elevated to 20  PLAN  -repeat troponin in 6 hours    Polycythemia  Assessment & Plan  -Hgb 18.4 on presentation  -continue to monitor    Reactive depression- (present on admission)  Assessment & Plan  -home fluoxetine     Type 2 diabetes mellitus (HCC)- (present on admission)  Assessment & Plan  -holding home pioglitazone, metformin, glipizide, jardiance   PLAN  -SSI with " hypoglycemia protocols    Essential hypertension- (present on admission)  Assessment & Plan  -resumed home metoprolol  -resumed home benazepril      VTE prophylaxis: SCDs/TEDs and enoxaparin ppx

## 2022-10-21 NOTE — ASSESSMENT & PLAN NOTE
Continue home medication metoprolol and benazepril  Close monitoring and adjust the medication if needed  bp is soft, close monitoring  Holding parameter for bp meds

## 2022-10-21 NOTE — ASSESSMENT & PLAN NOTE
"-noted to have erythema on breast folds  -US at OSH reported \"cobblestoning\"  -nystatin powder  Mammogram as outpatient  "

## 2022-10-22 PROBLEM — I50.30 DIASTOLIC HEART FAILURE (HCC): Status: ACTIVE | Noted: 2022-10-21

## 2022-10-22 LAB
ALBUMIN SERPL BCP-MCNC: 3.5 G/DL (ref 3.2–4.9)
ALBUMIN/GLOB SERPL: 1.6 G/DL
ALP SERPL-CCNC: 68 U/L (ref 30–99)
ALT SERPL-CCNC: 25 U/L (ref 2–50)
ANION GAP SERPL CALC-SCNC: 9 MMOL/L (ref 7–16)
AST SERPL-CCNC: 23 U/L (ref 12–45)
BASOPHILS # BLD AUTO: 0.5 % (ref 0–1.8)
BASOPHILS # BLD: 0.03 K/UL (ref 0–0.12)
BILIRUB SERPL-MCNC: 0.8 MG/DL (ref 0.1–1.5)
BUN SERPL-MCNC: 30 MG/DL (ref 8–22)
CALCIUM SERPL-MCNC: 9.4 MG/DL (ref 8.5–10.5)
CHLORIDE SERPL-SCNC: 90 MMOL/L (ref 96–112)
CO2 SERPL-SCNC: 39 MMOL/L (ref 20–33)
CREAT SERPL-MCNC: 0.74 MG/DL (ref 0.5–1.4)
EOSINOPHIL # BLD AUTO: 0.04 K/UL (ref 0–0.51)
EOSINOPHIL NFR BLD: 0.6 % (ref 0–6.9)
ERYTHROCYTE [DISTWIDTH] IN BLOOD BY AUTOMATED COUNT: 62.5 FL (ref 35.9–50)
GFR SERPLBLD CREATININE-BSD FMLA CKD-EPI: 82 ML/MIN/1.73 M 2
GLOBULIN SER CALC-MCNC: 2.2 G/DL (ref 1.9–3.5)
GLUCOSE BLD STRIP.AUTO-MCNC: 188 MG/DL (ref 65–99)
GLUCOSE BLD STRIP.AUTO-MCNC: 294 MG/DL (ref 65–99)
GLUCOSE BLD STRIP.AUTO-MCNC: 319 MG/DL (ref 65–99)
GLUCOSE SERPL-MCNC: 100 MG/DL (ref 65–99)
HCT VFR BLD AUTO: 59.2 % (ref 37–47)
HGB BLD-MCNC: 18.8 G/DL (ref 12–16)
IMM GRANULOCYTES # BLD AUTO: 0.02 K/UL (ref 0–0.11)
IMM GRANULOCYTES NFR BLD AUTO: 0.3 % (ref 0–0.9)
LYMPHOCYTES # BLD AUTO: 1.13 K/UL (ref 1–4.8)
LYMPHOCYTES NFR BLD: 17 % (ref 22–41)
MAGNESIUM SERPL-MCNC: 1.9 MG/DL (ref 1.5–2.5)
MCH RBC QN AUTO: 30.9 PG (ref 27–33)
MCHC RBC AUTO-ENTMCNC: 31.8 G/DL (ref 33.6–35)
MCV RBC AUTO: 97.2 FL (ref 81.4–97.8)
MONOCYTES # BLD AUTO: 0.49 K/UL (ref 0–0.85)
MONOCYTES NFR BLD AUTO: 7.4 % (ref 0–13.4)
NEUTROPHILS # BLD AUTO: 4.92 K/UL (ref 2–7.15)
NEUTROPHILS NFR BLD: 74.2 % (ref 44–72)
NRBC # BLD AUTO: 0 K/UL
NRBC BLD-RTO: 0 /100 WBC
PLATELET # BLD AUTO: 124 K/UL (ref 164–446)
PMV BLD AUTO: 9.4 FL (ref 9–12.9)
POTASSIUM SERPL-SCNC: 4 MMOL/L (ref 3.6–5.5)
PROT SERPL-MCNC: 5.7 G/DL (ref 6–8.2)
RBC # BLD AUTO: 6.09 M/UL (ref 4.2–5.4)
SODIUM SERPL-SCNC: 138 MMOL/L (ref 135–145)
WBC # BLD AUTO: 6.6 K/UL (ref 4.8–10.8)

## 2022-10-22 PROCEDURE — 80053 COMPREHEN METABOLIC PANEL: CPT

## 2022-10-22 PROCEDURE — 82962 GLUCOSE BLOOD TEST: CPT | Mod: 91

## 2022-10-22 PROCEDURE — 85025 COMPLETE CBC W/AUTO DIFF WBC: CPT

## 2022-10-22 PROCEDURE — 700102 HCHG RX REV CODE 250 W/ 637 OVERRIDE(OP): Performed by: STUDENT IN AN ORGANIZED HEALTH CARE EDUCATION/TRAINING PROGRAM

## 2022-10-22 PROCEDURE — A9270 NON-COVERED ITEM OR SERVICE: HCPCS | Performed by: STUDENT IN AN ORGANIZED HEALTH CARE EDUCATION/TRAINING PROGRAM

## 2022-10-22 PROCEDURE — 83735 ASSAY OF MAGNESIUM: CPT

## 2022-10-22 PROCEDURE — 770020 HCHG ROOM/CARE - TELE (206)

## 2022-10-22 PROCEDURE — 36415 COLL VENOUS BLD VENIPUNCTURE: CPT

## 2022-10-22 PROCEDURE — 700111 HCHG RX REV CODE 636 W/ 250 OVERRIDE (IP): Performed by: STUDENT IN AN ORGANIZED HEALTH CARE EDUCATION/TRAINING PROGRAM

## 2022-10-22 PROCEDURE — 99233 SBSQ HOSP IP/OBS HIGH 50: CPT | Performed by: INTERNAL MEDICINE

## 2022-10-22 PROCEDURE — 700111 HCHG RX REV CODE 636 W/ 250 OVERRIDE (IP): Performed by: INTERNAL MEDICINE

## 2022-10-22 RX ORDER — FUROSEMIDE 10 MG/ML
60 INJECTION INTRAMUSCULAR; INTRAVENOUS
Status: DISCONTINUED | OUTPATIENT
Start: 2022-10-22 | End: 2022-10-23

## 2022-10-22 RX ORDER — BENAZEPRIL HYDROCHLORIDE 20 MG/1
20 TABLET ORAL DAILY
Status: DISCONTINUED | OUTPATIENT
Start: 2022-10-23 | End: 2022-10-25 | Stop reason: HOSPADM

## 2022-10-22 RX ADMIN — ASPIRIN 81 MG 81 MG: 81 TABLET ORAL at 06:29

## 2022-10-22 RX ADMIN — INSULIN HUMAN 2 UNITS: 100 INJECTION, SOLUTION PARENTERAL at 21:25

## 2022-10-22 RX ADMIN — SENNOSIDES AND DOCUSATE SODIUM 2 TABLET: 50; 8.6 TABLET ORAL at 06:28

## 2022-10-22 RX ADMIN — NYSTATIN: 100000 POWDER TOPICAL at 18:11

## 2022-10-22 RX ADMIN — NYSTATIN: 100000 POWDER TOPICAL at 06:41

## 2022-10-22 RX ADMIN — SENNOSIDES AND DOCUSATE SODIUM 2 TABLET: 50; 8.6 TABLET ORAL at 17:59

## 2022-10-22 RX ADMIN — INSULIN HUMAN 5 UNITS: 100 INJECTION, SOLUTION PARENTERAL at 18:00

## 2022-10-22 RX ADMIN — INSULIN HUMAN 2 UNITS: 100 INJECTION, SOLUTION PARENTERAL at 06:32

## 2022-10-22 RX ADMIN — FUROSEMIDE 80 MG: 10 INJECTION, SOLUTION INTRAMUSCULAR; INTRAVENOUS at 06:28

## 2022-10-22 RX ADMIN — FLUOXETINE HYDROCHLORIDE 20 MG: 20 CAPSULE ORAL at 06:29

## 2022-10-22 RX ADMIN — ENOXAPARIN SODIUM 40 MG: 40 INJECTION SUBCUTANEOUS at 18:00

## 2022-10-22 RX ADMIN — INSULIN HUMAN 6 UNITS: 100 INJECTION, SOLUTION PARENTERAL at 12:17

## 2022-10-22 RX ADMIN — SIMVASTATIN 40 MG: 40 TABLET, FILM COATED ORAL at 17:59

## 2022-10-22 RX ADMIN — FUROSEMIDE 60 MG: 10 INJECTION, SOLUTION INTRAMUSCULAR; INTRAVENOUS at 18:00

## 2022-10-22 ASSESSMENT — ENCOUNTER SYMPTOMS
FEVER: 0
DIZZINESS: 0
ORTHOPNEA: 1
WEAKNESS: 0
BLURRED VISION: 0
HEMOPTYSIS: 0
NECK PAIN: 0
DIARRHEA: 0
PHOTOPHOBIA: 0
ABDOMINAL PAIN: 0
CLAUDICATION: 0
COUGH: 0
CONSTIPATION: 0
PALPITATIONS: 0
SPEECH CHANGE: 0
VOMITING: 0
NAUSEA: 0
CHILLS: 0
DOUBLE VISION: 0
WEIGHT LOSS: 0
SHORTNESS OF BREATH: 1
MYALGIAS: 0

## 2022-10-22 ASSESSMENT — FIBROSIS 4 INDEX: FIB4 SCORE: 2.89

## 2022-10-22 ASSESSMENT — PAIN DESCRIPTION - PAIN TYPE: TYPE: ACUTE PAIN

## 2022-10-22 NOTE — CARE PLAN
The patient is Stable - Low risk of patient condition declining or worsening    Shift Goals  Clinical Goals: diurese; strict I/Os  Patient Goals: get some rest    Progress made toward(s) clinical / shift goals:        Problem: Care Map:  Admission Optimal Outcome for the Heart Failure Patient  Goal: Admission:  Optimal Care of the heart failure patient  Outcome: Progressing     Problem: Care Map:  Day 1 Optimal Outcome for the Heart Failure Patient  Goal: Day 1:  Optimal Care of the heart failure patient  Outcome: Progressing     Problem: Knowledge Deficit - Standard  Goal: Patient and family/care givers will demonstrate understanding of plan of care, disease process/condition, diagnostic tests and medications  Outcome: Progressing     Problem: Fall Risk  Goal: Patient will remain free from falls  Outcome: Progressing       Patient is not progressing towards the following goals:

## 2022-10-22 NOTE — PROGRESS NOTES
Hospital Medicine Daily Progress Note    Date of Service  10/22/2022    Chief Complaint  Kimberly Castañeda is a 76 y.o. female admitted 10/21/2022 with lower extremity edema    Hospital Course:    76-year-old female with history of coronary artery disease, s/p stent, hypertension dyslipidemia and chronic respiratory failure on 2 L nasal cannula who presented 10/21 with worsening shortness of breath.  Patient has had worsening shortness of breath and lower extremity edema, no chest pain no diarrhea or other symptoms.  Patient was found to have crackles with lower extremity edema.  Patient needed 4 L nasal cannula, her baseline 2 L nasal cannula.  Lasix was initiated.  Echo showed normal ejection fraction 55% however patient has diastolic dysfunction grade 2 with dilated left and right atrium, with reduced right heart failure, patient does not have history of clots and no history of COPD or sleep apnea.  Significant improving on her crackles and lower extremity edema after Lasix.  Patient might need pulmonary function test and possible study for chronic pulmonary embolism as outpatient.    Patient has history of diabetes and A1c 9.6, according to the patient and her son her blood sugar has been high, patient refused insulin, we will adjust her medication on discharge.        Interval Problem Update  -Evaluated examined the patient at bedside, improving on her crackles and lower extremity edema however patient still needs more than her baseline oxygen especially on exertion.  -Echo showed diastolic dysfunction with normal ejection fraction  -Her blood pressure low normal, decrease metoprolol and bisoprolol, decrease Lasix to 60 mg IV twice daily  -The case was discussed in detail with the patient and her son, answered all their question, discussed all labs and images results, discussed the plan of care, they understood and agreed, the concern about high blood sugar, patient cannot pay for Jardiance.      I have discussed  this patient's plan of care and discharge plan at IDT rounds today with Case Management, Nursing, Nursing leadership, and other members of the IDT team.    Consultants/Specialty  None    Code Status  DNAR/DNI    Disposition  Patient is not medically cleared for discharge.   Patient needs more IV Lasix for crackles and oxygen needs, possible discharge on 10/23 if she is on her baseline oxygen requirement.  Anticipate discharge to to home with close outpatient follow-up.  I have placed the appropriate orders for post-discharge needs.    Review of Systems  Review of Systems   Constitutional:  Negative for chills, fever and weight loss.   HENT:  Negative for ear pain, hearing loss and tinnitus.    Eyes:  Negative for blurred vision, double vision and photophobia.   Respiratory:  Positive for shortness of breath. Negative for cough and hemoptysis.    Cardiovascular:  Positive for orthopnea and leg swelling. Negative for chest pain, palpitations and claudication.   Gastrointestinal:  Negative for abdominal pain, constipation, diarrhea, nausea and vomiting.   Genitourinary:  Negative for dysuria, frequency and urgency.   Musculoskeletal:  Negative for myalgias and neck pain.   Skin:  Negative for rash.   Neurological:  Negative for dizziness, speech change and weakness.      Physical Exam  Temp:  [36.5 °C (97.7 °F)-37 °C (98.6 °F)] 36.7 °C (98.1 °F)  Pulse:  [62-76] 76  Resp:  [12-18] 16  BP: ()/(59-67) 90/59  SpO2:  [87 %-95 %] 91 %    Physical Exam  Constitutional:       General: She is not in acute distress.     Appearance: She is not ill-appearing.   Eyes:      General: No scleral icterus.  Cardiovascular:      Rate and Rhythm: Normal rate.      Heart sounds: Murmur heard.   Pulmonary:      Breath sounds: Rales present.   Abdominal:      General: Abdomen is flat. Bowel sounds are normal. There is no distension.      Palpations: Abdomen is soft.      Tenderness: There is no abdominal tenderness. There is no  guarding.   Musculoskeletal:      Right lower leg: Edema present.      Left lower leg: Edema present.   Skin:     Coloration: Skin is not jaundiced or pale.      Findings: No bruising or erythema.   Neurological:      General: No focal deficit present.      Mental Status: She is alert and oriented to person, place, and time. Mental status is at baseline.      Cranial Nerves: No cranial nerve deficit.      Sensory: No sensory deficit.      Motor: No weakness.       Fluids    Intake/Output Summary (Last 24 hours) at 10/22/2022 1209  Last data filed at 10/21/2022 2200  Gross per 24 hour   Intake 300 ml   Output 1400 ml   Net -1100 ml       Laboratory  Recent Labs     10/21/22  0034 10/22/22  0322   WBC 6.8 6.6   RBC 5.94* 6.09*   HEMOGLOBIN 18.7* 18.8*   HEMATOCRIT 57.9* 59.2*   MCV 97.5 97.2   MCH 31.5 30.9   MCHC 32.3* 31.8*   RDW 63.1* 62.5*   PLATELETCT 375 124*   MPV 13.1* 9.4     Recent Labs     10/21/22  0254 10/21/22  1459 10/22/22  0025   SODIUM 138 139 138   POTASSIUM 4.6 4.0 4.0   CHLORIDE 97 91* 90*   CO2 34* 38* 39*   GLUCOSE 272* 365* 100*   BUN 27* 26* 30*   CREATININE 0.68 0.99 0.74   CALCIUM 9.5 9.4 9.4             Recent Labs     10/21/22  0254   TRIGLYCERIDE 181*   HDL 55   LDL 40       Imaging  EC-ECHOCARDIOGRAM COMPLETE W/O CONT   Final Result      DX-CHEST-PORTABLE (1 VIEW)   Final Result         1.  Pulmonary edema and/or infiltrates.   2.  Small bilateral pleural effusions   3.  Cardiomegaly   4.  Atherosclerosis           Assessment/Plan  * Diastolic heart failure (HCC)- (present on admission)  Assessment & Plan  Came with crackles and lower extremity edema  Echo showed grade 2 diastolic dysfunction with reduced on right ventricle function, ejection fraction 55%  Improving on her symptoms with Lasix, continue 60 mg twice daily  I's and O's and weight daily  Telemetry and monitoring electrolytes  Patient will need studies for pulmonary function test and PE as outpatient.    Troponin level  "elevated  Assessment & Plan  Not significantly elevated with no chest pain and no ischemic changes on EKG  Trending troponin is normal  Telemetry  Check echo    Polycythemia  Assessment & Plan  -Hgb 18.4 on presentation  -continue to monitor    Reactive depression- (present on admission)  Assessment & Plan  -home fluoxetine     Left breast lump  Assessment & Plan  -noted to have erythema on breast folds  -US at OSH reported \"cobblestoning\"  -nystatin powder  Mammogram as outpatient    Type 2 diabetes mellitus (HCC)- (present on admission)  Assessment & Plan  Uncontrolled with A1c 9.6  Sliding scale  Holding oral medication  Holding back liters on due to heart failure  Continue Jardiance and glipizide with metformin as outpatient  Patient has financial issues with Jardiance    Dyslipidemia- (present on admission)  Assessment & Plan  Continue simvastatin 20 mg daily  Check lipid panel as outpatient    Essential hypertension- (present on admission)  Assessment & Plan  Continue home medication metoprolol and benazepril  Close monitoring and adjust the medication if needed       VTE prophylaxis: SCDs/TEDs and enoxaparin ppx    I have performed a physical exam and reviewed and updated ROS and Plan today (10/22/2022). In review of yesterday's note (10/21/2022), there are no changes except as documented above.        "

## 2022-10-22 NOTE — CARE PLAN
Problem: Care Map:  Day 3 Optimal Outcome for the Heart Failure Patient  Goal: Day 3:  Optimal Care of the heart failure patient  Outcome: Progressing     Problem: Care Map:  Day Before Discharge Optimal Outcome for the Heart Failure Patient  Goal: Day Before Discharge:  Optimal Care of the heart failure patient  Outcome: Progressing     Problem: Care Map:  Day of Discharge Optimal Outcome for the Heart Failure Patient  Goal: Day of Discharge:  Optimal Care of the heart failure patient  Outcome: Progressing     Problem: Knowledge Deficit - Standard  Goal: Patient and family/care givers will demonstrate understanding of plan of care, disease process/condition, diagnostic tests and medications  Outcome: Progressing     Problem: Fall Risk  Goal: Patient will remain free from falls  Outcome: Progressing   The patient is Stable - Low risk of patient condition declining or worsening    Shift Goals  Clinical Goals: diurese; strict I/Os  Patient Goals: get some rest

## 2022-10-23 ENCOUNTER — APPOINTMENT (OUTPATIENT)
Dept: RADIOLOGY | Facility: MEDICAL CENTER | Age: 77
DRG: 291 | End: 2022-10-23
Attending: HOSPITALIST
Payer: MEDICARE

## 2022-10-23 LAB
ANION GAP SERPL CALC-SCNC: 9 MMOL/L (ref 7–16)
BASOPHILS # BLD AUTO: 0.3 % (ref 0–1.8)
BASOPHILS # BLD: 0.02 K/UL (ref 0–0.12)
BUN SERPL-MCNC: 27 MG/DL (ref 8–22)
CALCIUM SERPL-MCNC: 9.6 MG/DL (ref 8.5–10.5)
CHLORIDE SERPL-SCNC: 87 MMOL/L (ref 96–112)
CHLORIDE UR-SCNC: 71 MMOL/L
CO2 SERPL-SCNC: 41 MMOL/L (ref 20–33)
CREAT SERPL-MCNC: 0.54 MG/DL (ref 0.5–1.4)
D DIMER PPP IA.FEU-MCNC: 0.34 UG/ML (FEU) (ref 0–0.5)
EOSINOPHIL # BLD AUTO: 0.02 K/UL (ref 0–0.51)
EOSINOPHIL NFR BLD: 0.3 % (ref 0–6.9)
ERYTHROCYTE [DISTWIDTH] IN BLOOD BY AUTOMATED COUNT: 60.2 FL (ref 35.9–50)
GFR SERPLBLD CREATININE-BSD FMLA CKD-EPI: 94 ML/MIN/1.73 M 2
GLUCOSE BLD STRIP.AUTO-MCNC: 194 MG/DL (ref 65–99)
GLUCOSE BLD STRIP.AUTO-MCNC: 229 MG/DL (ref 65–99)
GLUCOSE BLD STRIP.AUTO-MCNC: 262 MG/DL (ref 65–99)
GLUCOSE BLD STRIP.AUTO-MCNC: 315 MG/DL (ref 65–99)
GLUCOSE SERPL-MCNC: 97 MG/DL (ref 65–99)
HCT VFR BLD AUTO: 58.9 % (ref 37–47)
HGB BLD-MCNC: 18.8 G/DL (ref 12–16)
IMM GRANULOCYTES # BLD AUTO: 0.01 K/UL (ref 0–0.11)
IMM GRANULOCYTES NFR BLD AUTO: 0.1 % (ref 0–0.9)
LYMPHOCYTES # BLD AUTO: 0.58 K/UL (ref 1–4.8)
LYMPHOCYTES NFR BLD: 8.4 % (ref 22–41)
MAGNESIUM SERPL-MCNC: 1.6 MG/DL (ref 1.5–2.5)
MCH RBC QN AUTO: 30.4 PG (ref 27–33)
MCHC RBC AUTO-ENTMCNC: 31.9 G/DL (ref 33.6–35)
MCV RBC AUTO: 95.3 FL (ref 81.4–97.8)
MONOCYTES # BLD AUTO: 0.52 K/UL (ref 0–0.85)
MONOCYTES NFR BLD AUTO: 7.6 % (ref 0–13.4)
NEUTROPHILS # BLD AUTO: 5.72 K/UL (ref 2–7.15)
NEUTROPHILS NFR BLD: 83.3 % (ref 44–72)
NRBC # BLD AUTO: 0 K/UL
NRBC BLD-RTO: 0 /100 WBC
PLATELET # BLD AUTO: 142 K/UL (ref 164–446)
PMV BLD AUTO: 10.2 FL (ref 9–12.9)
POTASSIUM SERPL-SCNC: 3.6 MMOL/L (ref 3.6–5.5)
POTASSIUM UR-SCNC: 18 MMOL/L
RBC # BLD AUTO: 6.18 M/UL (ref 4.2–5.4)
SODIUM SERPL-SCNC: 137 MMOL/L (ref 135–145)
WBC # BLD AUTO: 6.9 K/UL (ref 4.8–10.8)

## 2022-10-23 PROCEDURE — 700102 HCHG RX REV CODE 250 W/ 637 OVERRIDE(OP): Performed by: STUDENT IN AN ORGANIZED HEALTH CARE EDUCATION/TRAINING PROGRAM

## 2022-10-23 PROCEDURE — 84133 ASSAY OF URINE POTASSIUM: CPT

## 2022-10-23 PROCEDURE — A9270 NON-COVERED ITEM OR SERVICE: HCPCS | Performed by: STUDENT IN AN ORGANIZED HEALTH CARE EDUCATION/TRAINING PROGRAM

## 2022-10-23 PROCEDURE — 700111 HCHG RX REV CODE 636 W/ 250 OVERRIDE (IP): Performed by: STUDENT IN AN ORGANIZED HEALTH CARE EDUCATION/TRAINING PROGRAM

## 2022-10-23 PROCEDURE — 82436 ASSAY OF URINE CHLORIDE: CPT

## 2022-10-23 PROCEDURE — 700102 HCHG RX REV CODE 250 W/ 637 OVERRIDE(OP): Performed by: INTERNAL MEDICINE

## 2022-10-23 PROCEDURE — 80048 BASIC METABOLIC PNL TOTAL CA: CPT

## 2022-10-23 PROCEDURE — 83735 ASSAY OF MAGNESIUM: CPT

## 2022-10-23 PROCEDURE — 700111 HCHG RX REV CODE 636 W/ 250 OVERRIDE (IP)

## 2022-10-23 PROCEDURE — 71045 X-RAY EXAM CHEST 1 VIEW: CPT

## 2022-10-23 PROCEDURE — 85379 FIBRIN DEGRADATION QUANT: CPT

## 2022-10-23 PROCEDURE — 99233 SBSQ HOSP IP/OBS HIGH 50: CPT | Performed by: HOSPITALIST

## 2022-10-23 PROCEDURE — 770020 HCHG ROOM/CARE - TELE (206)

## 2022-10-23 PROCEDURE — 36415 COLL VENOUS BLD VENIPUNCTURE: CPT

## 2022-10-23 PROCEDURE — 82962 GLUCOSE BLOOD TEST: CPT

## 2022-10-23 PROCEDURE — A9270 NON-COVERED ITEM OR SERVICE: HCPCS | Performed by: INTERNAL MEDICINE

## 2022-10-23 PROCEDURE — 85025 COMPLETE CBC W/AUTO DIFF WBC: CPT

## 2022-10-23 RX ORDER — FUROSEMIDE 10 MG/ML
40 INJECTION INTRAMUSCULAR; INTRAVENOUS
Status: DISCONTINUED | OUTPATIENT
Start: 2022-10-23 | End: 2022-10-23

## 2022-10-23 RX ORDER — POTASSIUM CHLORIDE 20 MEQ/1
20 TABLET, EXTENDED RELEASE ORAL DAILY
Status: DISCONTINUED | OUTPATIENT
Start: 2022-10-24 | End: 2022-10-25 | Stop reason: HOSPADM

## 2022-10-23 RX ORDER — FUROSEMIDE 20 MG/1
20 TABLET ORAL
Status: DISCONTINUED | OUTPATIENT
Start: 2022-10-24 | End: 2022-10-25 | Stop reason: HOSPADM

## 2022-10-23 RX ADMIN — NYSTATIN: 100000 POWDER TOPICAL at 06:18

## 2022-10-23 RX ADMIN — ASPIRIN 81 MG 81 MG: 81 TABLET ORAL at 06:14

## 2022-10-23 RX ADMIN — FLUOXETINE HYDROCHLORIDE 20 MG: 20 CAPSULE ORAL at 06:14

## 2022-10-23 RX ADMIN — INSULIN HUMAN 3 UNITS: 100 INJECTION, SOLUTION PARENTERAL at 16:23

## 2022-10-23 RX ADMIN — SENNOSIDES AND DOCUSATE SODIUM 2 TABLET: 50; 8.6 TABLET ORAL at 06:14

## 2022-10-23 RX ADMIN — INSULIN HUMAN 2 UNITS: 100 INJECTION, SOLUTION PARENTERAL at 06:19

## 2022-10-23 RX ADMIN — FUROSEMIDE 40 MG: 10 INJECTION, SOLUTION INTRAMUSCULAR; INTRAVENOUS at 06:13

## 2022-10-23 RX ADMIN — SIMVASTATIN 40 MG: 40 TABLET, FILM COATED ORAL at 16:53

## 2022-10-23 RX ADMIN — METOPROLOL TARTRATE 12.5 MG: 25 TABLET, FILM COATED ORAL at 16:53

## 2022-10-23 RX ADMIN — INSULIN HUMAN 5 UNITS: 100 INJECTION, SOLUTION PARENTERAL at 22:07

## 2022-10-23 RX ADMIN — INSULIN HUMAN 6 UNITS: 100 INJECTION, SOLUTION PARENTERAL at 11:29

## 2022-10-23 RX ADMIN — ENOXAPARIN SODIUM 40 MG: 40 INJECTION SUBCUTANEOUS at 16:53

## 2022-10-23 RX ADMIN — BENAZEPRIL HYDROCHLORIDE 20 MG: 20 TABLET ORAL at 06:19

## 2022-10-23 ASSESSMENT — ENCOUNTER SYMPTOMS
NECK PAIN: 0
SPEECH CHANGE: 0
DIARRHEA: 0
VOMITING: 0
ABDOMINAL PAIN: 0
DOUBLE VISION: 0
FEVER: 0
DIZZINESS: 0
CLAUDICATION: 0
COUGH: 0
PALPITATIONS: 0
HEMOPTYSIS: 0
BLURRED VISION: 0
CONSTIPATION: 0
MYALGIAS: 0
WEIGHT LOSS: 0
WEAKNESS: 0
CHILLS: 0
PHOTOPHOBIA: 0
NAUSEA: 0
ORTHOPNEA: 1
SHORTNESS OF BREATH: 1

## 2022-10-23 ASSESSMENT — PAIN DESCRIPTION - PAIN TYPE
TYPE: ACUTE PAIN
TYPE: ACUTE PAIN

## 2022-10-23 ASSESSMENT — FIBROSIS 4 INDEX: FIB4 SCORE: 2.53

## 2022-10-23 NOTE — CARE PLAN
The patient is Stable - Low risk of patient condition declining or worsening    Shift Goals  Clinical Goals: Diurese, I/Os  Patient Goals: Rest  Family Goals: DARIA    Progress made toward(s) clinical / shift goals:      Problem: Care Map:  Admission Optimal Outcome for the Heart Failure Patient  Goal: Admission:  Optimal Care of the heart failure patient  Outcome: Progressing     Problem: Care Map:  Day 1 Optimal Outcome for the Heart Failure Patient  Goal: Day 1:  Optimal Care of the heart failure patient  Outcome: Progressing     Problem: Care Map:  Day 2 Optimal Outcome for the Heart Failure Patient  Goal: Day 2:  Optimal Care of the heart failure patient  Outcome: Progressing     Problem: Care Map:  Day 3 Optimal Outcome for the Heart Failure Patient  Goal: Day 3:  Optimal Care of the heart failure patient  Outcome: Progressing     Problem: Care Map:  Day Before Discharge Optimal Outcome for the Heart Failure Patient  Goal: Day Before Discharge:  Optimal Care of the heart failure patient  Outcome: Progressing     Problem: Care Map:  Day of Discharge Optimal Outcome for the Heart Failure Patient  Goal: Day of Discharge:  Optimal Care of the heart failure patient  Outcome: Progressing     Problem: Knowledge Deficit - Standard  Goal: Patient and family/care givers will demonstrate understanding of plan of care, disease process/condition, diagnostic tests and medications  Outcome: Progressing     Problem: Fall Risk  Goal: Patient will remain free from falls  Outcome: Progressing

## 2022-10-23 NOTE — CARE PLAN
The patient is Stable - Low risk of patient condition declining or worsening    Shift Goals  Clinical Goals: diurese; I/Os  Patient Goals: rest    Progress made toward(s) clinical / shift goals:      Problem: Care Map:  Day 2 Optimal Outcome for the Heart Failure Patient  Goal: Day 2:  Optimal Care of the heart failure patient  Outcome: Progressing     Problem: Knowledge Deficit - Standard  Goal: Patient and family/care givers will demonstrate understanding of plan of care, disease process/condition, diagnostic tests and medications  Outcome: Progressing     Problem: Fall Risk  Goal: Patient will remain free from falls  Outcome: Progressing       Patient is not progressing towards the following goals:

## 2022-10-23 NOTE — PROGRESS NOTES
Bedside report received from day shift RN. Assumed care at 1900. Pt is A&Ox4. Pt is in bed. Patient is on 5L via nasal cannula. Pt was updated on plan of care. Pt has call light, personal belongings, and bedside table within reach. Bed locked and in lowest position.

## 2022-10-24 ENCOUNTER — APPOINTMENT (OUTPATIENT)
Dept: RADIOLOGY | Facility: MEDICAL CENTER | Age: 77
DRG: 291 | End: 2022-10-24
Attending: HOSPITALIST
Payer: MEDICARE

## 2022-10-24 ENCOUNTER — PATIENT OUTREACH (OUTPATIENT)
Dept: SCHEDULING | Facility: IMAGING CENTER | Age: 77
End: 2022-10-24
Payer: MEDICARE

## 2022-10-24 PROBLEM — M25.572 ACUTE LEFT ANKLE PAIN: Status: ACTIVE | Noted: 2022-10-24

## 2022-10-24 PROBLEM — J96.21 ACUTE ON CHRONIC RESPIRATORY FAILURE WITH HYPOXIA (HCC): Status: ACTIVE | Noted: 2022-10-24

## 2022-10-24 LAB
ANION GAP SERPL CALC-SCNC: 6 MMOL/L (ref 7–16)
BUN SERPL-MCNC: 26 MG/DL (ref 8–22)
CALCIUM SERPL-MCNC: 9.1 MG/DL (ref 8.5–10.5)
CHLORIDE SERPL-SCNC: 90 MMOL/L (ref 96–112)
CO2 SERPL-SCNC: 39 MMOL/L (ref 20–33)
CREAT SERPL-MCNC: 0.48 MG/DL (ref 0.5–1.4)
GFR SERPLBLD CREATININE-BSD FMLA CKD-EPI: 96 ML/MIN/1.73 M 2
GLUCOSE BLD STRIP.AUTO-MCNC: 163 MG/DL (ref 65–99)
GLUCOSE BLD STRIP.AUTO-MCNC: 176 MG/DL (ref 65–99)
GLUCOSE BLD STRIP.AUTO-MCNC: 212 MG/DL (ref 65–99)
GLUCOSE BLD STRIP.AUTO-MCNC: 239 MG/DL (ref 65–99)
GLUCOSE SERPL-MCNC: 119 MG/DL (ref 65–99)
POTASSIUM SERPL-SCNC: 3.6 MMOL/L (ref 3.6–5.5)
PROCALCITONIN SERPL-MCNC: 0.25 NG/ML
PROCALCITONIN SERPL-MCNC: 0.28 NG/ML
SODIUM SERPL-SCNC: 135 MMOL/L (ref 135–145)

## 2022-10-24 PROCEDURE — A9270 NON-COVERED ITEM OR SERVICE: HCPCS | Performed by: HOSPITALIST

## 2022-10-24 PROCEDURE — 82962 GLUCOSE BLOOD TEST: CPT | Mod: 91

## 2022-10-24 PROCEDURE — 93971 EXTREMITY STUDY: CPT | Mod: LT

## 2022-10-24 PROCEDURE — 3E0234Z INTRODUCTION OF SERUM, TOXOID AND VACCINE INTO MUSCLE, PERCUTANEOUS APPROACH: ICD-10-PCS | Performed by: HOSPITALIST

## 2022-10-24 PROCEDURE — 84145 PROCALCITONIN (PCT): CPT | Mod: 91

## 2022-10-24 PROCEDURE — 90662 IIV NO PRSV INCREASED AG IM: CPT | Performed by: HOSPITALIST

## 2022-10-24 PROCEDURE — 700102 HCHG RX REV CODE 250 W/ 637 OVERRIDE(OP): Performed by: HOSPITALIST

## 2022-10-24 PROCEDURE — 700102 HCHG RX REV CODE 250 W/ 637 OVERRIDE(OP): Performed by: INTERNAL MEDICINE

## 2022-10-24 PROCEDURE — 770020 HCHG ROOM/CARE - TELE (206)

## 2022-10-24 PROCEDURE — 700102 HCHG RX REV CODE 250 W/ 637 OVERRIDE(OP): Performed by: STUDENT IN AN ORGANIZED HEALTH CARE EDUCATION/TRAINING PROGRAM

## 2022-10-24 PROCEDURE — 93971 EXTREMITY STUDY: CPT | Mod: 26,LT | Performed by: INTERNAL MEDICINE

## 2022-10-24 PROCEDURE — 99232 SBSQ HOSP IP/OBS MODERATE 35: CPT | Performed by: HOSPITALIST

## 2022-10-24 PROCEDURE — 90471 IMMUNIZATION ADMIN: CPT

## 2022-10-24 PROCEDURE — 90677 PCV20 VACCINE IM: CPT | Performed by: HOSPITALIST

## 2022-10-24 PROCEDURE — A9270 NON-COVERED ITEM OR SERVICE: HCPCS | Performed by: STUDENT IN AN ORGANIZED HEALTH CARE EDUCATION/TRAINING PROGRAM

## 2022-10-24 PROCEDURE — 3E02340 INTRODUCTION OF INFLUENZA VACCINE INTO MUSCLE, PERCUTANEOUS APPROACH: ICD-10-PCS | Performed by: HOSPITALIST

## 2022-10-24 PROCEDURE — A9270 NON-COVERED ITEM OR SERVICE: HCPCS | Performed by: INTERNAL MEDICINE

## 2022-10-24 PROCEDURE — 73610 X-RAY EXAM OF ANKLE: CPT | Mod: LT

## 2022-10-24 PROCEDURE — 700111 HCHG RX REV CODE 636 W/ 250 OVERRIDE (IP): Performed by: HOSPITALIST

## 2022-10-24 PROCEDURE — 80048 BASIC METABOLIC PNL TOTAL CA: CPT

## 2022-10-24 PROCEDURE — 700111 HCHG RX REV CODE 636 W/ 250 OVERRIDE (IP): Performed by: STUDENT IN AN ORGANIZED HEALTH CARE EDUCATION/TRAINING PROGRAM

## 2022-10-24 RX ORDER — AMOXICILLIN AND CLAVULANATE POTASSIUM 875; 125 MG/1; MG/1
1 TABLET, FILM COATED ORAL EVERY 12 HOURS
Status: DISCONTINUED | OUTPATIENT
Start: 2022-10-24 | End: 2022-10-25 | Stop reason: HOSPADM

## 2022-10-24 RX ADMIN — INSULIN HUMAN 2 UNITS: 100 INJECTION, SOLUTION PARENTERAL at 06:06

## 2022-10-24 RX ADMIN — INSULIN HUMAN 3 UNITS: 100 INJECTION, SOLUTION PARENTERAL at 16:29

## 2022-10-24 RX ADMIN — SENNOSIDES AND DOCUSATE SODIUM 2 TABLET: 50; 8.6 TABLET ORAL at 16:30

## 2022-10-24 RX ADMIN — INSULIN HUMAN 3 UNITS: 100 INJECTION, SOLUTION PARENTERAL at 11:54

## 2022-10-24 RX ADMIN — ACETAMINOPHEN 650 MG: 325 TABLET, FILM COATED ORAL at 21:14

## 2022-10-24 RX ADMIN — METOPROLOL TARTRATE 25 MG: 25 TABLET, FILM COATED ORAL at 06:02

## 2022-10-24 RX ADMIN — SIMVASTATIN 40 MG: 40 TABLET, FILM COATED ORAL at 16:31

## 2022-10-24 RX ADMIN — INFLUENZA A VIRUS A/VICTORIA/2570/2019 IVR-215 (H1N1) ANTIGEN (FORMALDEHYDE INACTIVATED), INFLUENZA A VIRUS A/DARWIN/9/2021 SAN-010 (H3N2) ANTIGEN (FORMALDEHYDE INACTIVATED), INFLUENZA B VIRUS B/PHUKET/3073/2013 ANTIGEN (FORMALDEHYDE INACTIVATED), AND INFLUENZA B VIRUS B/MICHIGAN/01/2021 ANTIGEN (FORMALDEHYDE INACTIVATED) 0.7 ML: 60; 60; 60; 60 INJECTION, SUSPENSION INTRAMUSCULAR at 12:08

## 2022-10-24 RX ADMIN — ASPIRIN 81 MG 81 MG: 81 TABLET ORAL at 06:02

## 2022-10-24 RX ADMIN — INSULIN GLARGINE-YFGN 5 UNITS: 100 INJECTION, SOLUTION SUBCUTANEOUS at 11:54

## 2022-10-24 RX ADMIN — METOPROLOL TARTRATE 25 MG: 25 TABLET, FILM COATED ORAL at 16:31

## 2022-10-24 RX ADMIN — POTASSIUM CHLORIDE 20 MEQ: 1500 TABLET, EXTENDED RELEASE ORAL at 06:02

## 2022-10-24 RX ADMIN — INSULIN HUMAN 2 UNITS: 100 INJECTION, SOLUTION PARENTERAL at 21:18

## 2022-10-24 RX ADMIN — PNEUMOCOCCAL 20-VALENT CONJUGATE VACCINE 0.5 ML
2.2; 2.2; 2.2; 2.2; 2.2; 2.2; 2.2; 2.2; 2.2; 2.2; 2.2; 2.2; 2.2; 2.2; 2.2; 2.2; 4.4; 2.2; 2.2; 2.2 INJECTION, SUSPENSION INTRAMUSCULAR at 12:16

## 2022-10-24 RX ADMIN — ACETAMINOPHEN 650 MG: 325 TABLET, FILM COATED ORAL at 08:06

## 2022-10-24 RX ADMIN — FUROSEMIDE 20 MG: 20 TABLET ORAL at 06:01

## 2022-10-24 RX ADMIN — ENOXAPARIN SODIUM 40 MG: 40 INJECTION SUBCUTANEOUS at 16:31

## 2022-10-24 RX ADMIN — FLUOXETINE HYDROCHLORIDE 20 MG: 20 CAPSULE ORAL at 06:02

## 2022-10-24 RX ADMIN — BENAZEPRIL HYDROCHLORIDE 20 MG: 20 TABLET ORAL at 06:02

## 2022-10-24 RX ADMIN — AMOXICILLIN AND CLAVULANATE POTASSIUM 1 TABLET: 875; 125 TABLET, FILM COATED ORAL at 16:30

## 2022-10-24 ASSESSMENT — ENCOUNTER SYMPTOMS
DIZZINESS: 0
CONSTIPATION: 0
BLURRED VISION: 0
PALPITATIONS: 0
SPEECH CHANGE: 0
NAUSEA: 0
VOMITING: 0
DOUBLE VISION: 0
ORTHOPNEA: 1
PHOTOPHOBIA: 0
MYALGIAS: 0
FEVER: 0
HEMOPTYSIS: 0
CHILLS: 0
NECK PAIN: 0
WEAKNESS: 0
SHORTNESS OF BREATH: 1
DIARRHEA: 0
CLAUDICATION: 0
COUGH: 0
ABDOMINAL PAIN: 0

## 2022-10-24 ASSESSMENT — FIBROSIS 4 INDEX: FIB4 SCORE: 2.53

## 2022-10-24 ASSESSMENT — PAIN DESCRIPTION - PAIN TYPE
TYPE: ACUTE PAIN
TYPE: ACUTE PAIN

## 2022-10-24 NOTE — ASSESSMENT & PLAN NOTE
Continue gently diuresis  procal is elevated will start po abx.   Continue monitoring.   On 4L her baseline is 2L.

## 2022-10-24 NOTE — PROGRESS NOTES
Hospital Medicine Daily Progress Note    Date of Service  10/24/2022    Chief Complaint  Kimberly Castañeda is a 76 y.o. female admitted 10/21/2022 with lower extremity edema    Hospital Course:    76-year-old female with history of coronary artery disease, s/p stent, hypertension dyslipidemia and chronic respiratory failure on 2 L nasal cannula who presented 10/21 with worsening shortness of breath.  Patient has had worsening shortness of breath and lower extremity edema, no chest pain no diarrhea or other symptoms.  Patient was found to have crackles with lower extremity edema.  Patient needed 4 L nasal cannula, her baseline 2 L nasal cannula.  Lasix was initiated.  Echo showed normal ejection fraction 55% however patient has diastolic dysfunction grade 2 with dilated left and right atrium, with reduced right heart failure, patient does not have history of clots and no history of COPD or sleep apnea.  Significant improving on her crackles and lower extremity edema after Lasix.  Patient might need pulmonary function test and possible study for chronic pulmonary embolism as outpatient.    Patient has history of diabetes and A1c 9.6, according to the patient and her son her blood sugar has been high, patient refused insulin, we will adjust her medication on discharge.        Interval Problem Update  10/23: I repeated chest x-ray this morning it does appear to be more clear.  Patient however still remains on 4-1/2 L/min nasal cannula and her baseline home is 2 L/min nasal cannula.  She still complaining of swelling in her left leg.  I have ordered ultrasound venous of the left leg D-dimer negative.  I have decreased IV Lasix to Lasix 20 mg p.o. daily with potassium.  EF 55% but grade 2 diastolic dysfunction noted.  I increase metoprolol back to 25 twice daily given she is mildly sinus tachycardic.  Bicarb elevated at 41 on this a.m. labs.  Mentation is normal.        10/24 in bed, family at bedside, no fever or chills,  dry cough, still on 4L of o2, no chest pain or palpation, c/o left ankle pain, will get Xray and US lower extremity, edema appears to be improved.     I have discussed this patient's plan of care and discharge plan at IDT rounds today with Case Management, Nursing, Nursing leadership, and other members of the IDT team.    Consultants/Specialty  None    Code Status  DNAR/DNI    Disposition  Patient is not medically cleared for discharge.   Patient needs more IV Lasix for crackles and oxygen needs, possible discharge on 10/23 if she is on her baseline oxygen requirement.  Anticipate discharge to to home with close outpatient follow-up.  I have placed the appropriate orders for post-discharge needs.    Review of Systems  Review of Systems   Constitutional:  Negative for chills and fever.   HENT:  Negative for ear pain, hearing loss and tinnitus.    Eyes:  Negative for blurred vision, double vision and photophobia.   Respiratory:  Positive for shortness of breath. Negative for cough and hemoptysis.    Cardiovascular:  Positive for orthopnea and leg swelling. Negative for chest pain, palpitations and claudication.   Gastrointestinal:  Negative for abdominal pain, constipation, diarrhea, nausea and vomiting.   Genitourinary:  Negative for dysuria, frequency and urgency.   Musculoskeletal:  Negative for myalgias and neck pain.   Skin:  Negative for rash.   Neurological:  Negative for dizziness, speech change and weakness.      Physical Exam  Temp:  [36.4 °C (97.5 °F)-37.5 °C (99.5 °F)] 36.4 °C (97.5 °F)  Pulse:  [60-83] 60  Resp:  [16-18] 18  BP: ()/(60-71) 95/60  SpO2:  [90 %-96 %] 90 %    Physical Exam  Constitutional:       General: She is not in acute distress.     Appearance: She is not ill-appearing.   Eyes:      General: No scleral icterus.  Cardiovascular:      Rate and Rhythm: Normal rate.      Heart sounds: Murmur heard.   Pulmonary:      Effort: Pulmonary effort is normal.      Breath sounds: Rales  present.   Abdominal:      General: Abdomen is flat. Bowel sounds are normal. There is no distension.      Palpations: Abdomen is soft.      Tenderness: There is no abdominal tenderness.   Musculoskeletal:      Right lower leg: Edema present.      Left lower leg: Edema present.      Comments: Left ankle swelling, no redness, ROM is intact.    Skin:     Coloration: Skin is not jaundiced or pale.      Findings: No bruising or erythema.   Neurological:      General: No focal deficit present.      Mental Status: She is alert and oriented to person, place, and time. Mental status is at baseline.      Cranial Nerves: No cranial nerve deficit.      Sensory: No sensory deficit.      Motor: No weakness.   Psychiatric:         Mood and Affect: Mood normal.       Fluids    Intake/Output Summary (Last 24 hours) at 10/24/2022 1241  Last data filed at 10/24/2022 0609  Gross per 24 hour   Intake --   Output 1850 ml   Net -1850 ml         Laboratory  Recent Labs     10/22/22  0322 10/23/22  0003   WBC 6.6 6.9   RBC 6.09* 6.18*   HEMOGLOBIN 18.8* 18.8*   HEMATOCRIT 59.2* 58.9*   MCV 97.2 95.3   MCH 30.9 30.4   MCHC 31.8* 31.9*   RDW 62.5* 60.2*   PLATELETCT 124* 142*   MPV 9.4 10.2       Recent Labs     10/22/22  0025 10/23/22  0003 10/24/22  0244   SODIUM 138 137 135   POTASSIUM 4.0 3.6 3.6   CHLORIDE 90* 87* 90*   CO2 39* 41* 39*   GLUCOSE 100* 97 119*   BUN 30* 27* 26*   CREATININE 0.74 0.54 0.48*   CALCIUM 9.4 9.6 9.1                       Imaging  DX-ANKLE 3+ VIEWS LEFT   Final Result      1.  Soft tissue swelling about the left lower leg suspicious for cellulitis or venous insufficiency.      2.  Diffuse vascular calcification throughout the left lower extremity arterial system consistent with diabetes mellitus.      3.  Multiple ovoid soft tissue calcifications the left lower leg which may represent phleboliths.      4.  No evidence of fracture or dislocation.      DX-CHEST-PORTABLE (1 VIEW)   Final Result         No acute  "cardiac or pulmonary abnormality is identified. Probable mild perihilar congestion.      EC-ECHOCARDIOGRAM COMPLETE W/O CONT   Final Result      DX-CHEST-PORTABLE (1 VIEW)   Final Result         1.  Pulmonary edema and/or infiltrates.   2.  Small bilateral pleural effusions   3.  Cardiomegaly   4.  Atherosclerosis      US-EXTREMITY VENOUS LOWER UNILAT LEFT    (Results Pending)          Assessment/Plan  * Diastolic heart failure (HCC)- (present on admission)  Assessment & Plan  Came with crackles and lower extremity edema  Echo showed grade 2 diastolic dysfunction with reduced on right ventricle function, ejection fraction 55%  Improving on her symptoms with Lasix,  10/23  Repeat CXR improved, changed IV lasix to po 20mg daily.  CO2 elevated to 41, but normal mentation.  Plan not to overdiurese, cut back lasix.  Still on 4L  Continue gently diuresis  I&Os    Acute on chronic respiratory failure with hypoxia (HCC)  Assessment & Plan  Continue gently diuresis  procal is elevated will start po abx.   Continue monitoring.   On 4L her baseline is 2L.     Acute left ankle pain  Assessment & Plan  Xray and US lower ext pending    Troponin level elevated- (present on admission)  Assessment & Plan  Not significantly elevated with no chest pain and no ischemic changes on EKG  Trending troponin is normal  Telemetry   echo EF 55% DD grade 2.    Polycythemia- (present on admission)  Assessment & Plan  -Hgb 18.4 on presentation  -continue to monitor  Likely due to chronic respiratory failure.     Left breast lump- (present on admission)  Assessment & Plan  -noted to have erythema on breast folds  -US at OSH reported \"cobblestoning\"  -nystatin powder  Mammogram as outpatient    Reactive depression- (present on admission)  Assessment & Plan  -home fluoxetine     Type 2 diabetes mellitus (HCC)- (present on admission)  Assessment & Plan  Uncontrolled with A1c 9.6  Sliding scale  Holding oral medication  Holding back liters on due to " heart failure  Continue Jardiance and glipizide with metformin as outpatient  Patient has financial issues with Jardiance    Dyslipidemia- (present on admission)  Assessment & Plan  Continue simvastatin 20 mg daily  Check lipid panel as outpatient  monitor    Essential hypertension- (present on admission)  Assessment & Plan  Continue home medication metoprolol and benazepril  Close monitoring and adjust the medication if needed  bp is soft, close monitoring  Holding parameter for bp meds         VTE prophylaxis: SCDs/TEDs and enoxaparin ppx    I have performed a physical exam and reviewed and updated ROS and Plan today (10/24/2022). In review of yesterday's note (10/23/2022), there are no changes except as documented above.

## 2022-10-24 NOTE — CARE PLAN
Problem: Care Map:  Admission Optimal Outcome for the Heart Failure Patient  Goal: Admission:  Optimal Care of the heart failure patient  Outcome: Progressing     Problem: Care Map:  Day 1 Optimal Outcome for the Heart Failure Patient  Goal: Day 1:  Optimal Care of the heart failure patient  Outcome: Progressing     Problem: Care Map:  Day 2 Optimal Outcome for the Heart Failure Patient  Goal: Day 2:  Optimal Care of the heart failure patient  Outcome: Progressing     Problem: Care Map:  Day 3 Optimal Outcome for the Heart Failure Patient  Goal: Day 3:  Optimal Care of the heart failure patient  Outcome: Progressing     Problem: Care Map:  Day Before Discharge Optimal Outcome for the Heart Failure Patient  Goal: Day Before Discharge:  Optimal Care of the heart failure patient  Outcome: Progressing     Problem: Care Map:  Day of Discharge Optimal Outcome for the Heart Failure Patient  Goal: Day of Discharge:  Optimal Care of the heart failure patient  Outcome: Progressing     Problem: Knowledge Deficit - Standard  Goal: Patient and family/care givers will demonstrate understanding of plan of care, disease process/condition, diagnostic tests and medications  Outcome: Progressing     Problem: Fall Risk  Goal: Patient will remain free from falls  Outcome: Progressing   The patient is Stable - Low risk of patient condition declining or worsening    Shift Goals  Clinical Goals: Diurese, I/Os  Patient Goals: Rest  Family Goals: DARIA

## 2022-10-24 NOTE — PROGRESS NOTES
Hospital Medicine Daily Progress Note    Date of Service  10/23/2022    Chief Complaint  Kimberly Castañeda is a 76 y.o. female admitted 10/21/2022 with lower extremity edema    Hospital Course:    76-year-old female with history of coronary artery disease, s/p stent, hypertension dyslipidemia and chronic respiratory failure on 2 L nasal cannula who presented 10/21 with worsening shortness of breath.  Patient has had worsening shortness of breath and lower extremity edema, no chest pain no diarrhea or other symptoms.  Patient was found to have crackles with lower extremity edema.  Patient needed 4 L nasal cannula, her baseline 2 L nasal cannula.  Lasix was initiated.  Echo showed normal ejection fraction 55% however patient has diastolic dysfunction grade 2 with dilated left and right atrium, with reduced right heart failure, patient does not have history of clots and no history of COPD or sleep apnea.  Significant improving on her crackles and lower extremity edema after Lasix.  Patient might need pulmonary function test and possible study for chronic pulmonary embolism as outpatient.    Patient has history of diabetes and A1c 9.6, according to the patient and her son her blood sugar has been high, patient refused insulin, we will adjust her medication on discharge.        Interval Problem Update  10/23: I repeated chest x-ray this morning it does appear to be more clear.  Patient however still remains on 4-1/2 L/min nasal cannula and her baseline home is 2 L/min nasal cannula.  She still complaining of swelling in her left leg.  I have ordered ultrasound venous of the left leg D-dimer negative.  I have decreased IV Lasix to Lasix 20 mg p.o. daily with potassium.  EF 55% but grade 2 diastolic dysfunction noted.  I increase metoprolol back to 25 twice daily given she is mildly sinus tachycardic.  Bicarb elevated at 41 on this a.m. labs.  Mentation is normal.  I have discussed this patient's plan of care and discharge  plan at IDT rounds today with Case Management, Nursing, Nursing leadership, and other members of the IDT team.    Consultants/Specialty  None    Code Status  DNAR/DNI    Disposition  Patient is not medically cleared for discharge.   Patient needs more IV Lasix for crackles and oxygen needs, possible discharge on 10/23 if she is on her baseline oxygen requirement.  Anticipate discharge to to home with close outpatient follow-up.  I have placed the appropriate orders for post-discharge needs.    Review of Systems  Review of Systems   Constitutional:  Negative for chills, fever and weight loss.   HENT:  Negative for ear pain, hearing loss and tinnitus.    Eyes:  Negative for blurred vision, double vision and photophobia.   Respiratory:  Positive for shortness of breath. Negative for cough and hemoptysis.    Cardiovascular:  Positive for orthopnea and leg swelling. Negative for chest pain, palpitations and claudication.   Gastrointestinal:  Negative for abdominal pain, constipation, diarrhea, nausea and vomiting.   Genitourinary:  Negative for dysuria, frequency and urgency.   Musculoskeletal:  Negative for myalgias and neck pain.   Skin:  Negative for rash.   Neurological:  Negative for dizziness, speech change and weakness.      Physical Exam  Temp:  [36.5 °C (97.7 °F)-38 °C (100.4 °F)] 37.5 °C (99.5 °F)  Pulse:  [71-97] 83  Resp:  [16-18] 18  BP: ()/(59-73) 130/71  SpO2:  [90 %-94 %] 91 %    Physical Exam  Constitutional:       General: She is not in acute distress.     Appearance: She is not ill-appearing.   Eyes:      General: No scleral icterus.  Cardiovascular:      Rate and Rhythm: Normal rate.      Heart sounds: Murmur heard.   Pulmonary:      Breath sounds: Rales present.   Abdominal:      General: Abdomen is flat. Bowel sounds are normal. There is no distension.      Palpations: Abdomen is soft.      Tenderness: There is no abdominal tenderness. There is no guarding.   Musculoskeletal:      Right lower  leg: Edema present.      Left lower leg: Edema present.   Skin:     Coloration: Skin is not jaundiced or pale.      Findings: No bruising or erythema.   Neurological:      General: No focal deficit present.      Mental Status: She is alert and oriented to person, place, and time. Mental status is at baseline.      Cranial Nerves: No cranial nerve deficit.      Sensory: No sensory deficit.      Motor: No weakness.       Fluids    Intake/Output Summary (Last 24 hours) at 10/23/2022 1823  Last data filed at 10/23/2022 0653  Gross per 24 hour   Intake --   Output 1500 ml   Net -1500 ml       Laboratory  Recent Labs     10/21/22  0034 10/22/22  0322 10/23/22  0003   WBC 6.8 6.6 6.9   RBC 5.94* 6.09* 6.18*   HEMOGLOBIN 18.7* 18.8* 18.8*   HEMATOCRIT 57.9* 59.2* 58.9*   MCV 97.5 97.2 95.3   MCH 31.5 30.9 30.4   MCHC 32.3* 31.8* 31.9*   RDW 63.1* 62.5* 60.2*   PLATELETCT 375 124* 142*   MPV 13.1* 9.4 10.2     Recent Labs     10/21/22  1459 10/22/22  0025 10/23/22  0003   SODIUM 139 138 137   POTASSIUM 4.0 4.0 3.6   CHLORIDE 91* 90* 87*   CO2 38* 39* 41*   GLUCOSE 365* 100* 97   BUN 26* 30* 27*   CREATININE 0.99 0.74 0.54   CALCIUM 9.4 9.4 9.6             Recent Labs     10/21/22  0254   TRIGLYCERIDE 181*   HDL 55   LDL 40       Imaging  DX-CHEST-PORTABLE (1 VIEW)   Final Result         No acute cardiac or pulmonary abnormality is identified. Probable mild perihilar congestion.      EC-ECHOCARDIOGRAM COMPLETE W/O CONT   Final Result      DX-CHEST-PORTABLE (1 VIEW)   Final Result         1.  Pulmonary edema and/or infiltrates.   2.  Small bilateral pleural effusions   3.  Cardiomegaly   4.  Atherosclerosis      US-EXTREMITY VENOUS LOWER UNILAT LEFT    (Results Pending)        Assessment/Plan  * Diastolic heart failure (HCC)- (present on admission)  Assessment & Plan  Came with crackles and lower extremity edema  Echo showed grade 2 diastolic dysfunction with reduced on right ventricle function, ejection fraction  "55%  Improving on her symptoms with Lasix,  10/23  Repeat CXR improved, changed IV lasix to po 20mg daily.  CO2 elevated to 41, but normal mentation.  Plan not to overdiurese, cut back lasix.    Troponin level elevated- (present on admission)  Assessment & Plan  Not significantly elevated with no chest pain and no ischemic changes on EKG  Trending troponin is normal  Telemetry   echo EF 55% DD grade 2.    Polycythemia- (present on admission)  Assessment & Plan  -Hgb 18.4 on presentation  -continue to monitor    Left breast lump- (present on admission)  Assessment & Plan  -noted to have erythema on breast folds  -US at OSH reported \"cobblestoning\"  -nystatin powder  Mammogram as outpatient    Reactive depression- (present on admission)  Assessment & Plan  -home fluoxetine     Type 2 diabetes mellitus (HCC)- (present on admission)  Assessment & Plan  Uncontrolled with A1c 9.6  Sliding scale  Holding oral medication  Holding back liters on due to heart failure  Continue Jardiance and glipizide with metformin as outpatient  Patient has financial issues with Jardiance    Dyslipidemia- (present on admission)  Assessment & Plan  Continue simvastatin 20 mg daily  Check lipid panel as outpatient    Essential hypertension- (present on admission)  Assessment & Plan  Continue home medication metoprolol and benazepril  Close monitoring and adjust the medication if needed       VTE prophylaxis: SCDs/TEDs and enoxaparin ppx    I have performed a physical exam and reviewed and updated ROS and Plan today (10/23/2022). In review of yesterday's note (10/22/2022), there are no changes except as documented above.        "

## 2022-10-25 ENCOUNTER — PHARMACY VISIT (OUTPATIENT)
Dept: PHARMACY | Facility: MEDICAL CENTER | Age: 77
End: 2022-10-25
Payer: MEDICARE

## 2022-10-25 VITALS
HEART RATE: 68 BPM | BODY MASS INDEX: 21.83 KG/M2 | DIASTOLIC BLOOD PRESSURE: 81 MMHG | TEMPERATURE: 97.7 F | RESPIRATION RATE: 16 BRPM | SYSTOLIC BLOOD PRESSURE: 119 MMHG | HEIGHT: 66 IN | OXYGEN SATURATION: 98 % | WEIGHT: 135.8 LBS

## 2022-10-25 PROBLEM — J96.21 ACUTE ON CHRONIC RESPIRATORY FAILURE WITH HYPOXIA (HCC): Status: RESOLVED | Noted: 2022-10-24 | Resolved: 2022-10-25

## 2022-10-25 PROBLEM — M25.572 ACUTE LEFT ANKLE PAIN: Status: RESOLVED | Noted: 2022-10-24 | Resolved: 2022-10-25

## 2022-10-25 LAB
ANION GAP SERPL CALC-SCNC: 8 MMOL/L (ref 7–16)
BUN SERPL-MCNC: 27 MG/DL (ref 8–22)
CALCIUM SERPL-MCNC: 9.4 MG/DL (ref 8.5–10.5)
CHLORIDE SERPL-SCNC: 91 MMOL/L (ref 96–112)
CO2 SERPL-SCNC: 35 MMOL/L (ref 20–33)
CREAT SERPL-MCNC: 0.45 MG/DL (ref 0.5–1.4)
ERYTHROCYTE [DISTWIDTH] IN BLOOD BY AUTOMATED COUNT: 60.2 FL (ref 35.9–50)
GFR SERPLBLD CREATININE-BSD FMLA CKD-EPI: 98 ML/MIN/1.73 M 2
GLUCOSE BLD STRIP.AUTO-MCNC: 138 MG/DL (ref 65–99)
GLUCOSE SERPL-MCNC: 90 MG/DL (ref 65–99)
HCT VFR BLD AUTO: 57.2 % (ref 37–47)
HGB BLD-MCNC: 18 G/DL (ref 12–16)
MAGNESIUM SERPL-MCNC: 1.7 MG/DL (ref 1.5–2.5)
MCH RBC QN AUTO: 30.2 PG (ref 27–33)
MCHC RBC AUTO-ENTMCNC: 31.5 G/DL (ref 33.6–35)
MCV RBC AUTO: 95.8 FL (ref 81.4–97.8)
PHOSPHATE SERPL-MCNC: 2.6 MG/DL (ref 2.5–4.5)
PLATELET # BLD AUTO: 123 K/UL (ref 164–446)
PMV BLD AUTO: 10.3 FL (ref 9–12.9)
POTASSIUM SERPL-SCNC: 3.9 MMOL/L (ref 3.6–5.5)
RBC # BLD AUTO: 5.97 M/UL (ref 4.2–5.4)
SODIUM SERPL-SCNC: 134 MMOL/L (ref 135–145)
WBC # BLD AUTO: 6 K/UL (ref 4.8–10.8)

## 2022-10-25 PROCEDURE — 700102 HCHG RX REV CODE 250 W/ 637 OVERRIDE(OP): Performed by: STUDENT IN AN ORGANIZED HEALTH CARE EDUCATION/TRAINING PROGRAM

## 2022-10-25 PROCEDURE — 700102 HCHG RX REV CODE 250 W/ 637 OVERRIDE(OP): Performed by: HOSPITALIST

## 2022-10-25 PROCEDURE — 700102 HCHG RX REV CODE 250 W/ 637 OVERRIDE(OP): Performed by: INTERNAL MEDICINE

## 2022-10-25 PROCEDURE — RXMED WILLOW AMBULATORY MEDICATION CHARGE: Performed by: HOSPITALIST

## 2022-10-25 PROCEDURE — A9270 NON-COVERED ITEM OR SERVICE: HCPCS | Performed by: STUDENT IN AN ORGANIZED HEALTH CARE EDUCATION/TRAINING PROGRAM

## 2022-10-25 PROCEDURE — 85027 COMPLETE CBC AUTOMATED: CPT

## 2022-10-25 PROCEDURE — 82962 GLUCOSE BLOOD TEST: CPT

## 2022-10-25 PROCEDURE — A9270 NON-COVERED ITEM OR SERVICE: HCPCS | Performed by: HOSPITALIST

## 2022-10-25 PROCEDURE — 84100 ASSAY OF PHOSPHORUS: CPT

## 2022-10-25 PROCEDURE — 80048 BASIC METABOLIC PNL TOTAL CA: CPT

## 2022-10-25 PROCEDURE — 99239 HOSP IP/OBS DSCHRG MGMT >30: CPT | Performed by: HOSPITALIST

## 2022-10-25 PROCEDURE — 83735 ASSAY OF MAGNESIUM: CPT

## 2022-10-25 PROCEDURE — A9270 NON-COVERED ITEM OR SERVICE: HCPCS | Performed by: INTERNAL MEDICINE

## 2022-10-25 RX ORDER — POTASSIUM CHLORIDE 750 MG/1
10 TABLET, EXTENDED RELEASE ORAL DAILY
Qty: 30 TABLET | Refills: 0 | Status: SHIPPED | OUTPATIENT
Start: 2022-10-25

## 2022-10-25 RX ORDER — FUROSEMIDE 20 MG/1
20 TABLET ORAL DAILY
Qty: 30 TABLET | Refills: 0 | Status: SHIPPED | OUTPATIENT
Start: 2022-10-25

## 2022-10-25 RX ORDER — AMOXICILLIN AND CLAVULANATE POTASSIUM 875; 125 MG/1; MG/1
1 TABLET, FILM COATED ORAL EVERY 12 HOURS
Qty: 8 TABLET | Refills: 0 | Status: SHIPPED | OUTPATIENT
Start: 2022-10-25 | End: 2022-10-29

## 2022-10-25 RX ADMIN — FLUOXETINE HYDROCHLORIDE 20 MG: 20 CAPSULE ORAL at 04:43

## 2022-10-25 RX ADMIN — BENAZEPRIL HYDROCHLORIDE 20 MG: 20 TABLET ORAL at 04:43

## 2022-10-25 RX ADMIN — ASPIRIN 81 MG 81 MG: 81 TABLET ORAL at 04:42

## 2022-10-25 RX ADMIN — METOPROLOL TARTRATE 25 MG: 25 TABLET, FILM COATED ORAL at 04:42

## 2022-10-25 RX ADMIN — POTASSIUM CHLORIDE 20 MEQ: 1500 TABLET, EXTENDED RELEASE ORAL at 04:42

## 2022-10-25 RX ADMIN — SENNOSIDES AND DOCUSATE SODIUM 2 TABLET: 50; 8.6 TABLET ORAL at 04:42

## 2022-10-25 RX ADMIN — INSULIN GLARGINE-YFGN 5 UNITS: 100 INJECTION, SOLUTION SUBCUTANEOUS at 06:08

## 2022-10-25 RX ADMIN — FUROSEMIDE 20 MG: 20 TABLET ORAL at 04:43

## 2022-10-25 RX ADMIN — AMOXICILLIN AND CLAVULANATE POTASSIUM 1 TABLET: 875; 125 TABLET, FILM COATED ORAL at 04:42

## 2022-10-25 NOTE — CARE PLAN
The patient is Watcher - Medium risk of patient condition declining or worsening    Shift Goals  Clinical Goals: Monitor O2  Patient Goals: Pain control of swollen L ankle  Family Goals: DARIA    Progress made toward(s) clinical / shift goals:    Problem: Care Map:  Day Before Discharge Optimal Outcome for the Heart Failure Patient  Goal: Day Before Discharge:  Optimal Care of the heart failure patient  Outcome: Progressing

## 2022-10-25 NOTE — DISCHARGE SUMMARY
Discharge Summary    CHIEF COMPLAINT ON ADMISSION  Chief Complaint   Patient presents with    Shortness of Breath     Pt is a transfer from [facility]. She has been experiencing SOB, ANDERSON, and BLE pitting edema for the last two weeks. She wears 2L baseline, up to 4L now. Pt had 40mg of lasix at sending facility with 2500 urine output.        Reason for Admission  Transfer    Admission Date  10/21/2022     CODE STATUS  DNAR/DNI    HPI & HOSPITAL COURSE    Patient is a 76 year old female with history of coronary artery disease, s/p stent, hypertension dyslipidemia and chronic respiratory failure on 2 L nasal cannula . She presented to [facility] on 10/21 with worsening shortness of breath and lower extremity edema.  She was found to have pulmonary edema with acute on chronic respiratory failure, requiring 4L nasal cannula. Echo revealed a normal ejection fraction 55% with diastolic dysfunction grade 2 with dilated left and right atrium, with reduced right heart failure. She experienced marked improvement with diuresis and at time of discharge is back at her baseline, asymptomatic and doing well on 2l.      She has mild/ chronic polycythemia, which is likely due to chronic hypoxia and agrees to have this along with mild thrombocytopenia and elevated (but improved Co2) followed by her pcp and pulmonary specialist. She also agrees to have her chronic breast lump followed by her pcp. She expressed good verbal understanding that this may be breast cancer and prompt follow up is recommended.      She plans to follow up in [place] and agrees to return to the ER if needed.     Therefore, she is discharged in fair and stable condition to home with close outpatient follow-up.    The patient met 2-midnight criteria for an inpatient stay at the time of discharge.      FOLLOW UP ITEMS POST DISCHARGE  10/25/22    DISCHARGE DIAGNOSES  Principal Problem:    Diastolic heart failure (HCC) POA: Yes  Active Problems:    Essential  hypertension POA: Yes    Dyslipidemia POA: Yes    Type 2 diabetes mellitus (HCC) POA: Yes    Reactive depression POA: Yes    Left breast lump POA: Yes    Polycythemia POA: Yes    Troponin level elevated POA: Yes  Resolved Problems:    Acute left ankle pain POA: Unknown    Acute on chronic respiratory failure with hypoxia (HCC) POA: Unknown      FOLLOW UP  No future appointments.  KB CastilloRDREW  [address] [facility] [address]  [phone]    Go on 11/1/2022  Please go to your follow up appointment with Doreen QUILESRDREW on Tuesday November 1,2022 at 8:30am.    Heart Failure Education  Please access the [facility] My HF Guide/Heart Failure Interactive Workbook:   [email or web address]  Follow up on 10/26/2022  For Heart Failure Care      MEDICATIONS ON DISCHARGE     Medication List        START taking these medications        Instructions   amoxicillin-clavulanate 875-125 MG Tabs  Commonly known as: AUGMENTIN   Take 1 Tablet by mouth every 12 hours for 4 days.  Dose: 1 Tablet     furosemide 20 MG Tabs  Commonly known as: LASIX   Doctor's comments: Hold for sbp less 105  Take 1 Tablet by mouth every day. Hold for sbp less 105  Dose: 20 mg     potassium chloride SA 10 MEQ Tbcr  Commonly known as: K-DUR   Take 1 Tablet by mouth every day.  Dose: 10 mEq            CONTINUE taking these medications        Instructions   aspirin 81 MG Chew chewable tablet  Commonly known as: ASA   Chew 1 tablet every day.  Dose: 81 mg     benazepril 40 MG tablet  Commonly known as: LOTENSIN   Take 40 mg by mouth every day.  Dose: 40 mg     CALCIUM PO   Take 1 Tablet by mouth every day.  Dose: 1 Tablet     CO Q 10 PO   Take 1 Tablet by mouth every day.  Dose: 1 Tablet     FLUoxetine 20 MG Caps  Commonly known as: PROZAC   Take 1 Cap by mouth every day.  Dose: 20 mg     glipiZIDE 10 MG Tabs  Commonly known as: GLUCOTROL   Take 10 mg by mouth 2 times a day.  Dose: 10 mg     Jardiance 25 MG Tabs  Generic  drug: Empagliflozin   Take 25 mg by mouth every day.  Dose: 25 mg     metFORMIN 850 MG Tabs  Commonly known as: GLUCOPHAGE   Take 850 mg by mouth 3 times a day with meals.  Dose: 850 mg     metoprolol tartrate 25 MG Tabs  Commonly known as: LOPRESSOR   Take 1 Tab by mouth 2 times a day.  Dose: 25 mg     nitroglycerin 0.4 MG Subl  Commonly known as: NITROSTAT   Place 1 Tab under tongue as needed for Chest Pain.  Dose: 0.4 mg     NON SPECIFIED   Portable Oxygen  Dx: COPD     pioglitazone 45 MG Tabs  Commonly known as: ACTOS   Take 45 mg by mouth every morning.  Dose: 45 mg     simvastatin 20 MG Tabs  Commonly known as: ZOCOR   Take 1 Tab by mouth every evening.  Dose: 20 mg              Allergies  Allergies   Allergen Reactions    Aldactone [Kdc:Yellow Dye+Spironolactone] Palpitations    Percocet [Apap-Fd&C Red #40 [place]-Oxycodone] Rash     Full body  Patient states she can take if she also takes Benadryl       DIET  Orders Placed This Encounter   Procedures    Diet Order Diet: Consistent CHO (Diabetic); Second Modifier: (optional): 2 Gram Sodium     Standing Status:   Standing     Number of Occurrences:   1     Order Specific Question:   Diet:     Answer:   Consistent CHO (Diabetic) [4]     Order Specific Question:   Second Modifier: (optional)     Answer:   2 Gram Sodium [7]       ACTIVITY  As tolerated.  Weight bearing as tolerated    LINES, DRAINS, AND WOUNDS  This is an automated list. Peripheral IVs will be removed prior to discharge.  Peripheral IV 10/21/22 20 G Right Antecubital (Active)   Site Assessment Clean;Dry;Intact 10/25/22 0700   Dressing Type Transparent 10/25/22 0700   Line Status Saline locked 10/25/22 0700   Dressing Status Clean;Dry;Intact 10/25/22 0700   Dressing Intervention N/A 10/25/22 0700   Infiltration Grading ([facility]) 0 10/25/22 0700   Phlebitis Scale ([facility] Only) 0 10/25/22 0700          Peripheral IV 10/21/22 20 G Right Antecubital (Active)   Site Assessment Clean;Dry;Intact  10/25/22 0700   Dressing Type Transparent 10/25/22 0700   Line Status Saline locked 10/25/22 0700   Dressing Status Clean;Dry;Intact 10/25/22 0700   Dressing Intervention N/A 10/25/22 0700   Infiltration Grading ([facility], [facility]) 0 10/25/22 0700   Phlebitis Scale ([facility] Only) 0 10/25/22 0700               MENTAL STATUS ON TRANSFER        CONSULTATIONS      PROCEDURES  US-EXTREMITY VENOUS LOWER UNILAT LEFT   Final Result      DX-ANKLE 3+ VIEWS LEFT   Final Result      1.  Soft tissue swelling about the left lower leg suspicious for cellulitis or venous insufficiency.      2.  Diffuse vascular calcification throughout the left lower extremity arterial system consistent with diabetes mellitus.      3.  Multiple ovoid soft tissue calcifications the left lower leg which may represent phleboliths.      4.  No evidence of fracture or dislocation.      DX-CHEST-PORTABLE (1 VIEW)   Final Result         No acute cardiac or pulmonary abnormality is identified. Probable mild perihilar congestion.      EC-ECHOCARDIOGRAM COMPLETE W/O CONT   Final Result      DX-CHEST-PORTABLE (1 VIEW)   Final Result         1.  Pulmonary edema and/or infiltrates.   2.  Small bilateral pleural effusions   3.  Cardiomegaly   4.  Atherosclerosis            LABORATORY  Lab Results   Component Value Date    SODIUM 134 (L) 10/25/2022    POTASSIUM 3.9 10/25/2022    CHLORIDE 91 (L) 10/25/2022    CO2 35 (H) 10/25/2022    GLUCOSE 90 10/25/2022    BUN 27 (H) 10/25/2022    CREATININE 0.45 (L) 10/25/2022        Lab Results   Component Value Date    WBC 6.0 10/25/2022    HEMOGLOBIN 18.0 (H) 10/25/2022    HEMATOCRIT 57.2 (H) 10/25/2022    PLATELETCT 123 (L) 10/25/2022        Total time of the discharge process exceeds 45 minutes.

## 2022-10-25 NOTE — PROGRESS NOTES
Patient discharging home, son to provide ride. Patient needs oxygen for transport home and will be receiving that from [facility]. Pt to wait in discharge lounge for oxygen. IV removed, patient dressed, belonging in bags, discharge instructions reviewed & meds to beds given to patient at bedside.

## 2022-10-25 NOTE — PROGRESS NOTES
Monitor Summary:     Rhythm: Sinus Oneil/Rhythm    Rate: 59-69    Measurement: .24/.09/.40    Ectopy: R PACs, R PVCs    12 hr cc

## 2022-10-25 NOTE — HEART FAILURE PROGRAM
Discharge Order Quality Check-Up - HFpEF    Patient has the below appointment which is appropriate for anticipated discharge to home with O2 today.     Patient lives in [place], [place]. She does need to establish with cardiology - schedulers let me know that patient wants to see cardiology in [place] closer to where she lives. The schedulers are trying to get a cardiology office to return their calls down there.    HF Education Documented? Some of the points are   Where we stand right now on HFpEF MEASURES per review of most recent notes and discharge med rec.    Anticoagulation for atrial arrhythmia, if applicable: ***  Glycemic control for DM + HF: ***  Lipid lowering medication for DM + HF: ***  Pneumococcal vaccine: ***  Influenza vaccine for the current flu season: ***  Documentation of nicotine cessation counseling: ***  Referral to disease management program specializing in heart failure care: ***  Valve team alert:      Thank you, Cori Cardio RN Navigator [phone]

## 2022-10-25 NOTE — DISCHARGE INSTRUCTIONS
HF Patient Discharge Instructions  Monitor your weight daily, and maintain a weight chart, to track your weight changes.   Activity as tolerated, unless your Doctor has ordered otherwise. Other activity order: as tolerate.  Follow a low fat, low cholesterol, low salt diet unless instructed otherwise by your Doctor. Read the labels on the back of food products and track your intake of fat, cholesterol and salt.   Fluid Restriction No. If a Fluid Restriction has been ordered by your Doctor, measure fluids with a measuring cup to ensure that you are not exceeding the restriction.   No smoking.  Oxygen Yes. If your Doctor has ordered that you wear Oxygen at home, it is important to wear it as ordered.  Did you receive an explanation from staff on the importance of taking each of your medications and why it is necessary to keep taking them unless your doctor says to stop? Yes  Were all of your questions answered about how to manage your heart failure and what to do if you have increased signs and symptoms after you go home? Yes  Do you feel like your heart failure care team involved you in the care treatment plan and allowed you to make decisions regarding your care while in the hospital and addressed any discharge needs you might have? Yes    See the educational handout provided at discharge for more information on monitoring your daily weight, activity and diet. This also explains more about Heart Failure, symptoms of a flare-up and some of the tests that you have undergone.     Warning Signs of a Flare-Up include:  Swelling in the ankles or lower legs.  Shortness of breath, while at rest, or while doing normal activities.   Shortness of breath at night when in bed, or coughing in bed.   Requiring more pillows to sleep at night, or needing to sit up at night to sleep.  Feeling weak, dizzy or fatigued.     When to call your Doctor:  Call [facility] seven days a week from 8:00 a.m. to 8:00 p.m. for medical  questions [phone].  Call your Primary Care Physician or Cardiologist if:   You experience any pain radiating to your jaw or neck.  You have any difficulty breathing.  You experience weight gain of 3 lbs in a day or 5 lbs in a week.   You feel any palpitations or irregular heartbeats.  You become dizzy or lose consciousness.   If you have had an angiogram or had a pacemaker or AICD placed, and experience:  Bleeding, drainage or swelling at the surgical / puncture site.  Fever greater than 100.0 F  Shock from internal defibrillator.  Cool and / or numb extremities.

## 2022-10-25 NOTE — DISCHARGE PLANNING
PITO requested portable tanks from [facility] for pt to return to [place]. Pt on service with [facility], although normally uses an [employer] portable concentrator. Pt's son at bedside to give pt ride home.    Updated: PITO updated [facility] that pt moving down to d/c [place].

## 2022-10-25 NOTE — PROGRESS NOTES
Pt DC'd. DC instructions provided by primary RN. Pt states all belongings in possession. Oxygen delivered to pt in [facility]. Pt escorted off unit by CNA without incident.

## 2022-10-25 NOTE — DOCUMENTATION QUERY
[facility]                                                                       Query Response Note      PATIENT:               STANLEY CAMARENA  ACCT #:                  [id number]  MRN:                     [id number]  :                      1945  ADMIT DATE:       10/21/2022 12:20 AM  DISCH DATE:        10/25/2022 11:00 AM  RESPONDING  PROVIDER #:        [id number]           QUERY TEXT:    Based on the above clinical indicators and treatments please specify the acuity of the diastolic heart failure.    Thank you.    The patient's Clinical Indicators include:  10/25 DC summary: Diastolic heart failure.  10/21 Chest XR: Pulmonary edema.    Risk factor: HTN    Treatment: IV Lasix    Thank you,  Cristina Frazier  Clinical   Connect via markedup  Options provided:   -- Acute diastolic heart failure   -- Acute on chronic diastolic heart failure   -- Other explanation, Please specify   -- Unable to determine      Query created by: Cristina Frazier on 10/25/2022 1:48 PM    RESPONSE TEXT:    Acute diastolic heart failure          Electronically signed by:  JONNIE BOWER MD 10/25/2022 3:00 PM

## 2022-10-31 LAB — GLUCOSE BLD STRIP.AUTO-MCNC: 171 MG/DL (ref 65–99)

## 2023-08-21 ENCOUNTER — TELEPHONE (OUTPATIENT)
Dept: HEALTH INFORMATION MANAGEMENT | Facility: OTHER | Age: 78
End: 2023-08-21
Payer: MEDICARE

## 2025-03-21 ENCOUNTER — HOSPITAL ENCOUNTER (OUTPATIENT)
Dept: RADIOLOGY | Facility: MEDICAL CENTER | Age: 80
End: 2025-03-21
Payer: MEDICARE

## 2025-03-21 ENCOUNTER — HOSPITAL ENCOUNTER (INPATIENT)
Facility: MEDICAL CENTER | Age: 80
LOS: 5 days | DRG: 321 | End: 2025-03-26
Attending: INTERNAL MEDICINE | Admitting: INTERNAL MEDICINE
Payer: MEDICARE

## 2025-03-21 DIAGNOSIS — F32.9 REACTIVE DEPRESSION: ICD-10-CM

## 2025-03-21 DIAGNOSIS — J44.9 CHRONIC OBSTRUCTIVE PULMONARY DISEASE, UNSPECIFIED COPD TYPE (HCC): ICD-10-CM

## 2025-03-21 DIAGNOSIS — S93.401A SPRAIN OF RIGHT ANKLE, UNSPECIFIED LIGAMENT, INITIAL ENCOUNTER: ICD-10-CM

## 2025-03-21 DIAGNOSIS — R78.81 BACTEREMIA: ICD-10-CM

## 2025-03-21 DIAGNOSIS — I24.0: ICD-10-CM

## 2025-03-21 DIAGNOSIS — I10 ESSENTIAL HYPERTENSION: ICD-10-CM

## 2025-03-21 DIAGNOSIS — E11.69 TYPE 2 DIABETES MELLITUS WITH OTHER SPECIFIED COMPLICATION, WITH LONG-TERM CURRENT USE OF INSULIN (HCC): ICD-10-CM

## 2025-03-21 DIAGNOSIS — K59.03 DRUG-INDUCED CONSTIPATION: ICD-10-CM

## 2025-03-21 DIAGNOSIS — F41.9 ANXIETY: ICD-10-CM

## 2025-03-21 DIAGNOSIS — I48.91 ATRIAL FIBRILLATION WITH RAPID VENTRICULAR RESPONSE (HCC): ICD-10-CM

## 2025-03-21 DIAGNOSIS — Z79.4 TYPE 2 DIABETES MELLITUS WITH OTHER SPECIFIED COMPLICATION, WITH LONG-TERM CURRENT USE OF INSULIN (HCC): ICD-10-CM

## 2025-03-21 DIAGNOSIS — E78.5 DYSLIPIDEMIA: ICD-10-CM
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
## 2025-03-21 PROBLEM — R57.9 SHOCK (HCC): Status: ACTIVE | Noted: 2025-03-21

## 2025-03-21 PROBLEM — I50.21 ACUTE HFREF (HEART FAILURE WITH REDUCED EJECTION FRACTION) (HCC): Status: ACTIVE | Noted: 2025-03-21

## 2025-03-21 PROBLEM — D72.829 LEUKOCYTOSIS: Status: ACTIVE | Noted: 2025-03-21

## 2025-03-21 LAB
ALBUMIN SERPL BCP-MCNC: 4.2 G/DL (ref 3.2–4.9)
ALBUMIN/GLOB SERPL: 1.4 G/DL
ALP SERPL-CCNC: 56 U/L (ref 30–99)
ALT SERPL-CCNC: 21 U/L (ref 2–50)
ANION GAP SERPL CALC-SCNC: 17 MMOL/L (ref 7–16)
ANION GAP SERPL CALC-SCNC: 20 MMOL/L (ref 7–16)
APTT PPP: 26.3 SEC (ref 24.7–36)
AST SERPL-CCNC: 35 U/L (ref 12–45)
B PARAP IS1001 DNA NPH QL NAA+NON-PROBE: NOT DETECTED
B PERT.PT PRMT NPH QL NAA+NON-PROBE: NOT DETECTED
B-OH-BUTYR SERPL-MCNC: 1.73 MMOL/L (ref 0.02–0.27)
BASOPHILS # BLD AUTO: 0.2 % (ref 0–1.8)
BASOPHILS # BLD: 0.03 K/UL (ref 0–0.12)
BILIRUB SERPL-MCNC: 0.4 MG/DL (ref 0.1–1.5)
BUN SERPL-MCNC: 32 MG/DL (ref 8–22)
BUN SERPL-MCNC: 33 MG/DL (ref 8–22)
C PNEUM DNA NPH QL NAA+NON-PROBE: NOT DETECTED
CALCIUM ALBUM COR SERPL-MCNC: 9.3 MG/DL (ref 8.5–10.5)
CALCIUM SERPL-MCNC: 10 MG/DL (ref 8.5–10.5)
CALCIUM SERPL-MCNC: 9.5 MG/DL (ref 8.5–10.5)
CHLORIDE SERPL-SCNC: 95 MMOL/L (ref 96–112)
CHLORIDE SERPL-SCNC: 95 MMOL/L (ref 96–112)
CHOLEST SERPL-MCNC: 173 MG/DL (ref 100–199)
CO2 SERPL-SCNC: 17 MMOL/L (ref 20–33)
CO2 SERPL-SCNC: 22 MMOL/L (ref 20–33)
CREAT SERPL-MCNC: 0.79 MG/DL (ref 0.5–1.4)
CREAT SERPL-MCNC: 0.79 MG/DL (ref 0.5–1.4)
EKG IMPRESSION: NORMAL
EOSINOPHIL # BLD AUTO: 0.1 K/UL (ref 0–0.51)
EOSINOPHIL NFR BLD: 0.6 % (ref 0–6.9)
ERYTHROCYTE [DISTWIDTH] IN BLOOD BY AUTOMATED COUNT: 57.9 FL (ref 35.9–50)
EST. AVERAGE GLUCOSE BLD GHB EST-MCNC: 298 MG/DL
FLUAV RNA NPH QL NAA+NON-PROBE: NOT DETECTED
FLUBV RNA NPH QL NAA+NON-PROBE: NOT DETECTED
GFR SERPLBLD CREATININE-BSD FMLA CKD-EPI: 75 ML/MIN/1.73 M 2
GFR SERPLBLD CREATININE-BSD FMLA CKD-EPI: 75 ML/MIN/1.73 M 2
GLOBULIN SER CALC-MCNC: 3.1 G/DL (ref 1.9–3.5)
GLUCOSE BLD STRIP.AUTO-MCNC: 201 MG/DL (ref 65–99)
GLUCOSE BLD STRIP.AUTO-MCNC: 255 MG/DL (ref 65–99)
GLUCOSE BLD STRIP.AUTO-MCNC: 287 MG/DL (ref 65–99)
GLUCOSE BLD STRIP.AUTO-MCNC: 328 MG/DL (ref 65–99)
GLUCOSE BLD STRIP.AUTO-MCNC: 342 MG/DL (ref 65–99)
GLUCOSE SERPL-MCNC: 281 MG/DL (ref 65–99)
GLUCOSE SERPL-MCNC: 410 MG/DL (ref 65–99)
HADV DNA NPH QL NAA+NON-PROBE: NOT DETECTED
HBA1C MFR BLD: 12 % (ref 4–5.6)
HCOV 229E RNA NPH QL NAA+NON-PROBE: NOT DETECTED
HCOV HKU1 RNA NPH QL NAA+NON-PROBE: NOT DETECTED
HCOV NL63 RNA NPH QL NAA+NON-PROBE: NOT DETECTED
HCOV OC43 RNA NPH QL NAA+NON-PROBE: NOT DETECTED
HCT VFR BLD AUTO: 42.5 % (ref 37–47)
HDLC SERPL-MCNC: 106 MG/DL
HGB BLD-MCNC: 13.1 G/DL (ref 12–16)
HMPV RNA NPH QL NAA+NON-PROBE: NOT DETECTED
HPIV1 RNA NPH QL NAA+NON-PROBE: NOT DETECTED
HPIV2 RNA NPH QL NAA+NON-PROBE: NOT DETECTED
HPIV3 RNA NPH QL NAA+NON-PROBE: NOT DETECTED
HPIV4 RNA NPH QL NAA+NON-PROBE: NOT DETECTED
IMM GRANULOCYTES # BLD AUTO: 0.11 K/UL (ref 0–0.11)
IMM GRANULOCYTES NFR BLD AUTO: 0.7 % (ref 0–0.9)
INR PPP: 0.98 (ref 0.87–1.13)
LACTATE SERPL-SCNC: 1.1 MMOL/L (ref 0.5–2)
LACTATE SERPL-SCNC: 1.7 MMOL/L (ref 0.5–2)
LACTATE SERPL-SCNC: 1.9 MMOL/L (ref 0.5–2)
LDLC SERPL CALC-MCNC: 58 MG/DL
LYMPHOCYTES # BLD AUTO: 0.77 K/UL (ref 1–4.8)
LYMPHOCYTES NFR BLD: 4.6 % (ref 22–41)
M PNEUMO DNA NPH QL NAA+NON-PROBE: NOT DETECTED
MAGNESIUM SERPL-MCNC: 2 MG/DL (ref 1.5–2.5)
MCH RBC QN AUTO: 30.9 PG (ref 27–33)
MCHC RBC AUTO-ENTMCNC: 30.8 G/DL (ref 32.2–35.5)
MCV RBC AUTO: 100.2 FL (ref 81.4–97.8)
MONOCYTES # BLD AUTO: 1.28 K/UL (ref 0–0.85)
MONOCYTES NFR BLD AUTO: 7.6 % (ref 0–13.4)
NEUTROPHILS # BLD AUTO: 14.48 K/UL (ref 1.82–7.42)
NEUTROPHILS NFR BLD: 86.3 % (ref 44–72)
NRBC # BLD AUTO: 0 K/UL
NRBC BLD-RTO: 0 /100 WBC (ref 0–0.2)
NT-PROBNP SERPL IA-MCNC: ABNORMAL PG/ML (ref 0–125)
PLATELET # BLD AUTO: 260 K/UL (ref 164–446)
PMV BLD AUTO: 10.2 FL (ref 9–12.9)
POTASSIUM SERPL-SCNC: 5.1 MMOL/L (ref 3.6–5.5)
POTASSIUM SERPL-SCNC: 5.3 MMOL/L (ref 3.6–5.5)
PROCALCITONIN SERPL-MCNC: 0.44 NG/ML
PROT SERPL-MCNC: 7.3 G/DL (ref 6–8.2)
PROTHROMBIN TIME: 13 SEC (ref 12–14.6)
RBC # BLD AUTO: 4.24 M/UL (ref 4.2–5.4)
RSV RNA NPH QL NAA+NON-PROBE: NOT DETECTED
RV+EV RNA NPH QL NAA+NON-PROBE: NOT DETECTED
SARS-COV-2 RNA NPH QL NAA+NON-PROBE: NOTDETECTED
SCCMEC + MECA PNL NOSE NAA+PROBE: NEGATIVE
SODIUM SERPL-SCNC: 132 MMOL/L (ref 135–145)
SODIUM SERPL-SCNC: 134 MMOL/L (ref 135–145)
TRIGL SERPL-MCNC: 44 MG/DL (ref 0–149)
TROPONIN T SERPL-MCNC: 219 NG/L (ref 6–19)
TROPONIN T SERPL-MCNC: 231 NG/L (ref 6–19)
TSH SERPL DL<=0.005 MIU/L-ACNC: 0.99 UIU/ML (ref 0.38–5.33)
UFH PPP CHRO-ACNC: 0.57 IU/ML
UFH PPP CHRO-ACNC: <0.1 IU/ML
WBC # BLD AUTO: 16.8 K/UL (ref 4.8–10.8)

## 2025-03-21 PROCEDURE — 80053 COMPREHEN METABOLIC PANEL: CPT

## 2025-03-21 PROCEDURE — 84145 PROCALCITONIN (PCT): CPT

## 2025-03-21 PROCEDURE — 770022 HCHG ROOM/CARE - ICU (200)

## 2025-03-21 PROCEDURE — 94660 CPAP INITIATION&MGMT: CPT

## 2025-03-21 PROCEDURE — 80048 BASIC METABOLIC PNL TOTAL CA: CPT

## 2025-03-21 PROCEDURE — 700102 HCHG RX REV CODE 250 W/ 637 OVERRIDE(OP): Performed by: STUDENT IN AN ORGANIZED HEALTH CARE EDUCATION/TRAINING PROGRAM

## 2025-03-21 PROCEDURE — 85730 THROMBOPLASTIN TIME PARTIAL: CPT

## 2025-03-21 PROCEDURE — A9270 NON-COVERED ITEM OR SERVICE: HCPCS | Performed by: STUDENT IN AN ORGANIZED HEALTH CARE EDUCATION/TRAINING PROGRAM

## 2025-03-21 PROCEDURE — 93010 ELECTROCARDIOGRAM REPORT: CPT | Performed by: INTERNAL MEDICINE

## 2025-03-21 PROCEDURE — 87641 MR-STAPH DNA AMP PROBE: CPT

## 2025-03-21 PROCEDURE — 85520 HEPARIN ASSAY: CPT | Mod: 91

## 2025-03-21 PROCEDURE — 83605 ASSAY OF LACTIC ACID: CPT

## 2025-03-21 PROCEDURE — 700111 HCHG RX REV CODE 636 W/ 250 OVERRIDE (IP): Performed by: INTERNAL MEDICINE

## 2025-03-21 PROCEDURE — 83880 ASSAY OF NATRIURETIC PEPTIDE: CPT

## 2025-03-21 PROCEDURE — 99223 1ST HOSP IP/OBS HIGH 75: CPT | Performed by: INTERNAL MEDICINE

## 2025-03-21 PROCEDURE — 84443 ASSAY THYROID STIM HORMONE: CPT

## 2025-03-21 PROCEDURE — 82962 GLUCOSE BLOOD TEST: CPT | Mod: 91

## 2025-03-21 PROCEDURE — 85025 COMPLETE CBC W/AUTO DIFF WBC: CPT

## 2025-03-21 PROCEDURE — 0202U NFCT DS 22 TRGT SARS-COV-2: CPT

## 2025-03-21 PROCEDURE — 83036 HEMOGLOBIN GLYCOSYLATED A1C: CPT

## 2025-03-21 PROCEDURE — 700101 HCHG RX REV CODE 250

## 2025-03-21 PROCEDURE — 700102 HCHG RX REV CODE 250 W/ 637 OVERRIDE(OP)

## 2025-03-21 PROCEDURE — 84484 ASSAY OF TROPONIN QUANT: CPT

## 2025-03-21 PROCEDURE — 700111 HCHG RX REV CODE 636 W/ 250 OVERRIDE (IP): Mod: JZ

## 2025-03-21 PROCEDURE — A9270 NON-COVERED ITEM OR SERVICE: HCPCS

## 2025-03-21 PROCEDURE — 700105 HCHG RX REV CODE 258

## 2025-03-21 PROCEDURE — 83735 ASSAY OF MAGNESIUM: CPT

## 2025-03-21 PROCEDURE — 82010 KETONE BODYS QUAN: CPT

## 2025-03-21 PROCEDURE — 700111 HCHG RX REV CODE 636 W/ 250 OVERRIDE (IP): Mod: JZ | Performed by: STUDENT IN AN ORGANIZED HEALTH CARE EDUCATION/TRAINING PROGRAM

## 2025-03-21 PROCEDURE — 85610 PROTHROMBIN TIME: CPT

## 2025-03-21 PROCEDURE — 93005 ELECTROCARDIOGRAM TRACING: CPT | Mod: TC | Performed by: STUDENT IN AN ORGANIZED HEALTH CARE EDUCATION/TRAINING PROGRAM

## 2025-03-21 PROCEDURE — 99291 CRITICAL CARE FIRST HOUR: CPT | Mod: GC | Performed by: STUDENT IN AN ORGANIZED HEALTH CARE EDUCATION/TRAINING PROGRAM

## 2025-03-21 PROCEDURE — 80061 LIPID PANEL: CPT

## 2025-03-21 RX ORDER — DEXTROSE MONOHYDRATE 25 G/50ML
12.5-25 INJECTION, SOLUTION INTRAVENOUS PRN
Status: DISCONTINUED | OUTPATIENT
Start: 2025-03-21 | End: 2025-03-21

## 2025-03-21 RX ORDER — SIMVASTATIN 20 MG
20 TABLET ORAL EVERY EVENING
Status: DISCONTINUED | OUTPATIENT
Start: 2025-03-21 | End: 2025-03-22

## 2025-03-21 RX ORDER — ACETAMINOPHEN 325 MG/1
650 TABLET ORAL EVERY 6 HOURS PRN
Status: DISCONTINUED | OUTPATIENT
Start: 2025-03-21 | End: 2025-03-22

## 2025-03-21 RX ORDER — HEPARIN SODIUM 5000 [USP'U]/100ML
0-30 INJECTION, SOLUTION INTRAVENOUS CONTINUOUS
Status: DISCONTINUED | OUTPATIENT
Start: 2025-03-21 | End: 2025-03-23

## 2025-03-21 RX ORDER — NOREPINEPHRINE BITARTRATE 0.03 MG/ML
0-1 INJECTION, SOLUTION INTRAVENOUS CONTINUOUS
Status: DISCONTINUED | OUTPATIENT
Start: 2025-03-21 | End: 2025-03-22

## 2025-03-21 RX ORDER — INSULIN GLARGINE 100 [IU]/ML
10 INJECTION, SOLUTION SUBCUTANEOUS EVERY MORNING
COMMUNITY
Start: 2025-01-09

## 2025-03-21 RX ORDER — PHENOL 1.4 %
10 AEROSOL, SPRAY (ML) MUCOUS MEMBRANE
COMMUNITY

## 2025-03-21 RX ORDER — INSULIN LISPRO 100 [IU]/ML
1-6 INJECTION, SOLUTION INTRAVENOUS; SUBCUTANEOUS EVERY 6 HOURS
Status: DISCONTINUED | OUTPATIENT
Start: 2025-03-22 | End: 2025-03-22

## 2025-03-21 RX ORDER — ALBUTEROL SULFATE 90 UG/1
2 INHALANT RESPIRATORY (INHALATION)
Status: DISCONTINUED | OUTPATIENT
Start: 2025-03-21 | End: 2025-03-21

## 2025-03-21 RX ORDER — AMOXICILLIN 250 MG
2 CAPSULE ORAL EVERY EVENING
Status: DISCONTINUED | OUTPATIENT
Start: 2025-03-21 | End: 2025-03-26 | Stop reason: HOSPADM

## 2025-03-21 RX ORDER — DAPAGLIFLOZIN 10 MG/1
10 TABLET, FILM COATED ORAL DAILY
Status: DISCONTINUED | OUTPATIENT
Start: 2025-03-22 | End: 2025-03-26 | Stop reason: HOSPADM

## 2025-03-21 RX ORDER — FUROSEMIDE 10 MG/ML
80 INJECTION INTRAMUSCULAR; INTRAVENOUS
Status: DISCONTINUED | OUTPATIENT
Start: 2025-03-21 | End: 2025-03-22

## 2025-03-21 RX ORDER — ASPIRIN 81 MG/1
81 TABLET, CHEWABLE ORAL DAILY
Status: DISCONTINUED | OUTPATIENT
Start: 2025-03-21 | End: 2025-03-23

## 2025-03-21 RX ORDER — ASCORBIC ACID 500 MG
500 TABLET ORAL DAILY
COMMUNITY

## 2025-03-21 RX ORDER — IPRATROPIUM BROMIDE AND ALBUTEROL SULFATE 2.5; .5 MG/3ML; MG/3ML
3 SOLUTION RESPIRATORY (INHALATION)
Status: DISCONTINUED | OUTPATIENT
Start: 2025-03-21 | End: 2025-03-26 | Stop reason: HOSPADM

## 2025-03-21 RX ORDER — FUROSEMIDE 10 MG/ML
80 INJECTION INTRAMUSCULAR; INTRAVENOUS ONCE
Status: COMPLETED | OUTPATIENT
Start: 2025-03-21 | End: 2025-03-21

## 2025-03-21 RX ORDER — HEPARIN SODIUM 1000 [USP'U]/ML
80 INJECTION, SOLUTION INTRAVENOUS; SUBCUTANEOUS ONCE
Status: COMPLETED | OUTPATIENT
Start: 2025-03-21 | End: 2025-03-21

## 2025-03-21 RX ORDER — LIDOCAINE 4 G/G
1 PATCH TOPICAL EVERY 24 HOURS
Status: DISCONTINUED | OUTPATIENT
Start: 2025-03-21 | End: 2025-03-26 | Stop reason: HOSPADM

## 2025-03-21 RX ORDER — OXYCODONE HYDROCHLORIDE 5 MG/1
5 TABLET ORAL EVERY 6 HOURS PRN
Refills: 0 | Status: DISCONTINUED | OUTPATIENT
Start: 2025-03-21 | End: 2025-03-26 | Stop reason: HOSPADM

## 2025-03-21 RX ORDER — HYDROXYZINE HYDROCHLORIDE 10 MG/1
10 TABLET, FILM COATED ORAL EVERY 4 HOURS PRN
Status: DISCONTINUED | OUTPATIENT
Start: 2025-03-21 | End: 2025-03-21

## 2025-03-21 RX ORDER — OXYCODONE HYDROCHLORIDE 5 MG/1
5 TABLET ORAL EVERY 4 HOURS PRN
Refills: 0 | Status: DISCONTINUED | OUTPATIENT
Start: 2025-03-21 | End: 2025-03-21

## 2025-03-21 RX ORDER — ACETAMINOPHEN 500 MG
500-1000 TABLET ORAL EVERY 6 HOURS PRN
COMMUNITY

## 2025-03-21 RX ORDER — ENOXAPARIN SODIUM 100 MG/ML
40 INJECTION SUBCUTANEOUS DAILY
Status: DISCONTINUED | OUTPATIENT
Start: 2025-03-22 | End: 2025-03-21

## 2025-03-21 RX ORDER — HYDROXYZINE HYDROCHLORIDE 10 MG/1
10 TABLET, FILM COATED ORAL EVERY 6 HOURS PRN
Status: DISCONTINUED | OUTPATIENT
Start: 2025-03-21 | End: 2025-03-26 | Stop reason: HOSPADM

## 2025-03-21 RX ORDER — INSULIN LISPRO 100 [IU]/ML
2-9 INJECTION, SOLUTION INTRAVENOUS; SUBCUTANEOUS EVERY 6 HOURS
Status: DISCONTINUED | OUTPATIENT
Start: 2025-03-21 | End: 2025-03-21

## 2025-03-21 RX ORDER — POLYETHYLENE GLYCOL 3350 17 G/17G
1 POWDER, FOR SOLUTION ORAL
Status: DISCONTINUED | OUTPATIENT
Start: 2025-03-21 | End: 2025-03-26 | Stop reason: HOSPADM

## 2025-03-21 RX ORDER — DEXTROSE MONOHYDRATE 25 G/50ML
25 INJECTION, SOLUTION INTRAVENOUS
Status: DISCONTINUED | OUTPATIENT
Start: 2025-03-21 | End: 2025-03-21

## 2025-03-21 RX ORDER — HEPARIN SODIUM 1000 [USP'U]/ML
40 INJECTION, SOLUTION INTRAVENOUS; SUBCUTANEOUS PRN
Status: DISCONTINUED | OUTPATIENT
Start: 2025-03-21 | End: 2025-03-23

## 2025-03-21 RX ORDER — DEXTROSE MONOHYDRATE 25 G/50ML
25 INJECTION, SOLUTION INTRAVENOUS
Status: DISCONTINUED | OUTPATIENT
Start: 2025-03-21 | End: 2025-03-22

## 2025-03-21 RX ORDER — DAPAGLIFLOZIN 5 MG/1
5 TABLET, FILM COATED ORAL DAILY
Status: ON HOLD | COMMUNITY
Start: 2025-03-03 | End: 2025-03-26

## 2025-03-21 RX ADMIN — ASPIRIN 81 MG: 81 TABLET, CHEWABLE ORAL at 15:58

## 2025-03-21 RX ADMIN — HEPARIN SODIUM 5600 UNITS: 1000 INJECTION, SOLUTION INTRAVENOUS; SUBCUTANEOUS at 17:51

## 2025-03-21 RX ADMIN — HEPARIN SODIUM 18 UNITS/KG/HR: 5000 INJECTION, SOLUTION INTRAVENOUS at 17:51

## 2025-03-21 RX ADMIN — INSULIN GLARGINE-YFGN 5 UNITS: 100 INJECTION, SOLUTION SUBCUTANEOUS at 23:53

## 2025-03-21 RX ADMIN — LIDOCAINE 1 PATCH: 4 PATCH TOPICAL at 17:47

## 2025-03-21 RX ADMIN — SIMVASTATIN 20 MG: 20 TABLET, FILM COATED ORAL at 17:45

## 2025-03-21 RX ADMIN — OXYCODONE 5 MG: 5 TABLET ORAL at 18:42

## 2025-03-21 RX ADMIN — FLUOXETINE HYDROCHLORIDE 20 MG: 20 CAPSULE ORAL at 15:58

## 2025-03-21 RX ADMIN — SODIUM CHLORIDE 3 UNITS: 9 INJECTION, SOLUTION INTRAVENOUS at 16:38

## 2025-03-21 RX ADMIN — FUROSEMIDE 80 MG: 10 INJECTION, SOLUTION INTRAVENOUS at 14:19

## 2025-03-21 RX ADMIN — HYDROXYZINE HYDROCHLORIDE 10 MG: 10 TABLET ORAL at 17:46

## 2025-03-21 RX ADMIN — SENNOSIDES AND DOCUSATE SODIUM 2 TABLET: 50; 8.6 TABLET ORAL at 17:45

## 2025-03-21 RX ADMIN — ACETAMINOPHEN 650 MG: 325 TABLET, FILM COATED ORAL at 14:19

## 2025-03-21 RX ADMIN — SODIUM CHLORIDE 3.5 UNITS/HR: 9 INJECTION, SOLUTION INTRAVENOUS at 15:44

## 2025-03-21 RX ADMIN — FUROSEMIDE 80 MG: 10 INJECTION, SOLUTION INTRAVENOUS at 16:45

## 2025-03-21 RX ADMIN — INSULIN LISPRO 6 UNITS: 100 INJECTION, SOLUTION INTRAVENOUS; SUBCUTANEOUS at 14:22

## 2025-03-21 SDOH — ECONOMIC STABILITY: TRANSPORTATION INSECURITY
IN THE PAST 12 MONTHS, HAS LACK OF RELIABLE TRANSPORTATION KEPT YOU FROM MEDICAL APPOINTMENTS, MEETINGS, WORK OR FROM GETTING THINGS NEEDED FOR DAILY LIVING?: NO

## 2025-03-21 SDOH — ECONOMIC STABILITY: TRANSPORTATION INSECURITY
IN THE PAST 12 MONTHS, HAS THE LACK OF TRANSPORTATION KEPT YOU FROM MEDICAL APPOINTMENTS OR FROM GETTING MEDICATIONS?: NO

## 2025-03-21 ASSESSMENT — COGNITIVE AND FUNCTIONAL STATUS - GENERAL
WALKING IN HOSPITAL ROOM: A LITTLE
DAILY ACTIVITIY SCORE: 24
SUGGESTED CMS G CODE MODIFIER DAILY ACTIVITY: CH
SUGGESTED CMS G CODE MODIFIER MOBILITY: CJ
MOBILITY SCORE: 22
MOVING FROM LYING ON BACK TO SITTING ON SIDE OF FLAT BED: A LITTLE

## 2025-03-21 ASSESSMENT — PAIN DESCRIPTION - PAIN TYPE
TYPE: ACUTE PAIN

## 2025-03-21 ASSESSMENT — ENCOUNTER SYMPTOMS
PSYCHIATRIC NEGATIVE: 1
HEMOPTYSIS: 0
FEVER: 0
EYES NEGATIVE: 1
SHORTNESS OF BREATH: 1
NEUROLOGICAL NEGATIVE: 1
DIAPHORESIS: 0
HEMATOCHEZIA: 0
COUGH: 0
MUSCULOSKELETAL NEGATIVE: 1
GASTROINTESTINAL NEGATIVE: 1
WHEEZING: 0

## 2025-03-21 ASSESSMENT — SOCIAL DETERMINANTS OF HEALTH (SDOH)
IN THE PAST 12 MONTHS, HAS THE ELECTRIC, GAS, OIL, OR WATER COMPANY THREATENED TO SHUT OFF SERVICE IN YOUR HOME?: NO
WITHIN THE LAST YEAR, HAVE YOU BEEN KICKED, HIT, SLAPPED, OR OTHERWISE PHYSICALLY HURT BY YOUR PARTNER OR EX-PARTNER?: NO
WITHIN THE PAST 12 MONTHS, YOU WORRIED THAT YOUR FOOD WOULD RUN OUT BEFORE YOU GOT THE MONEY TO BUY MORE: NEVER TRUE
WITHIN THE PAST 12 MONTHS, THE FOOD YOU BOUGHT JUST DIDN'T LAST AND YOU DIDN'T HAVE MONEY TO GET MORE: NEVER TRUE
WITHIN THE LAST YEAR, HAVE YOU BEEN AFRAID OF YOUR PARTNER OR EX-PARTNER?: NO
WITHIN THE LAST YEAR, HAVE TO BEEN RAPED OR FORCED TO HAVE ANY KIND OF SEXUAL ACTIVITY BY YOUR PARTNER OR EX-PARTNER?: NO
WITHIN THE LAST YEAR, HAVE YOU BEEN HUMILIATED OR EMOTIONALLY ABUSED IN OTHER WAYS BY YOUR PARTNER OR EX-PARTNER?: NO

## 2025-03-21 ASSESSMENT — LIFESTYLE VARIABLES
AVERAGE NUMBER OF DAYS PER WEEK YOU HAVE A DRINK CONTAINING ALCOHOL: 0
ALCOHOL_USE: NO
HOW MANY TIMES IN THE PAST YEAR HAVE YOU HAD 5 OR MORE DRINKS IN A DAY: 0
TOTAL SCORE: 0
ON A TYPICAL DAY WHEN YOU DRINK ALCOHOL HOW MANY DRINKS DO YOU HAVE: 0
TOTAL SCORE: 0
HAVE YOU EVER FELT YOU SHOULD CUT DOWN ON YOUR DRINKING: NO
EVER FELT BAD OR GUILTY ABOUT YOUR DRINKING: NO
DOES PATIENT WANT TO STOP DRINKING: NO
EVER HAD A DRINK FIRST THING IN THE MORNING TO STEADY YOUR NERVES TO GET RID OF A HANGOVER: NO
HAVE PEOPLE ANNOYED YOU BY CRITICIZING YOUR DRINKING: NO
TOTAL SCORE: 0
CONSUMPTION TOTAL: NEGATIVE

## 2025-03-21 ASSESSMENT — COPD QUESTIONNAIRES
HAVE YOU SMOKED AT LEAST 100 CIGARETTES IN YOUR ENTIRE LIFE: YES
DURING THE PAST 4 WEEKS HOW MUCH DID YOU FEEL SHORT OF BREATH: SOME OF THE TIME
DO YOU EVER COUGH UP ANY MUCUS OR PHLEGM?: NO/ONLY WITH OCCASIONAL COLDS OR INFECTIONS
COPD SCREENING SCORE: 5

## 2025-03-21 ASSESSMENT — PULMONARY FUNCTION TESTS: EPAP_CMH2O: 6

## 2025-03-21 NOTE — ED NOTES
"Medication history reviewed with patient.  Med rec is complete.  Allergies reviewed.     Patient denies any outpatient antibiotics in the last 30 days.     Anticoagulants taken in the last 14 days? No  Patient is on Aspirin 81 mg daily     Dispense history available in EPIC? Yes    Patient reports her primary discontinued Jardiance and replaced it with another medication. Patient stated that \"Farxiga\" or \"Dapagliflozin\" did not sound familiar. Last dispensed from pharmacy 3/3/25      Jet Thornton          "

## 2025-03-21 NOTE — PROGRESS NOTES
Cardiology Initial Consult Note    Date of note:    3/21/2025      Consulting Physician: LYNN Fernandez Jr.O.    Name:   Kimberly Castañeda     YOB: 1945  Age:   79 y.o.  female   MRN:   [id number]    Assessment/Recommendations:  1.  Troponin elevation.  This is likely type II non-ST elevation MI from her fall.  However she did not hit her chest.  Her current chest pain is not clear.  ECG shows no acute ST elevation changes.  Would consider starting her on heparin until we can trend out the troponin.  She does have a history of prior coronary artery disease and is a diabetic so has multiple risk factors for coronary artery disease.  I discussed the above with her and I discussed recommendations for cardiac catheterization with possible percutaneous coronary mention.  Discussed with her although she may wish to be a DO NOT INTUBATE DO NOT RESUSCITATE, this will have to be reversed during the procedure.  Patient would be agreeable to that.The risks, benefits, and alternatives to coronary angiography with possible percutaneous coronary intervention and IV sedation were discussed in great detail. Specific risks mentioned include bleeding that may require blood transfusion, kidney damage from IV contrast allergy, infection, allergic reaction, arterial damage that may require intervention or surgery, cardiac perforation with possible tamponade requiring honey-cardiocentesis or possible open heart surgery. We also discussed in great detail a stent is placed, they will have to be on two antiplatelet agents (blood thinners) for up to 1 year.  Verified with patient no planned surgeries/procedures are planned within the next year.  Verified with patient no recent bleeding.  In addition, we discussed that 10% of patients will experience small to moderate bruising at the side of the arterial puncture. Lastly the risks of heart attack, stroke, and death were discussed; the risks of major complications such as heart  attack or stroke caused by the angiogram is less than 1%; the risk of death is approximately 1 in 1000. The patient verbalized understanding of these potential complications and wishes to proceed with this procedure.   -trend troponin   -heparin gtt   -ASA 81 mg po qd   -recheck echo here (has funny murmur vs frictional rub)   -NPO after midnight for possible cath     2.  HFrEF.  Currently hypotensive, possible cardiogenic, but has elevated WBC too. Hold GDMT till off pressors (on norepi).  Patient is volume overloaded so agree with IV lasix diuresis.    3.  GLF, no lightheadedness or dizziness.      Disposition: 2 days or so    Reason for Consultation: heart failure with decreased LVEF new diagnosis    HPI:  Kimberly Castañeda is a 79 y.o. female with history of coronary artery disease, patient reports a remote history of stent placement for which she still takes aspirin, diabetes mellitus on insulin and Farxiga, hyperlipidemia on simvastatin, hypertension on beta-blocker, presented to the outside hospital after a fall.  Patient subsequently transferred over to [facility] for higher level of care due to respiratory distress, newly noted decreased LVEF, and hypotension.    The patient reports she was in her usual state of health until Wednesday, she went to take her medications and slipped on the floor and fell on her left knee.  She denies hitting her head or chest.  Her son who lives with her and then lifted her up under her arms.  She started noticing some chest discomfort/tightness yesterday.  It has continued.  Troponin at the outside hospital was mildly elevated.    She has had about 3 falls in the last couple years.  They have all been mechanical falls.  She denies any lightheadedness or dizziness.  All the falls have just been when she felt her legs were weak and gave out.  Her normal activities does include some gardening or yard work and some of the house chores.    Pertinent labs here shows a WBC of 16.8, sodium  "132, glucose is 410, creatinine is 0.79, initial troponin 219, proBNP is 23,215.    Cardiac Imaging and Procedures Review (personally reviewed and notable for):    EKG dated 3/21/25:  sinus rhythm, poor R wave progression, T wave inversions in the anterolateral leads, consider ischemia, low voltages in the limb leads, rightward axis, prolonged QT interval, compared to prior ECG of 10/21/2022, T wave inversions are new    Echo dated 10/21/2022: Normal left ventricular cavity size, wall thickness, and systolic function, visually estimated LVEF around 55%.  Reported grade 2 diastolic dysfunction.  Enlarged right ventricular static cavity size with low normal systolic function.  Biatrial enlargement.  Mitral annular calcification.  Sclerotic aortic valve leaflets.  Overall no significant valvular stenosis or regurgitation.  Aortic root is not well-visualized.    Patient had an outside echo report 3/21/2025 that was reported out as apical dilatation with severely decreased EF, 30 to 35%.  Right ventricle had hypokinetic apex.  No significant valvular stenosis or regurgitation.  Radiology test Review:  No orders to display       Physical Exam  Body mass index is 24.91 kg/m².  /66   Pulse 82   Temp 37.1 °C (98.7 °F) (Temporal)   Resp (!) 25   Ht 1.676 m (5' 6\")   Wt 70 kg (154 lb 5.2 oz)   SpO2 94%   Vitals:    03/21/25 1334 03/21/25 1343 03/21/25 1348 03/21/25 1356   BP: 100/66      Pulse: 90  86 82   Resp: (!) 28  (!) 26 (!) 25   Temp: 37.1 °C (98.7 °F)      TempSrc: Temporal      SpO2: 96%  91% 94%   Weight: 70.7 kg (155 lb 13.8 oz) 70 kg (154 lb 5.2 oz)     Height: 1.676 m (5' 6\")        Oxygen Therapy:  Pulse Oximetry: 94 %, O2 (LPM): 6, O2 Delivery Device: Silicone Nasal Cannula    Physical Exam  Constitutional:       Appearance: Normal appearance.   Eyes:      Extraocular Movements: Extraocular movements intact.      Conjunctiva/sclera: Conjunctivae normal.   Neck:      Vascular: JVD present. No carotid " bruit.   Cardiovascular:      Rate and Rhythm: Normal rate and regular rhythm.      Pulses:           Radial pulses are 2+ on the right side and 2+ on the left side.        Posterior tibial pulses are 1+ on the right side and 1+ on the left side.      Heart sounds: Normal heart sounds. No murmur heard.     No friction rub. No gallop.      Comments: ? Frictional rub  Pulmonary:      Effort: Pulmonary effort is normal. No respiratory distress.      Breath sounds: Examination of the right-lower field reveals rales. Examination of the left-lower field reveals rales. Rales present. No wheezing.   Abdominal:      General: Bowel sounds are normal.      Palpations: Abdomen is soft.   Musculoskeletal:      Cervical back: Neck supple.      Right lower leg: No edema.      Left lower leg: No edema.   Skin:     General: Skin is warm and dry.   Neurological:      Mental Status: She is alert and oriented to person, place, and time. Mental status is at baseline.   Psychiatric:         Mood and Affect: Mood normal.          Problem list:  Principal Problem:    Acute HFrEF (heart failure with reduced ejection fraction) ([facility]) (POA: Yes)  Active Problems:    Essential hypertension (POA: Yes)    Dyslipidemia (POA: Yes)    PAD (peripheral artery disease) ([facility]) (POA: Yes)    Type 2 diabetes mellitus with ophthalmic complication ([facility]) (POA: Yes)    Acute on chronic respiratory failure with hypoxia ([facility]) (POA: Yes)    Shock ([facility]) (POA: Unknown)    Accident due to mechanical fall without injury (POA: Unknown)    Chronic obstructive lung disease ([facility]) (POA: Unknown)    Leukocytosis (POA: Unknown)  Resolved Problems:    * No resolved hospital problems. *      Past Medical History:   Diagnosis Date    Abnormal mammogram 3/22/2018    Arthritis/arthropathy of multiple joints 12/12/2018    Back pain     BRBPR (bright red blood per rectum) 6/1/2017    Bronchitis     Cardiac arrhythmia     Cellulitis and abscess of hand 12/12/2018    Chickenpox      Controlled type 2 diabetes mellitus without complication (HCC) 4/27/2017    Coronary artery disease involving native coronary artery of native heart without angina pectoris 4/27/2017    Cough     Diarrhea     Dyslipidemia 4/27/2017    Essential hypertension 4/27/2017    Exposure 9/20/2017    [place] measles     GI bleed     H/O colonoscopy with polypectomy 6/1/2017    History of recent dental procedure 10/16/2019    Hypoxemia 4/27/2017    Lipoma of torso 7/12/2018    Low hemoglobin 10/16/2019    Mumps     Obesity     PAD (peripheral artery disease) (HCC) 4/27/2017    Painful joint     Post herpetic neuralgia 8/22/2019    Reactive depression 4/27/2017    Rhinitis     Shingles outbreak 06/2019    Sore muscles     Swelling of lower extremity     Tobacco abuse 4/27/2017    Tonsillitis     Type 2 diabetes mellitus with ophthalmic complication ([facility]) 3/22/2018    Valvular heart disease     Wears glasses     Weight loss     Wheezing        Past Surgical History:   Procedure Laterality Date    TN COLONOSCOPY,DIAGNOSTIC N/A 7/17/2021    Procedure: COLONOSCOPY;  Surgeon: Oh Ochoa M.D.;  Location: SURGERY [facility];  Service: Gastroenterology    TN COLONOSCPY,FLEX,W/DIR SUBMUC INJECT N/A 7/17/2021    Procedure: COLONOSCOPY, WITH SCLEROTHERAPY;  Surgeon: Oh Ochoa M.D.;  Location: SURGERY [facility];  Service: Gastroenterology    TN COLONOSCOPY,FLEX,W/CONTROL, BLEEDING N/A 7/17/2021    Procedure: COLONOSCOPY, WITH CLIP APPLICATION;  Surgeon: Oh Ochoa M.D.;  Location: SURGERY [facility];  Service: Gastroenterology    COLONOSCOPY WITH APC N/A 7/17/2021    Procedure: COLONOSCOPY, WITH ARGON PLASMA COAGULATION;  Surgeon: Oh Ochoa M.D.;  Location: SURGERY [facility];  Service: Gastroenterology    TN UPPER GI ENDOSCOPY,DIAGNOSIS N/A 7/16/2021    Procedure: GASTROSCOPY;  Surgeon: Vickey Mane M.D.;  Location: SURGERY SAME DAY [facility];  Service: Gastroenterology    TN UPPER GI  ENDOSCOPY,BIOPSY N/A 7/16/2021    Procedure: GASTROSCOPY, WITH BIOPSY;  Surgeon: Vickey Mane M.D.;  Location: SURGERY SAME DAY [facility];  Service: Gastroenterology    STENT PLACEMENT  2007    cardiac stent    HYSTERECTOMY LAPAROSCOPY  1988    TUBAL LIGATION  1983    APPENDECTOMY      TONSILLECTOMY      VEIN STRIPPING         Medications Prior to Admission   Medication Sig Dispense Refill Last Dose/Taking    LANTUS SOLOSTAR 100 UNIT/ML Solution Pen-injector injection Inject 10 Units under the skin every morning.   3/20/2025 Morning    Melatonin 10 MG Tab Take 10 mg by mouth at bedtime as needed.   3/19/2025 Bedtime    ascorbic acid (VITAMIN C) 500 MG tablet Take 500 mg by mouth every day.   3/20/2025 Morning    furosemide (LASIX) 20 MG Tab Take 1 Tablet by mouth every day. Hold for sbp less 105 30 Tablet 0 3/20/2025 Morning    potassium chloride SA (K-DUR) 10 MEQ Tab CR Take 1 Tablet by mouth every day. 30 Tablet 0 3/20/2025 Morning    metFORMIN (GLUCOPHAGE) 850 MG Tab Take 850 mg by mouth 3 times a day with meals.   3/20/2025 Morning    benazepril (LOTENSIN) 20 MG Tab Take 20 mg by mouth every day.   3/20/2025 Morning    CALCIUM PO Take 1 Tablet by mouth every day.   3/20/2025 Morning    Coenzyme Q10 (CO Q 10 PO) Take 1 Tablet by mouth every day.   3/20/2025 Morning    aspirin (ASA) 81 MG Chew Tab chewable tablet Chew 1 tablet every day. 100 tablet 0 3/20/2025 Morning    pioglitazone (ACTOS) 45 MG Tab Take 45 mg by mouth every morning.   3/20/2025 Morning    simvastatin (ZOCOR) 20 MG Tab Take 1 Tab by mouth every evening. 30 Tab 8 3/19/2025 Evening    metoprolol (LOPRESSOR) 25 MG Tab Take 1 Tab by mouth 2 times a day. 60 Tab 8 3/20/2025 Morning    FLUoxetine (PROZAC) 20 MG Cap Take 1 Cap by mouth every day. 30 Cap 8 3/20/2025 Morning    acetaminophen (TYLENOL) 500 MG Tab Take 500-1,000 mg by mouth every 6 hours as needed for Mild Pain.   Unknown    dapagliflozin propanediol (FARXIGA) 5 MG Tab Take 5 mg by mouth  every day.   Unknown    nitroglycerin (NITROSTAT) 0.4 MG SL Tab Place 1 Tab under tongue as needed for Chest Pain. 25 Tab 1     NON SPECIFIED Portable Oxygen  Dx: COPD 1 Each 11      Current Facility-Administered Medications   Medication Dose Route Frequency Provider Last Rate Last Admin    Respiratory Therapy Consult   Nebulization Continuous RT Elian Oconnor M.D.        [START ON 3/22/2025] enoxaparin (Lovenox) inj 40 mg  40 mg Subcutaneous DAILY AT 1800 Elian Oconnor M.D.        acetaminophen (Tylenol) tablet 650 mg  650 mg Oral Q6HRS PRN Elian Oconnor M.D.   650 mg at 03/21/25 1419    senna-docusate (Pericolace Or Senokot S) 8.6-50 MG per tablet 2 Tablet  2 Tablet Oral Q EVENING Elian Oconnor M.D.        And    polyethylene glycol/lytes (Miralax) Packet 1 Packet  1 Packet Oral QDAY PRN Elian Oconnor M.D.        norepinephrine (Levophed) 8 mg in 250 mL NS infusion (premix)  0-1 mcg/kg/min (Ideal) Intravenous Continuous Elian Oconnor M.D. 5.6 mL/hr at 03/21/25 1445 0.05 mcg/kg/min at 03/21/25 1445    aspirin (Asa) chewable tab 81 mg  81 mg Oral DAILY Naveen Stewart M.D.   81 mg at 03/21/25 1558    [START ON 3/22/2025] dapagliflozin propanediol (Farxiga) tablet 10 mg  10 mg Oral DAILY Naveen Stewart M.D.        FLUoxetine (PROzac) capsule 20 mg  20 mg Oral DAILY Naveen Stewart M.D.   20 mg at 03/21/25 1558    simvastatin (Zocor) tablet 20 mg  20 mg Oral Q EVENING Naveen Stewart M.D.        fluticasone-umeclidinium-vilanterol (Trelegy Ellipta) 200-62.5-25 mcg/act inhaler 1 Puff  1 Puff Inhalation QDAILY (RT) Naveen Stewart M.D.        ipratropium-albuterol (DUONEB) nebulizer solution  3 mL Nebulization Q4H PRN (RT) Naveen Stewart M.D.        insulin regular (HumuLIN R/NovoLIN R) 100 Units in  mL infusion premix  0-29 Units/hr Intravenous Continuous Naveen Stewart M.D. 3.5 mL/hr at 03/21/25 1544 3.5 Units/hr at 03/21/25 1544    dextrose 50 %  (D50W) injection 12.5-25 g  12.5-25 g Intravenous PRN Naveen Stewart M.D.        furosemide (Lasix) injection 80 mg  80 mg Intravenous BID DIURETIC Naveen Stewart M.D.        insulin regular (HumuLIN R/NovoLIN R) 1 unit/mL syringe (IV ONLY) 3 Units  3 Units Intravenous Once Naveen Stewart M.D.           Allergies   Allergen Reactions    Aldactone [Kdc:Yellow Dye+Spironolactone] Palpitations    Percocet [Apap-Fd&C Red #40 [place]-Oxycodone] Rash     Full body  Patient states she can take if she also takes Benadryl  Tolerated acetaminophen 2020       Family History   Problem Relation Age of Onset    Cancer Mother         rectal    Arthritis Father     Alcohol/Drug Father     Heart Disease Father     Diabetes Brother     Cancer Maternal Aunt         colon       Social History     Socioeconomic History    Marital status:      Spouse name: Not on file    Number of children: Not on file    Years of education: Not on file    Highest education level: Not on file   Occupational History    Not on file   Tobacco Use    Smoking status: Former     Current packs/day: 0.25     Average packs/day: 0.3 packs/day for 60.0 years (15.0 ttl pk-yrs)     Types: Cigarettes    Smokeless tobacco: Never   Vaping Use    Vaping status: Never Used   Substance and Sexual Activity    Alcohol use: No    Drug use: No    Sexual activity: Never   Other Topics Concern    Not on file   Social History Narrative    Not on file     Social Drivers of Health     Financial Resource Strain: Not on file   Food Insecurity: No Food Insecurity (3/21/2025)    Hunger Vital Sign     Worried About Running Out of Food in the Last Year: Never true     Ran Out of Food in the Last Year: Never true   Transportation Needs: No Transportation Needs (3/21/2025)    PRAPARE - Transportation     Lack of Transportation (Medical): No     Lack of Transportation (Non-Medical): No   Physical Activity: Not on file   Stress: Not on file   Social Connections: Not on  file   Intimate Partner Violence: Not At Risk (3/21/2025)    Humiliation, Afraid, Rape, and Kick questionnaire     Fear of Current or Ex-Partner: No     Emotionally Abused: No     Physically Abused: No     Sexually Abused: No   Housing Stability: Low Risk  (3/21/2025)    Housing Stability Vital Sign     Unable to Pay for Housing in the Last Year: No     Number of Times Moved in the Last Year: 0     Homeless in the Last Year: No         Intake/Output Summary (Last 24 hours) at 3/21/2025 1615  Last data filed at 3/21/2025 1544  Gross per 24 hour   Intake 5.45 ml   Output 700 ml   Net -694.55 ml         Review of Systems   Constitutional: Negative for diaphoresis and fever.   Respiratory:  Negative for cough, hemoptysis and wheezing.    Gastrointestinal:  Negative for hematochezia and melena.   Genitourinary:  Negative for hematuria.        Labs (personally reviewed and notable for):   Lab Results   Component Value Date/Time    SODIUM 132 (L) 03/21/2025 01:45 PM    POTASSIUM 5.1 03/21/2025 01:45 PM    CHLORIDE 95 (L) 03/21/2025 01:45 PM    CO2 17 (L) 03/21/2025 01:45 PM    GLUCOSE 410 (H) 03/21/2025 01:45 PM    BUN 33 (H) 03/21/2025 01:45 PM    CREATININE 0.79 03/21/2025 01:45 PM    BUNCREATRAT 34 (H) 12/03/2019 01:48 PM      Lab Results   Component Value Date/Time    WBC 16.8 (H) 03/21/2025 01:45 PM    RBC 4.24 03/21/2025 01:45 PM    HEMOGLOBIN 13.1 03/21/2025 01:45 PM    HEMATOCRIT 42.5 03/21/2025 01:45 PM    .2 (H) 03/21/2025 01:45 PM    MCH 30.9 03/21/2025 01:45 PM    MCHC 30.8 (L) 03/21/2025 01:45 PM    MPV 10.2 03/21/2025 01:45 PM    NEUTSPOLYS 86.30 (H) 03/21/2025 01:45 PM    LYMPHOCYTES 4.60 (L) 03/21/2025 01:45 PM    MONOCYTES 7.60 03/21/2025 01:45 PM    EOSINOPHILS 0.60 03/21/2025 01:45 PM    BASOPHILS 0.20 03/21/2025 01:45 PM    ANISOCYTOSIS 1+ 07/15/2021 06:10 PM    INR 1.05 07/15/2021 08:50 PM      Lab Results   Component Value Date/Time    CHOLSTRLTOT 173 03/21/2025 01:45 PM    LDL 58 03/21/2025  01:45 PM     03/21/2025 01:45 PM    TRIGLYCERIDE 44 03/21/2025 01:45 PM       Lab Results   Component Value Date/Time    TROPONINT 219 (H) 03/21/2025 1345     Lab Results   Component Value Date/Time    NTPROBNP 27795 (H) 03/21/2025 1345     Lab Results   Component Value Date/Time    HBA1C 12.0 (H) 03/21/2025 1345     Thank you for allowing me to participate in the care of this patient.  Please contact me with any questions.    Matilda House M.D.  Cardiologist, [facility] [place]     This note was generated using voice recognition software which has a small chance of producing errors of grammar and possibly content. I have made every reasonable attempt to find and correct any obvious errors, but expect that some may not be found prior to finalization of this note.

## 2025-03-21 NOTE — H&P
"[facility] Internal Medicine History & Physical Note    Date of Service  3/21/2025    R Team: R ICU Gold Team   Attending: Dr. Elian Oconnor  Senior Resident: Dr. Stewart  Contact Number: [phone]    Primary Care Physician  Pcp Not In Computer    Consultants  critical care    Specialist Names: Dr. Elian Oconnor    Code Status  DNAR/DNI    Chief Complaint  Shortness of breath, fatigue, recent GLF.    History of Presenting Illness (HPI):   Kimberly Castañeda is a 79 y.o. female with a past medical history of COPD w/ chronic hypoxic respiratory failure on 2L O2 at home , HFpEF, T2DM with insulin use, HTN, who presented 3/21/2025 as a transfer from OSH, where she went after suffering from a ground level fall. She reports that he fall occurred because her knees \"gave out\", and denies losing consciousness, developing dizziness or lightheadedness before falling. Pt remembers the events of the fall, and took the team through the events. Pt reports that ever since the fall, she developed shortness of breath, fatigue. Pt reports that before this, she was not short of breath at all. Mentions that she has noted some mild swelling of her legs over the last 3 weeks, but no associated SOB, orthopnea, PND that were occurring at that time. Denies fevers, chills, chest pain, n/v/d/c/abdominal pain. Pt was transferred to [facility] due to no cardiology service available at the OSH.    At the OSH, cbc revealed WBC 13.2, Hb 12.7, CMP reveald Na 131, K+ 5.0, HCO3 21, AST 47, BUN 35, Glucose 348, Cr. 0.80, Anion gap 17. Trops elevated at 0.784. Pt underwent echo at OSH, revealed EF 30%, global akinesis. PT transferred to [facility] for further work up    I discussed the plan of care with  Dr. Elian Oconnor .    Review of Systems  Review of Systems   Constitutional:  Positive for malaise/fatigue.   HENT: Negative.     Eyes: Negative.    Respiratory:  Positive for shortness of breath.    Cardiovascular:  Positive for leg swelling. "   Gastrointestinal: Negative.    Genitourinary: Negative.    Musculoskeletal: Negative.    Skin: Negative.    Neurological: Negative.    Endo/Heme/Allergies: Negative.    Psychiatric/Behavioral: Negative.         Past Medical History   has a past medical history of Abnormal mammogram (3/22/2018), Arthritis/arthropathy of multiple joints (12/12/2018), Back pain, BRBPR (bright red blood per rectum) (6/1/2017), Bronchitis, Cardiac arrhythmia, Cellulitis and abscess of hand (12/12/2018), Chickenpox, Controlled type 2 diabetes mellitus without complication (HCC) (4/27/2017), Coronary artery disease involving native coronary artery of native heart without angina pectoris (4/27/2017), Cough, Diarrhea, Dyslipidemia (4/27/2017), Essential hypertension (4/27/2017), Exposure (9/20/2017), [place] measles, GI bleed, H/O colonoscopy with polypectomy (6/1/2017), History of recent dental procedure (10/16/2019), Hypoxemia (4/27/2017), Lipoma of torso (7/12/2018), Low hemoglobin (10/16/2019), Mumps, Obesity, PAD (peripheral artery disease) ([facility]) (4/27/2017), Painful joint, Post herpetic neuralgia (8/22/2019), Reactive depression (4/27/2017), Rhinitis, Shingles outbreak (06/2019), Sore muscles, Swelling of lower extremity, Tobacco abuse (4/27/2017), Tonsillitis, Type 2 diabetes mellitus with ophthalmic complication ([facility]) (3/22/2018), Valvular heart disease, Wears glasses, Weight loss, and Wheezing.    Surgical History   has a past surgical history that includes stent placement (2007); tubal ligation (1983); hysterectomy laparoscopy (1988); appendectomy; vein stripping; tonsillectomy; pr upper gi endoscopy,diagnosis (N/A, 7/16/2021); pr upper gi endoscopy,biopsy (N/A, 7/16/2021); pr colonoscopy-flexible (N/A, 7/17/2021); pr colonoscpy,flex,w/dir submuc inject (N/A, 7/17/2021); pr colonoscopy,flex,w/control, bleeding (N/A, 7/17/2021); and colonoscopy with apc (N/A, 7/17/2021).     Family History  family history includes Alcohol/Drug in  her father; Arthritis in her father; Cancer in her maternal aunt and mother; Diabetes in her brother; Heart Disease in her father.   Family history reviewed with patient.     Social History  Tobacco: 15 pack year smoking history, quit in 2017,  Alcohol: None  Recreational drugs (illegal or prescription): None  Living Situation: Lives at home with son  Primary Care Provider: Not Reviewed  Other (stressors, spirituality, exposures): Recent GLF in which she hurt her knee    Allergies  Allergies   Allergen Reactions    Aldactone [Kdc:Yellow Dye+Spironolactone] Palpitations    Percocet [Apap-Fd&C Red #40 [place]-Oxycodone] Rash     Full body  Patient states she can take if she also takes Benadryl  Tolerated acetaminophen 2020       Medications  Prior to Admission Medications   Prescriptions Last Dose Informant Patient Reported? Taking?   CALCIUM PO  Patient Yes No   Sig: Take 1 Tablet by mouth every day.   Coenzyme Q10 (CO Q 10 PO)  Patient Yes No   Sig: Take 1 Tablet by mouth every day.   Empagliflozin (JARDIANCE) 25 MG Tab  Patient Yes No   Sig: Take 25 mg by mouth every day.   FLUoxetine (PROZAC) 20 MG Cap  Patient No No   Sig: Take 1 Cap by mouth every day.   NON SPECIFIED  Patient No No   Sig: Portable Oxygen  Dx: COPD   aspirin (ASA) 81 MG Chew Tab chewable tablet  Patient No No   Sig: Chew 1 tablet every day.   benazepril (LOTENSIN) 40 MG tablet  Patient Yes No   Sig: Take 40 mg by mouth every day.   furosemide (LASIX) 20 MG Tab   No No   Sig: Take 1 Tablet by mouth every day. Hold for sbp less 105   glipiZIDE (GLUCOTROL) 10 MG Tab  Patient Yes No   Sig: Take 10 mg by mouth 2 times a day.   metFORMIN (GLUCOPHAGE) 850 MG Tab  Patient Yes No   Sig: Take 850 mg by mouth 3 times a day with meals.   metoprolol (LOPRESSOR) 25 MG Tab  Patient No No   Sig: Take 1 Tab by mouth 2 times a day.   nitroglycerin (NITROSTAT) 0.4 MG SL Tab  Patient No No   Sig: Place 1 Tab under tongue as needed for Chest Pain.   pioglitazone  (ACTOS) 45 MG Tab  Patient Yes No   Sig: Take 45 mg by mouth every morning.   potassium chloride SA (K-DUR) 10 MEQ Tab CR   No No   Sig: Take 1 Tablet by mouth every day.   simvastatin (ZOCOR) 20 MG Tab  Patient No No   Sig: Take 1 Tab by mouth every evening.      Facility-Administered Medications: None       Physical Exam  Temp:  [37.1 °C (98.7 °F)] 37.1 °C (98.7 °F)  Pulse:  [82-93] 82  Resp:  [25-28] 25  BP: (100)/(66) 100/66  SpO2:  [91 %-99 %] 94 %  Blood Pressure : 100/66       Pulse: 90   Respiration: (!) 28   Pulse Oximetry: 96 %       Physical Exam  Constitutional:       General: She is not in acute distress.     Appearance: Normal appearance. She is normal weight. She is not ill-appearing, toxic-appearing or diaphoretic.   HENT:      Right Ear: External ear normal.      Left Ear: External ear normal.      Nose: Nose normal.      Mouth/Throat:      Mouth: Mucous membranes are moist.   Eyes:      Extraocular Movements: Extraocular movements intact.      Conjunctiva/sclera: Conjunctivae normal.      Pupils: Pupils are equal, round, and reactive to light.   Cardiovascular:      Rate and Rhythm: Normal rate and regular rhythm.      Pulses: Normal pulses.      Heart sounds: Normal heart sounds.   Pulmonary:      Effort: Pulmonary effort is normal.      Breath sounds: Normal breath sounds. No wheezing or rales.   Abdominal:      General: Bowel sounds are normal. There is no distension.      Palpations: Abdomen is soft. There is no mass.      Tenderness: There is no abdominal tenderness.      Hernia: No hernia is present.   Musculoskeletal:         General: Normal range of motion.      Cervical back: Normal range of motion.      Right lower leg: No edema.      Left lower leg: No edema.   Skin:     General: Skin is warm.   Neurological:      General: No focal deficit present.      Mental Status: She is alert and oriented to person, place, and time. Mental status is at baseline.      Cranial Nerves: No cranial nerve  "deficit.      Sensory: No sensory deficit.      Motor: No weakness.      Coordination: Coordination normal.      Gait: Gait normal.      Deep Tendon Reflexes: Reflexes normal.   Psychiatric:         Mood and Affect: Mood normal.         Behavior: Behavior normal.         Thought Content: Thought content normal.         Judgment: Judgment normal.         Laboratory:  Recent Labs     03/21/25  1345   WBC 16.8*   RBC 4.24   HEMOGLOBIN 13.1   HEMATOCRIT 42.5   .2*   MCH 30.9   MCHC 30.8*   RDW 57.9*   PLATELETCT 260   MPV 10.2     Recent Labs     03/21/25  1345   SODIUM 132*   POTASSIUM 5.1   CHLORIDE 95*   CO2 17*   GLUCOSE 410*   BUN 33*   CREATININE 0.79   CALCIUM 9.5     Recent Labs     03/21/25  1345   ALTSGPT 21   ASTSGOT 35   ALKPHOSPHAT 56   TBILIRUBIN 0.4   GLUCOSE 410*         Recent Labs     03/21/25  1345   NTPROBNP 08593*         No results for input(s): \"TROPONINT\" in the last 72 hours.    Imaging:  No orders to display       X-Ray:  I have personally reviewed the images and compared with prior images.  EKG:  I have personally reviewed the images and compared with prior images.    Assessment/Plan:  Problem Representation:   I anticipate this patient will require at least two midnights for appropriate medical management, necessitating inpatient admission because requiring pressors for shock.    Patient will need a ICU (Adult and Pediatrics) bed on MEDICAL service .  The need is secondary to shock.    * Acute HFrEF (heart failure with reduced ejection fraction) ([facility])- (present on admission)  Assessment & Plan  New onset reduced EF seen on echo at OSH  IV Lasix 80 mg once dose  Cardiology consult for evaluation of NSTEMI Vs. Stress cardiomyopathy  Daily weights  Ios  proBNP  Repeat echocardiogram?  Outpt medication list includes jardiance 25 mg, benazepril 40, lasix 20 mg  Continue Jardiance    Leukocytosis  Assessment & Plan  Likely a leukemoid reaction in setting of acute systolic dysfunction, recent " GLF.    Chronic obstructive lung disease (HCC)  Assessment & Plan  Start trelegy inpt  Q4h PRN Duonebs  Home O2 use is 2 L  Low suspicion for exacerbation as no wheezing, cough. Additionally, presence of edematous findings more indicative of pulmonary edema.  Hold of Abx, steroids for now.    Accident due to mechanical fall without injury  Assessment & Plan  Mechanical GLF suffered, felt her knees gave out.  Imaging completed at OSH revealed no knee or hip fractures  Goal is to mobilize patient daily as tolerated to prevent ICU weakness  PT/OT when patient's presenting medical condition is stabilized    Shock ([facility])  Assessment & Plan  2/2 acute on chronic systolic + diastolic heart failure as seen on echo 3/21/25.  Levophed gtt  Hold off dobutamine, consider if no resolution  Avoid IVF due to reduced EF  Trend lactic acid.    Acute on chronic respiratory failure with hypoxia ([facility])- (present on admission)  Assessment & Plan  Likely 2/2 acute onset LV systolic dysfunction.  Patient tolerating oxygen via NC, increased from her home baseline of 2 L.      Type 2 diabetes mellitus with ophthalmic complication ([facility])- (present on admission)  Assessment & Plan  Pt reportedly uses oral + insulin at home  Continue Jardiance, hold metformin, pioglitazone, sitagliptin.  Insulin GTT in setting of Blood sugars >400, anion gap acidosis.  Consider addition of BHB if acidosis continues to worsen, anion gap increasing.  Risk strat w/ repeat A1c, lipid profile.    PAD (peripheral artery disease) ([facility])- (present on admission)  Assessment & Plan  Continue ASA 81    Dyslipidemia- (present on admission)  Assessment & Plan  Repeat lipid profile.    Essential hypertension- (present on admission)  Assessment & Plan  Prn antihypertensives at this time; patient is in shock.  Hold home antihypertensives        VTE prophylaxis: enoxaparin ppx

## 2025-03-21 NOTE — ASSESSMENT & PLAN NOTE
Pt reportedly uses oral + insulin at home  Continue Jardiance, hold metformin, pioglitazone, sitagliptin.  Insulin GTT in setting of Blood sugars >400, anion gap acidosis.  Consider addition of BHB if acidosis continues to worsen, anion gap increasing.  Risk strat w/ repeat A1c, lipid profile.

## 2025-03-21 NOTE — ASSESSMENT & PLAN NOTE
Start trelegy inpt  Q4h PRN Duonebs  Home O2 use is 2 L  Low suspicion for exacerbation as no wheezing, cough. Additionally, presence of edematous findings more indicative of pulmonary edema.  Hold of Abx, steroids for now.

## 2025-03-21 NOTE — ASSESSMENT & PLAN NOTE
2/2 acute on chronic systolic + diastolic heart failure as seen on echo 3/21/25.  Levophed gtt  Hold off dobutamine, consider if no resolution  Avoid IVF due to reduced EF  Trend lactic acid.

## 2025-03-21 NOTE — ASSESSMENT & PLAN NOTE
Likely 2/2 acute onset LV systolic dysfunction.  Patient tolerating oxygen via NC, increased from her home baseline of 2 L.

## 2025-03-21 NOTE — ASSESSMENT & PLAN NOTE
Patient on 2L NC at home  Weaned to 4L NC on arrival to ICU   Patient reports bilateral LE edema, 10 lbs weight gain, denies cough, wheezing, fevers, chills to suggest infection or AECOPD  CTA chest from OSH shows diffuse interlobular septal thickening, small R pleural effusion, no overt consolidations to suggest PNA  Procal at OSH very low    Plan  Diuresis w/ lasix 80 IV BID  Procal, MRSA nares, viral panel, cultures  Hold on abx or steroids  Nebs PRN

## 2025-03-21 NOTE — PROGRESS NOTES
[facility] INTENSIVIST DIRECT ADMISSION REPORT    Transferring facility: [facility]  Transferring physician: Dr Prabhakar  Chief complaint: dyspnea  Pertinent history & patient course: 79-year-old female with past medical history of HFrEF, COPD on chronic oxygen who presented to the outside hospital for dyspnea and malaise.  Thought to have a possible pneumonia and was admitted to the outside hospital for treatment.  She became hypotensive and more hypoxic requiring BiPAP and norepinephrine infusion.  Troponin was elevated therefore a echocardiogram was obtained which showed apical hypokinesis presumed to be new and elevated pulmonary pressures.  The sending facility has no cardiology available therefore they are requesting transfer for cardiology consultation and management of presumed cardiogenic shock.  Of note the patient was DNR/DNI 3 years ago during her last admission at our facility for heart failure at the sending physician confirms that she is currently full code.  Pertinent imaging & lab results: Chest x-ray with bilateral opacities, echocardiogram with apical hypokinesis, elevated troponin, normal renal function.  Code Status: Full per transferring provider, I personally verified with the transferring provider patient's code status and the transferring provider has confirmed this with the patient. I discussed that the patient was DNR/DNI 3 yrs ago and sending physician stated that hospitalist and ERP both confirmed FULL CODE  Further work up or recommendations prior to transfer: none  Consultants called prior to transfer and pertinent input from consultants: Cardiology  Patient accepted for transfer: yes  Consultants to be called upon arrival: Intensivist  Floor requested: CICU  ADT order placed for accepted patient: yes    Please inform the Intensivist upon assignment of an ICU bed and then again on arrival of the patient.

## 2025-03-21 NOTE — PROGRESS NOTES
4 Eyes Skin Assessment Completed by Ana RN and ALEXANDER Barrow and Samreen RN     Head WDL  Ears WDL  Nose Redness and Blanching, tender  Mouth Dry  Neck Back of neck red  Breast/Chest WDL  Shoulder Blades WDL  Spine WDL  (R) Arm/Elbow/Hand WDL  (L) Arm/Elbow/Hand WDL  Abdomen WDL  Groin WDL  Scrotum/Coccyx/Buttocks Redness and Blanching  (R) Leg Bruising and Swelling, R ankle  (L) Leg Swelling, red knee  (R) Heel/Foot/Toe Bruising and Swelling, boggy heels  (L) Heel/Foot/Toe Swelling, boggy heels           Devices In Places ECG, Blood Pressure Cuff, Pulse Ox, and Bipap      Interventions In Place Bipap Protecta-Gel, Heel Mepilex, Sacral Mepilex, TAP System, Elbow Mepilex, Q2 Turns, and Low Air Loss Mattress    Possible Skin Injury Yes    Pictures Uploaded Into Epic Yes  Wound Consult Placed Yes  RN Wound Prevention Protocol Ordered No

## 2025-03-21 NOTE — ASSESSMENT & PLAN NOTE
OSH echo shows hypokinesis of apex and mid LV segements with preserved bases  Most consistent with stress cardiomyopathy vs less likely NSTEMI    Lasix 80 IV BID  Will consult cards, trend trops, get formal echo  Heparin gtt for possible NSTEM  NPO for possible cath

## 2025-03-21 NOTE — ASSESSMENT & PLAN NOTE
New onset reduced EF seen on echo at OSH  IV Lasix 80 mg once dose  Cardiology consult for evaluation of NSTEMI Vs. Stress cardiomyopathy  Daily weights  Ios  proBNP  Repeat echocardiogram?  Outpt medication list includes jardiance 25 mg, benazepril 40, lasix 20 mg  Continue Jardiance

## 2025-03-21 NOTE — ASSESSMENT & PLAN NOTE
Mechanical GLF suffered, felt her knees gave out.  Imaging completed at OSH revealed no knee or hip fractures  Goal is to mobilize patient daily as tolerated to prevent ICU weakness  PT/OT when patient's presenting medical condition is stabilized

## 2025-03-22 ENCOUNTER — APPOINTMENT (OUTPATIENT)
Dept: CARDIOLOGY | Facility: MEDICAL CENTER | Age: 80
DRG: 321 | End: 2025-03-22
Attending: NURSE PRACTITIONER
Payer: MEDICARE

## 2025-03-22 PROBLEM — I73.9 PAD (PERIPHERAL ARTERY DISEASE) (HCC): Status: RESOLVED | Noted: 2017-04-27 | Resolved: 2025-03-22

## 2025-03-22 PROBLEM — R07.82 INTERCOSTAL PAIN: Status: ACTIVE | Noted: 2025-03-22

## 2025-03-22 PROBLEM — E87.1 HYPONATREMIA: Status: ACTIVE | Noted: 2025-03-22

## 2025-03-22 PROBLEM — I24.0 ACUTE CORONARY ARTERY OBSTRUCTION WITHOUT MI (HCC): Status: ACTIVE | Noted: 2025-03-22

## 2025-03-22 PROBLEM — I48.91 ATRIAL FIBRILLATION WITH RAPID VENTRICULAR RESPONSE (HCC): Status: ACTIVE | Noted: 2025-03-22

## 2025-03-22 PROBLEM — D72.829 LEUKOCYTOSIS: Status: RESOLVED | Noted: 2025-03-21 | Resolved: 2025-03-22

## 2025-03-22 LAB
ACT BLD: 205 SEC (ref 74–137)
ACT BLD: 256 SEC (ref 74–137)
ALBUMIN SERPL BCP-MCNC: 3.5 G/DL (ref 3.2–4.9)
ALBUMIN/GLOB SERPL: 1.5 G/DL
ALP SERPL-CCNC: 48 U/L (ref 30–99)
ALT SERPL-CCNC: 16 U/L (ref 2–50)
ANION GAP SERPL CALC-SCNC: 11 MMOL/L (ref 7–16)
AST SERPL-CCNC: 33 U/L (ref 12–45)
BASOPHILS # BLD AUTO: 0.3 % (ref 0–1.8)
BASOPHILS # BLD: 0.04 K/UL (ref 0–0.12)
BILIRUB SERPL-MCNC: 0.4 MG/DL (ref 0.1–1.5)
BUN SERPL-MCNC: 34 MG/DL (ref 8–22)
CALCIUM ALBUM COR SERPL-MCNC: 9.6 MG/DL (ref 8.5–10.5)
CALCIUM SERPL-MCNC: 9.2 MG/DL (ref 8.5–10.5)
CHLORIDE SERPL-SCNC: 97 MMOL/L (ref 96–112)
CO2 SERPL-SCNC: 24 MMOL/L (ref 20–33)
CREAT SERPL-MCNC: 0.68 MG/DL (ref 0.5–1.4)
EKG IMPRESSION: NORMAL
EOSINOPHIL # BLD AUTO: 0 K/UL (ref 0–0.51)
EOSINOPHIL NFR BLD: 0 % (ref 0–6.9)
ERYTHROCYTE [DISTWIDTH] IN BLOOD BY AUTOMATED COUNT: 57.8 FL (ref 35.9–50)
GFR SERPLBLD CREATININE-BSD FMLA CKD-EPI: 87 ML/MIN/1.73 M 2
GLOBULIN SER CALC-MCNC: 2.4 G/DL (ref 1.9–3.5)
GLUCOSE BLD STRIP.AUTO-MCNC: 113 MG/DL (ref 65–99)
GLUCOSE BLD STRIP.AUTO-MCNC: 122 MG/DL (ref 65–99)
GLUCOSE BLD STRIP.AUTO-MCNC: 123 MG/DL (ref 65–99)
GLUCOSE BLD STRIP.AUTO-MCNC: 125 MG/DL (ref 65–99)
GLUCOSE BLD STRIP.AUTO-MCNC: 140 MG/DL (ref 65–99)
GLUCOSE BLD STRIP.AUTO-MCNC: 146 MG/DL (ref 65–99)
GLUCOSE BLD STRIP.AUTO-MCNC: 150 MG/DL (ref 65–99)
GLUCOSE BLD STRIP.AUTO-MCNC: 164 MG/DL (ref 65–99)
GLUCOSE BLD STRIP.AUTO-MCNC: 374 MG/DL (ref 65–99)
GLUCOSE SERPL-MCNC: 128 MG/DL (ref 65–99)
HCT VFR BLD AUTO: 38.3 % (ref 37–47)
HGB BLD-MCNC: 11.5 G/DL (ref 12–16)
IMM GRANULOCYTES # BLD AUTO: 0.12 K/UL (ref 0–0.11)
IMM GRANULOCYTES NFR BLD AUTO: 0.9 % (ref 0–0.9)
LACTATE SERPL-SCNC: 0.8 MMOL/L (ref 0.5–2)
LACTATE SERPL-SCNC: 1 MMOL/L (ref 0.5–2)
LYMPHOCYTES # BLD AUTO: 1.11 K/UL (ref 1–4.8)
LYMPHOCYTES NFR BLD: 7.9 % (ref 22–41)
MCH RBC QN AUTO: 30.1 PG (ref 27–33)
MCHC RBC AUTO-ENTMCNC: 30 G/DL (ref 32.2–35.5)
MCV RBC AUTO: 100.3 FL (ref 81.4–97.8)
MONOCYTES # BLD AUTO: 1.2 K/UL (ref 0–0.85)
MONOCYTES NFR BLD AUTO: 8.5 % (ref 0–13.4)
NEUTROPHILS # BLD AUTO: 11.59 K/UL (ref 1.82–7.42)
NEUTROPHILS NFR BLD: 82.4 % (ref 44–72)
NRBC # BLD AUTO: 0 K/UL
NRBC BLD-RTO: 0 /100 WBC (ref 0–0.2)
PLATELET # BLD AUTO: 188 K/UL (ref 164–446)
PMV BLD AUTO: 9.9 FL (ref 9–12.9)
POTASSIUM SERPL-SCNC: 4.8 MMOL/L (ref 3.6–5.5)
PROT SERPL-MCNC: 5.9 G/DL (ref 6–8.2)
RBC # BLD AUTO: 3.82 M/UL (ref 4.2–5.4)
SODIUM SERPL-SCNC: 132 MMOL/L (ref 135–145)
TROPONIN T SERPL-MCNC: 206 NG/L (ref 6–19)
UFH PPP CHRO-ACNC: 0.45 IU/ML
UFH PPP CHRO-ACNC: <0.1 IU/ML
WBC # BLD AUTO: 14.1 K/UL (ref 4.8–10.8)

## 2025-03-22 PROCEDURE — 700101 HCHG RX REV CODE 250

## 2025-03-22 PROCEDURE — 82962 GLUCOSE BLOOD TEST: CPT

## 2025-03-22 PROCEDURE — 85347 COAGULATION TIME ACTIVATED: CPT

## 2025-03-22 PROCEDURE — B2151ZZ FLUOROSCOPY OF LEFT HEART USING LOW OSMOLAR CONTRAST: ICD-10-PCS | Performed by: INTERNAL MEDICINE

## 2025-03-22 PROCEDURE — 99223 1ST HOSP IP/OBS HIGH 75: CPT | Performed by: INTERNAL MEDICINE

## 2025-03-22 PROCEDURE — 700111 HCHG RX REV CODE 636 W/ 250 OVERRIDE (IP): Performed by: INTERNAL MEDICINE

## 2025-03-22 PROCEDURE — A9270 NON-COVERED ITEM OR SERVICE: HCPCS | Performed by: HOSPITALIST

## 2025-03-22 PROCEDURE — 80053 COMPREHEN METABOLIC PANEL: CPT

## 2025-03-22 PROCEDURE — B2111ZZ FLUOROSCOPY OF MULTIPLE CORONARY ARTERIES USING LOW OSMOLAR CONTRAST: ICD-10-PCS | Performed by: INTERNAL MEDICINE

## 2025-03-22 PROCEDURE — 93010 ELECTROCARDIOGRAM REPORT: CPT | Performed by: INTERNAL MEDICINE

## 2025-03-22 PROCEDURE — 700102 HCHG RX REV CODE 250 W/ 637 OVERRIDE(OP): Performed by: HOSPITALIST

## 2025-03-22 PROCEDURE — 99232 SBSQ HOSP IP/OBS MODERATE 35: CPT | Performed by: INTERNAL MEDICINE

## 2025-03-22 PROCEDURE — 94640 AIRWAY INHALATION TREATMENT: CPT

## 2025-03-22 PROCEDURE — 700111 HCHG RX REV CODE 636 W/ 250 OVERRIDE (IP): Mod: JZ

## 2025-03-22 PROCEDURE — 700102 HCHG RX REV CODE 250 W/ 637 OVERRIDE(OP)

## 2025-03-22 PROCEDURE — 99153 MOD SED SAME PHYS/QHP EA: CPT

## 2025-03-22 PROCEDURE — 700111 HCHG RX REV CODE 636 W/ 250 OVERRIDE (IP): Mod: JZ | Performed by: HOSPITALIST

## 2025-03-22 PROCEDURE — 99291 CRITICAL CARE FIRST HOUR: CPT | Performed by: STUDENT IN AN ORGANIZED HEALTH CARE EDUCATION/TRAINING PROGRAM

## 2025-03-22 PROCEDURE — 4A023N7 MEASUREMENT OF CARDIAC SAMPLING AND PRESSURE, LEFT HEART, PERCUTANEOUS APPROACH: ICD-10-PCS | Performed by: INTERNAL MEDICINE

## 2025-03-22 PROCEDURE — 700117 HCHG RX CONTRAST REV CODE 255: Performed by: INTERNAL MEDICINE

## 2025-03-22 PROCEDURE — 93005 ELECTROCARDIOGRAM TRACING: CPT | Mod: TC | Performed by: INTERNAL MEDICINE

## 2025-03-22 PROCEDURE — A9270 NON-COVERED ITEM OR SERVICE: HCPCS | Performed by: STUDENT IN AN ORGANIZED HEALTH CARE EDUCATION/TRAINING PROGRAM

## 2025-03-22 PROCEDURE — A9270 NON-COVERED ITEM OR SERVICE: HCPCS

## 2025-03-22 PROCEDURE — 99223 1ST HOSP IP/OBS HIGH 75: CPT | Performed by: HOSPITALIST

## 2025-03-22 PROCEDURE — 83605 ASSAY OF LACTIC ACID: CPT

## 2025-03-22 PROCEDURE — 700102 HCHG RX REV CODE 250 W/ 637 OVERRIDE(OP): Performed by: STUDENT IN AN ORGANIZED HEALTH CARE EDUCATION/TRAINING PROGRAM

## 2025-03-22 PROCEDURE — 770000 HCHG ROOM/CARE - INTERMEDIATE ICU *

## 2025-03-22 PROCEDURE — 85520 HEPARIN ASSAY: CPT

## 2025-03-22 PROCEDURE — 85025 COMPLETE CBC W/AUTO DIFF WBC: CPT

## 2025-03-22 PROCEDURE — B241ZZ3 ULTRASONOGRAPHY OF MULTIPLE CORONARY ARTERIES, INTRAVASCULAR: ICD-10-PCS | Performed by: INTERNAL MEDICINE

## 2025-03-22 PROCEDURE — 027136Z DILATION OF CORONARY ARTERY, TWO ARTERIES WITH THREE DRUG-ELUTING INTRALUMINAL DEVICES, PERCUTANEOUS APPROACH: ICD-10-PCS | Performed by: INTERNAL MEDICINE

## 2025-03-22 PROCEDURE — 84484 ASSAY OF TROPONIN QUANT: CPT

## 2025-03-22 RX ORDER — ACETAMINOPHEN 325 MG/1
650 TABLET ORAL EVERY 6 HOURS
Status: DISCONTINUED | OUTPATIENT
Start: 2025-03-22 | End: 2025-03-26 | Stop reason: HOSPADM

## 2025-03-22 RX ORDER — METOPROLOL TARTRATE 25 MG/1
12.5 TABLET, FILM COATED ORAL 2 TIMES DAILY
Status: DISCONTINUED | OUTPATIENT
Start: 2025-03-22 | End: 2025-03-23

## 2025-03-22 RX ORDER — CLOPIDOGREL 300 MG/1
TABLET, FILM COATED ORAL
Status: COMPLETED
Start: 2025-03-22 | End: 2025-03-22

## 2025-03-22 RX ORDER — HYDROMORPHONE HYDROCHLORIDE 2 MG/1
2 TABLET ORAL
Refills: 0 | Status: DISCONTINUED | OUTPATIENT
Start: 2025-03-22 | End: 2025-03-22

## 2025-03-22 RX ORDER — CLOPIDOGREL 300 MG/1
600 TABLET, FILM COATED ORAL ONCE
Status: DISCONTINUED | OUTPATIENT
Start: 2025-03-22 | End: 2025-03-22

## 2025-03-22 RX ORDER — HYDROMORPHONE HYDROCHLORIDE 2 MG/1
2 TABLET ORAL
Refills: 0 | Status: DISCONTINUED | OUTPATIENT
Start: 2025-03-22 | End: 2025-03-26 | Stop reason: HOSPADM

## 2025-03-22 RX ORDER — HEPARIN SODIUM 1000 [USP'U]/ML
INJECTION, SOLUTION INTRAVENOUS; SUBCUTANEOUS
Status: COMPLETED
Start: 2025-03-22 | End: 2025-03-22

## 2025-03-22 RX ORDER — HEPARIN SODIUM 200 [USP'U]/100ML
INJECTION, SOLUTION INTRAVENOUS
Status: COMPLETED
Start: 2025-03-22 | End: 2025-03-22

## 2025-03-22 RX ORDER — ATORVASTATIN CALCIUM 40 MG/1
40 TABLET, FILM COATED ORAL EVERY EVENING
Status: DISCONTINUED | OUTPATIENT
Start: 2025-03-22 | End: 2025-03-26 | Stop reason: HOSPADM

## 2025-03-22 RX ORDER — MIDAZOLAM HYDROCHLORIDE 1 MG/ML
INJECTION INTRAMUSCULAR; INTRAVENOUS
Status: COMPLETED
Start: 2025-03-22 | End: 2025-03-22

## 2025-03-22 RX ORDER — INSULIN LISPRO 100 [IU]/ML
2-9 INJECTION, SOLUTION INTRAVENOUS; SUBCUTANEOUS
Status: DISCONTINUED | OUTPATIENT
Start: 2025-03-22 | End: 2025-03-25

## 2025-03-22 RX ORDER — LIDOCAINE HYDROCHLORIDE 20 MG/ML
INJECTION, SOLUTION INFILTRATION; PERINEURAL
Status: COMPLETED
Start: 2025-03-22 | End: 2025-03-22

## 2025-03-22 RX ORDER — DEXTROSE MONOHYDRATE 25 G/50ML
25 INJECTION, SOLUTION INTRAVENOUS
Status: DISCONTINUED | OUTPATIENT
Start: 2025-03-22 | End: 2025-03-25

## 2025-03-22 RX ORDER — GABAPENTIN 100 MG/1
100 CAPSULE ORAL 3 TIMES DAILY
Status: DISCONTINUED | OUTPATIENT
Start: 2025-03-22 | End: 2025-03-26 | Stop reason: HOSPADM

## 2025-03-22 RX ORDER — VERAPAMIL HYDROCHLORIDE 2.5 MG/ML
INJECTION INTRAVENOUS
Status: COMPLETED
Start: 2025-03-22 | End: 2025-03-22

## 2025-03-22 RX ORDER — SODIUM CHLORIDE 9 MG/ML
3 INJECTION, SOLUTION INTRAVENOUS CONTINUOUS
Status: DISCONTINUED | OUTPATIENT
Start: 2025-03-22 | End: 2025-03-22

## 2025-03-22 RX ORDER — CLOPIDOGREL BISULFATE 75 MG/1
75 TABLET ORAL DAILY
Status: DISCONTINUED | OUTPATIENT
Start: 2025-03-23 | End: 2025-03-26 | Stop reason: HOSPADM

## 2025-03-22 RX ADMIN — HEPARIN SODIUM 2000 UNITS: 200 INJECTION, SOLUTION INTRAVENOUS at 14:47

## 2025-03-22 RX ADMIN — ASPIRIN 81 MG: 81 TABLET, CHEWABLE ORAL at 05:55

## 2025-03-22 RX ADMIN — METOPROLOL TARTRATE 12.5 MG: 25 TABLET, FILM COATED ORAL at 17:10

## 2025-03-22 RX ADMIN — MIDAZOLAM HYDROCHLORIDE 2 MG: 1 INJECTION, SOLUTION INTRAMUSCULAR; INTRAVENOUS at 15:18

## 2025-03-22 RX ADMIN — INSULIN LISPRO 8 UNITS: 100 INJECTION, SOLUTION INTRAVENOUS; SUBCUTANEOUS at 20:49

## 2025-03-22 RX ADMIN — HEPARIN SODIUM: 1000 INJECTION, SOLUTION INTRAVENOUS; SUBCUTANEOUS at 15:17

## 2025-03-22 RX ADMIN — HEPARIN SODIUM 18 UNITS/KG/HR: 5000 INJECTION, SOLUTION INTRAVENOUS at 13:01

## 2025-03-22 RX ADMIN — AMIODARONE HYDROCHLORIDE 1 MG/MIN: 1.8 INJECTION, SOLUTION INTRAVENOUS at 21:20

## 2025-03-22 RX ADMIN — LIDOCAINE 1 PATCH: 4 PATCH TOPICAL at 17:10

## 2025-03-22 RX ADMIN — ACETAMINOPHEN 650 MG: 325 TABLET ORAL at 16:47

## 2025-03-22 RX ADMIN — HYDROXYZINE HYDROCHLORIDE 10 MG: 10 TABLET ORAL at 06:37

## 2025-03-22 RX ADMIN — DAPAGLIFLOZIN 10 MG: 10 TABLET, FILM COATED ORAL at 05:56

## 2025-03-22 RX ADMIN — FENTANYL CITRATE 100 MCG: 50 INJECTION, SOLUTION INTRAMUSCULAR; INTRAVENOUS at 15:18

## 2025-03-22 RX ADMIN — AMIODARONE HYDROCHLORIDE 1 MG/MIN: 1.8 INJECTION, SOLUTION INTRAVENOUS at 17:45

## 2025-03-22 RX ADMIN — AMIODARONE HYDROCHLORIDE 150 MG: 1.5 INJECTION, SOLUTION INTRAVENOUS at 17:05

## 2025-03-22 RX ADMIN — HEPARIN SODIUM: 1000 INJECTION, SOLUTION INTRAVENOUS; SUBCUTANEOUS at 15:16

## 2025-03-22 RX ADMIN — LIDOCAINE HYDROCHLORIDE: 20 INJECTION, SOLUTION INFILTRATION; PERINEURAL at 15:15

## 2025-03-22 RX ADMIN — OXYCODONE 5 MG: 5 TABLET ORAL at 07:07

## 2025-03-22 RX ADMIN — HYDROXYZINE HYDROCHLORIDE 10 MG: 10 TABLET ORAL at 20:53

## 2025-03-22 RX ADMIN — FUROSEMIDE 80 MG: 10 INJECTION, SOLUTION INTRAVENOUS at 05:58

## 2025-03-22 RX ADMIN — VERAPAMIL HYDROCHLORIDE 2.5 MG: 2.5 INJECTION, SOLUTION INTRAVENOUS at 15:15

## 2025-03-22 RX ADMIN — SENNOSIDES AND DOCUSATE SODIUM 2 TABLET: 50; 8.6 TABLET ORAL at 17:10

## 2025-03-22 RX ADMIN — INSULIN LISPRO 2 UNITS: 100 INJECTION, SOLUTION INTRAVENOUS; SUBCUTANEOUS at 17:35

## 2025-03-22 RX ADMIN — INSULIN GLARGINE-YFGN 5 UNITS: 100 INJECTION, SOLUTION SUBCUTANEOUS at 17:34

## 2025-03-22 RX ADMIN — FLUOXETINE HYDROCHLORIDE 20 MG: 20 CAPSULE ORAL at 05:55

## 2025-03-22 RX ADMIN — ATORVASTATIN CALCIUM 40 MG: 40 TABLET, FILM COATED ORAL at 17:10

## 2025-03-22 RX ADMIN — HYDROXYZINE HYDROCHLORIDE 10 MG: 10 TABLET ORAL at 13:41

## 2025-03-22 RX ADMIN — IOHEXOL 170 ML: 350 INJECTION, SOLUTION INTRAVENOUS at 15:14

## 2025-03-22 RX ADMIN — CLOPIDOGREL BISULFATE 600 MG: 300 TABLET, FILM COATED ORAL at 15:24

## 2025-03-22 RX ADMIN — NITROGLYCERIN 10 ML: 20 INJECTION INTRAVENOUS at 15:16

## 2025-03-22 RX ADMIN — FLUTICASONE FUROATE, UMECLIDINIUM BROMIDE AND VILANTEROL TRIFENATATE 1 PUFF: 200; 62.5; 25 POWDER RESPIRATORY (INHALATION) at 08:32

## 2025-03-22 RX ADMIN — HEPARIN SODIUM 2800 UNITS: 1000 INJECTION, SOLUTION INTRAVENOUS; SUBCUTANEOUS at 23:47

## 2025-03-22 RX ADMIN — GABAPENTIN 100 MG: 100 CAPSULE ORAL at 16:47

## 2025-03-22 ASSESSMENT — PAIN DESCRIPTION - PAIN TYPE
TYPE: ACUTE PAIN

## 2025-03-22 ASSESSMENT — ENCOUNTER SYMPTOMS: SHORTNESS OF BREATH: 1

## 2025-03-22 ASSESSMENT — FIBROSIS 4 INDEX: FIB4 SCORE: 3.55

## 2025-03-22 NOTE — PROGRESS NOTES
4 Eyes Skin Assessment Completed by ALEXANDER Ray and ALEXANDER Victor.    Head WDL  Ears WDL  Nose WDL  Mouth WDL  Neck WDL  Breast/Chest WDL  Shoulder Blades WDL  Spine WDL  (R) Arm/Elbow/Hand Redness and Blanching  (L) Arm/Elbow/Hand Redness and Blanching  Abdomen WDL  Groin Redness and Blanching  Scrotum/Coccyx/Buttocks WDL  (R) Leg WDL  (L) Leg Redness, Scar, Bruising, and Swelling  (R) Heel/Foot/Toe Redness, Blanching, and Boggy, Discoloration  (L) Heel/Foot/Toe Redness, Blanching, and Boggy, Discoloration          Devices In Places ECG, Blood Pressure Cuff, Pulse Ox, and Nasal Cannula      Interventions In Place Gray Ear Foams, NC W/Ear Foams, Heel Mepilex, TAP System, Pillows, Q2 Turns, and Heels Loaded W/Pillows    Possible Skin Injury No    Pictures Uploaded Into Epic N/A  Wound Consult Placed N/A  RN Wound Prevention Protocol Ordered No

## 2025-03-22 NOTE — PROGRESS NOTES
"Critical Care Progress Note    Date of admission  3/21/2025    Chief Complaint  79 y.o. female admitted 3/21/2025 with AHDF, AHRF and new cardiomyopathy    Hospital Course  \"This is an 80 yo F w/ HFpEF, PH, COPD w/ chronic hypoxic/hypercapneic RF on 2L NC, IDDM, and HTN who presented to [facility] on 3/20 after a mechanical fall and developed sudden onset shortness of breath. Xrays of her ankles/knees were negative for acute fracture. 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 CTA chest negative for PE and showed pulmonary edema with small R pleural effusion on background of emphysema. Labs showed mild troponin elevation, hyperglycemia, normal procal, negative flu/covid and elevated BNP. ECG shows no overt ischemic changes. Patient herself notes 10 lbs weight gain and leg swelling over the last week. She denies cough, fever, chills, sputum production. She received empiric treatment for PNA/AECOPD w/ vanc/zosyn/solumedrol. On arrival to our ICU she was able to be weaned from BiPAP to 4L NC. She arrived on low-dose levophed, which has been nearly weaned off. She was described as full code on transfer, but she has stated her wish is to be DNR/DNI. We will continue diuresis, trend troponins, consult cardiology and start insulin gtt for hyperglycemia. Will hold on further abx/steroids for now.\" Dr Oconnor       Interval Problem Update  Reviewed last 24 hour events:              - Acute Overnight events:Started on heparin gtt for possible NSTEMI, NPO for possible cath, weaned off insulin and levophed    - Tm: AFeb   - Hr: 80-90   - SBP: 114-95   - I/Os: -1.1L    - Vasoactive Drips: Levophed   - RASS: -1 Sedation: None Analgesia: PRN tylenol and oxy   - Vent settings and day: on 6L NC              - Lines/Tubes: pIVs              - DVT Ppx: Heparin gtt   - Nutrition: NPO, Bowel Reg: PRN, GI PPX: N/A   - " Glucose control: Lantus, SSI   - Abx: None   - Mobility: Up to chair   - Plan for Today: Transfer to [facility]       Review of Systems  Review of Systems   Cardiovascular:  Positive for chest pain.        Vital Signs for last 24 hours   Temp:  [36.7 °C (98.1 °F)-37.3 °C (99.2 °F)] 37.1 °C (98.8 °F)  Pulse:  [76-97] 81  Resp:  [16-36] 24  BP: ()/(42-70) 114/54  SpO2:  [88 %-99 %] 98 %    Hemodynamic parameters for last 24 hours       Respiratory Information for the last 24 hours       Physical Exam   Physical Exam  Constitutional:       Comments: Elderly well-appearing female sitting upright in NAD   Eyes:      General: No scleral icterus.     Extraocular Movements: Extraocular movements intact.   Cardiovascular:      Rate and Rhythm: Normal rate and regular rhythm.   Pulmonary:      Effort: Pulmonary effort is normal.      Breath sounds: Normal breath sounds.   Abdominal:      General: Abdomen is flat.      Palpations: Abdomen is soft.   Musculoskeletal:      Right lower leg: Edema present.      Left lower leg: Edema present.      Comments: Tenderness to palpation over upper chest    Skin:     General: Skin is warm and dry.   Neurological:      General: No focal deficit present.      Mental Status: She is oriented to person, place, and time.         Medications  Current Facility-Administered Medications   Medication Dose Route Frequency Provider Last Rate Last Admin    Respiratory Therapy Consult   Nebulization Continuous RT Elian Oconnor M.D.        acetaminophen (Tylenol) tablet 650 mg  650 mg Oral Q6HRS PRN Elian Oconnor M.D.   650 mg at 03/21/25 1419    senna-docusate (Pericolace Or Senokot S) 8.6-50 MG per tablet 2 Tablet  2 Tablet Oral Q EVENING Elian Oconnor M.D.   2 Tablet at 03/21/25 1745    And    polyethylene glycol/lytes (Miralax) Packet 1 Packet  1 Packet Oral QDAY PRN Elian Oconnor M.D.        norepinephrine (Levophed) 8 mg in 250 mL NS infusion (premix)  0-1 mcg/kg/min (Ideal)  Intravenous Continuous Elian Oconnor M.D. 4.4 mL/hr at 03/21/25 2320 0.04 mcg/kg/min at 03/21/25 2320    aspirin (Asa) chewable tab 81 mg  81 mg Oral DAILY Naveen Stewart M.D.   81 mg at 03/22/25 0555    dapagliflozin propanediol (Farxiga) tablet 10 mg  10 mg Oral DAILY Naveen Stewart M.D.   10 mg at 03/22/25 0556    FLUoxetine (PROzac) capsule 20 mg  20 mg Oral DAILY Naveen Stewart M.D.   20 mg at 03/22/25 0555    simvastatin (Zocor) tablet 20 mg  20 mg Oral Q EVENING Naveen Stewart M.D.   20 mg at 03/21/25 1745    fluticasone-umeclidinium-vilanterol (Trelegy Ellipta) 200-62.5-25 mcg/act inhaler 1 Puff  1 Puff Inhalation QDAILY (RT) Naveen Stewart M.D.        ipratropium-albuterol (DUONEB) nebulizer solution  3 mL Nebulization Q4H PRN (RT) Naveen Stewart M.D.        furosemide (Lasix) injection 80 mg  80 mg Intravenous BID DIURETIC Naveen Stewart M.D.   80 mg at 03/22/25 0558    lidocaine (Asperflex) 4 % patch 1 Patch  1 Patch Transdermal Q24HR Naveen Stewart M.D.   1 Patch at 03/21/25 1747    hydrOXYzine HCl (Atarax) tablet 10 mg  10 mg Oral Q6HRS PRN Naveen Stewart M.D.   10 mg at 03/22/25 0637    oxyCODONE immediate-release (Roxicodone) tablet 5 mg  5 mg Oral Q6HRS PRN Naveen Stewart M.D.   5 mg at 03/21/25 1842    heparin infusion 25,000 units in 500 mL 0.45% NACL  0-30 Units/kg/hr Intravenous Continuous Matilda House M.D. 21 mL/hr at 03/22/25 0015 15 Units/kg/hr at 03/22/25 0015    heparin injection 2,800 Units  40 Units/kg Intravenous PRN Matilda House M.D.        insulin lispro (HumaLOG,AdmeLOG) subcutaneous injection  1-6 Units Subcutaneous Q6HRS Brynn Schrader V, D.O.        And    dextrose 50 % (D50W) injection 25 g  25 g Intravenous Q15 MIN PRN Brynn Schrader V, D.O.        insulin GLARGINE (Lantus,Semglee) injection  5 Units Subcutaneous Q EVENING Brynn Schrader V, D.O.   5 Units at 03/21/25 2353       Fluids    Intake/Output Summary (Last 24 hours) at  3/22/2025 0646  Last data filed at 3/22/2025 0600  Gross per 24 hour   Intake 591.56 ml   Output 1750 ml   Net -1158.44 ml       Laboratory          Recent Labs     03/21/25  1345 03/21/25  1740 03/22/25  0310   SODIUM 132* 134* 132*   POTASSIUM 5.1 5.3 4.8   CHLORIDE 95* 95* 97   CO2 17* 22 24   BUN 33* 32* 34*   CREATININE 0.79 0.79 0.68   MAGNESIUM 2.0  --   --    CALCIUM 9.5 10.0 9.2     Recent Labs     03/21/25  1345 03/21/25  1740 03/22/25  0310   ALTSGPT 21  --  16   ASTSGOT 35  --  33   ALKPHOSPHAT 56  --  48   TBILIRUBIN 0.4  --  0.4   GLUCOSE 410* 281* 128*     Recent Labs     03/21/25  1345 03/22/25  0310   WBC 16.8* 14.1*   NEUTSPOLYS 86.30* 82.40*   LYMPHOCYTES 4.60* 7.90*   MONOCYTES 7.60 8.50   EOSINOPHILS 0.60 0.00   BASOPHILS 0.20 0.30   ASTSGOT 35 33   ALTSGPT 21 16   ALKPHOSPHAT 56 48   TBILIRUBIN 0.4 0.4     Recent Labs     03/21/25  1345 03/21/25  1740 03/22/25  0310   RBC 4.24  --  3.82*   HEMOGLOBIN 13.1  --  11.5*   HEMATOCRIT 42.5  --  38.3   PLATELETCT 260  --  188   PROTHROMBTM  --  13.0  --    APTT  --  26.3  --    INR  --  0.98  --        Imaging  X-Ray:  I have personally reviewed the images and compared with prior images.  CT:    Reviewed    Assessment/Plan  * Acute HFrEF (heart failure with reduced ejection fraction) (HCC)- (present on admission)  Assessment & Plan  OSH echo shows hypokinesis of apex and mid LV segements with preserved bases  Most consistent with stress cardiomyopathy vs less likely NSTEMI    Lasix 80 IV BID  Will consult cards, trend trops, get formal echo  Heparin gtt for possible NSTEM  NPO for possible cath    Acute on chronic respiratory failure with hypoxia (HCC)- (present on admission)  Assessment & Plan  Patient on 2L NC at home  Weaned to 4L NC on arrival to ICU   Patient reports bilateral LE edema, 10 lbs weight gain, denies cough, wheezing, fevers, chills to suggest infection or AECOPD  CTA chest from OSH shows diffuse interlobular septal thickening, small  R pleural effusion, no overt consolidations to suggest PNA  Procal at OSH very low    Plan  Diuresis w/ lasix 80 IV BID  Procal, MRSA nares, viral panel, cultures  Hold on abx or steroids  Nebs PRN     Shock (HCC)  Assessment & Plan  Mild, likely cardiogenic in setting of newly reduced LVEF  Levophed titrated to goal MAPs >65  Trend UOP, lactate, other indices of perfusion   Levophed off as of AM of 3/22    Hyponatremia  Assessment & Plan  Mild, likely related to ADHF  Cont to monitor    Intercostal pain  Assessment & Plan  Patient reports pain over upper chest bilaterally  Reproducible with palpation  She thinks its MSK pain related to muscle strain after fall and her son helping her up  Multimodal pain control - tylenol, oxy, gabapentin, lidocaine patch      Chronic obstructive lung disease (HCC)  Assessment & Plan  Takes nebs PRN only at home she says  No signs or symptoms of AECOPD  Titrate O2 to goal sat 88-92%  Prn nebs and trelegy while inpatient     Accident due to mechanical fall without injury  Assessment & Plan  Xrays of bilateral knees and ankles negative at OSH  Patient denies loss of consciousness, head trauma with fall  Says her knees just gave out on her  PT/OT   Pain control with oxy, tylenol, gabapentin     Type 2 diabetes mellitus with ophthalmic complication (HCC)- (present on admission)  Assessment & Plan  Patient on insulin and orals at home  Cont home farxiga  Moderate SSI and lantus     Dyslipidemia- (present on admission)  Assessment & Plan  Takes simvastatin 20 at home - cont this     Essential hypertension- (present on admission)  Assessment & Plan  Hold home benzapril and metoprolol for now in setting of shock and newly reduced EF         VTE:  Heparin  Ulcer: Not Indicated  Lines: None    I have performed a physical exam and reviewed and updated ROS and Plan today (3/22/2025). In review of yesterday's note (3/21/2025), there are no changes except as documented above.     Discussed  patient condition and risk of morbidity and/or mortality with Hospitalist, RN, RT, Pharmacy, Charge nurse / hot rounds, Patient, and cardiology  The patient remains critically ill.  Critical care time = 35 minutes in directly providing and coordinating critical care and extensive data review.  No time overlap and excludes procedures.

## 2025-03-22 NOTE — ASSESSMENT & PLAN NOTE
Patient reports pain over upper chest bilaterally  Reproducible with palpation  She thinks its MSK pain related to muscle strain after fall and her son helping her up  Multimodal pain control - tylenol, oxy, gabapentin, lidocaine patch

## 2025-03-22 NOTE — ASSESSMENT & PLAN NOTE
Xrays of bilateral knees and ankles negative at OSH  Patient denies loss of consciousness, head trauma with fall  Says her knees just gave out on her  PT/OT   Pain control with oxy, tylenol, gabapentin

## 2025-03-22 NOTE — PROGRESS NOTES
1300  Received bedside report from Luis Felipe from [facility]. Assumed care at 1300  Patient a & o x 4.  Pt c/o of chest pain contant. Worsens with breathing and movement. Pt not short of breath. Pt on 4L NC. Pt on 2L at home at baseline.   Edema to RLE (trace); edema to LLE 1+  Pt on tele monitoring   Voiding w/o difficulty with purewick  BM on PTA. Abd sof, Bowel sounds active, pt denies nausea  Pt NPO for cath lab.      Patient oriented to room, surroundings and call bell. Bed alarm on. Bed in lowest position, locked and upper side rails up. Patient  demonstrated correct use of call bell. Call bell/belongings within reach. Patient educated on hourly rounding.   Plan for today:  Cath lab

## 2025-03-22 NOTE — ASSESSMENT & PLAN NOTE
3/25/2025  With stenting x 3 on 3/22/2025  Eliquis and plavix for antiplatelet therapy  Start very low-dose beta-blocker  Continuous telemetry monitoring  Increase Zocor to high intensity statin Lipitor.  LDL 58

## 2025-03-22 NOTE — ASSESSMENT & PLAN NOTE
3/25/2025  Continue fluid restriction, diuresis  Hypervolemic  Follow up BMP in the morning after diuretics

## 2025-03-22 NOTE — ASSESSMENT & PLAN NOTE
3/25/2025  New onset post heart cath  Rate 120's  IV heparin drip will transition to Eliquis  IV amiodarone bolus and drip transition to oral  Low dose bisoprolol for rate control (she was recently in shock thus low dose).

## 2025-03-22 NOTE — ASSESSMENT & PLAN NOTE
Takes nebs PRN only at home she says  No signs or symptoms of AECOPD  Titrate O2 to goal sat 88-92%  Prn nebs and trelegy while inpatient

## 2025-03-22 NOTE — ASSESSMENT & PLAN NOTE
3/25/2025  With hyperglycemia  Add sliding scale  HbA1c quite elevated at 12  Restart lantus 10 units daily and consider increasing the dose accordingly  Holding outpatient metformin, pioglitazone, dapagliflozin  Order diabetic education.  Patient states she would be open to insulin sliding scale if needed.

## 2025-03-22 NOTE — ASSESSMENT & PLAN NOTE
3/25/2025  EF 30% by echo and heart cath  Ischemic  S/p stents x 3  Goal directed medical therapy once her blood pressure improves

## 2025-03-22 NOTE — CONSULTS
Interventional cardiology consultation requested by  for cardiac catheterization and possible percutaneous coronary intervention    CC: Abnormal echocardiogram, shortness of breath, non-ST elevation MI    HPI:  79 y.o. year old patient with shortness of breath, respiratory distress, she had a fall recently, recovering, developed respiratory distress, echocardiogram showed reduced LV function, wall motion abnormality.  Troponin is elevated.    Medications / Drug list prior to admission:  No current facility-administered medications on file prior to encounter.     Current Outpatient Medications on File Prior to Encounter   Medication Sig Dispense Refill    LANTUS SOLOSTAR 100 UNIT/ML Solution Pen-injector injection Inject 10 Units under the skin every morning.      Melatonin 10 MG Tab Take 10 mg by mouth at bedtime as needed.      ascorbic acid (VITAMIN C) 500 MG tablet Take 500 mg by mouth every day.      furosemide (LASIX) 20 MG Tab Take 1 Tablet by mouth every day. Hold for sbp less 105 30 Tablet 0    potassium chloride SA (K-DUR) 10 MEQ Tab CR Take 1 Tablet by mouth every day. 30 Tablet 0    metFORMIN (GLUCOPHAGE) 850 MG Tab Take 850 mg by mouth 3 times a day with meals.      benazepril (LOTENSIN) 20 MG Tab Take 20 mg by mouth every day.      CALCIUM PO Take 1 Tablet by mouth every day.      Coenzyme Q10 (CO Q 10 PO) Take 1 Tablet by mouth every day.      aspirin (ASA) 81 MG Chew Tab chewable tablet Chew 1 tablet every day. 100 tablet 0    pioglitazone (ACTOS) 45 MG Tab Take 45 mg by mouth every morning.      simvastatin (ZOCOR) 20 MG Tab Take 1 Tab by mouth every evening. 30 Tab 8    metoprolol (LOPRESSOR) 25 MG Tab Take 1 Tab by mouth 2 times a day. 60 Tab 8    FLUoxetine (PROZAC) 20 MG Cap Take 1 Cap by mouth every day. 30 Cap 8    acetaminophen (TYLENOL) 500 MG Tab Take 500-1,000 mg by mouth every 6 hours as needed for Mild Pain.      dapagliflozin propanediol (FARXIGA) 5 MG Tab Take 5 mg by mouth every  day.      nitroglycerin (NITROSTAT) 0.4 MG SL Tab Place 1 Tab under tongue as needed for Chest Pain. 25 Tab 1    NON SPECIFIED Portable Oxygen  Dx: COPD 1 Each 11       Current list of administered Medications:    Current Facility-Administered Medications:     FENTANYL CITRATE (PF) 0.05 MG/ML INJ SOLN (WRAPPED), , , ,     MIDAZOLAM HCL 1 MG/ML INJ SOLN (WRAPPER), , , ,     gabapentin (Neurontin) capsule 100 mg, 100 mg, Oral, TID, Elian Oconnor M.D.    acetaminophen (Tylenol) tablet 650 mg, 650 mg, Oral, Q6HRS, Elian Oconnor M.D.    VERAPAMIL HCL 2.5 MG/ML IV SOLN, , , ,     HEPARIN SODIUM (PORCINE) 1000 UNIT/ML INJ SOLN, , , ,     LIDOCAINE HCL 2 % INJ SOLN, , , ,     HEPARIN (PORCINE) IN NACL 2000-0.9 UNIT/L-% IV SOLN, , , ,     iohexol (OMNIPAQUE) 350 mg/mL, 1-300 mL, Intra-arterial, Once, Pacheco Franklin M.D.    NITROGLYCERIN 2 MG IV SOLN, , , ,     Respiratory Therapy Consult, , Nebulization, Continuous RT, Elian Oconnor M.D.    senna-docusate (Pericolace Or Senokot S) 8.6-50 MG per tablet 2 Tablet, 2 Tablet, Oral, Q EVENING, 2 Tablet at 03/21/25 1745 **AND** polyethylene glycol/lytes (Miralax) Packet 1 Packet, 1 Packet, Oral, QDAY PRN, Elian Oconnor M.D.    aspirin (Asa) chewable tab 81 mg, 81 mg, Oral, DAILY, Naveen Stewart M.D., 81 mg at 03/22/25 0555    dapagliflozin propanediol (Farxiga) tablet 10 mg, 10 mg, Oral, DAILY, Naveen Stewart M.D., 10 mg at 03/22/25 0556    FLUoxetine (PROzac) capsule 20 mg, 20 mg, Oral, DAILY, Naveen Stewart M.D., 20 mg at 03/22/25 0555    simvastatin (Zocor) tablet 20 mg, 20 mg, Oral, Q EVENING, Naveen Stewart M.D., 20 mg at 03/21/25 1745    fluticasone-umeclidinium-vilanterol (Trelegy Ellipta) 200-62.5-25 mcg/act inhaler 1 Puff, 1 Puff, Inhalation, QDAILY (RT), Naveen Stewart M.D., 1 Puff at 03/22/25 0832    ipratropium-albuterol (DUONEB) nebulizer solution, 3 mL, Nebulization, Q4H PRN (RT), Naveen Stewart M.D.     furosemide (Lasix) injection 80 mg, 80 mg, Intravenous, BID DIURETIC, Naveen Stewart M.D., 80 mg at 03/22/25 0558    lidocaine (Asperflex) 4 % patch 1 Patch, 1 Patch, Transdermal, Q24HR, Naveen Stewart M.D., 1 Patch at 03/21/25 1747    hydrOXYzine HCl (Atarax) tablet 10 mg, 10 mg, Oral, Q6HRS PRN, Naveen Stewart M.D., 10 mg at 03/22/25 1341    oxyCODONE immediate-release (Roxicodone) tablet 5 mg, 5 mg, Oral, Q6HRS PRN, Naveen Stewart M.D., 5 mg at 03/22/25 0707    heparin infusion 25,000 units in 500 mL 0.45% NACL, 0-30 Units/kg/hr, Intravenous, Continuous, Matilda House M.D., Last Rate: 25.2 mL/hr at 03/22/25 1301, 18 Units/kg/hr at 03/22/25 1301    heparin injection 2,800 Units, 40 Units/kg, Intravenous, PRN, Matilda House M.D.    insulin lispro (HumaLOG,AdmeLOG) subcutaneous injection, 1-6 Units, Subcutaneous, Q6HRS **AND** POC blood glucose manual result, , , Q6H **AND** NOTIFY MD and PharmD, , , Once **AND** Administer 20 grams of glucose (approximately 8 ounces of fruit juice) every 15 minutes PRN FSBG less than 70 mg/dL, , , PRN **AND** dextrose 50 % (D50W) injection 25 g, 25 g, Intravenous, Q15 MIN PRN, Brynn Schrader V, D.O.    insulin GLARGINE (Lantus,Semglee) injection, 5 Units, Subcutaneous, Q EVENING, Brynnyamile Schrader V, D.O., 5 Units at 03/21/25 0590    Past Medical History:   Diagnosis Date    Abnormal mammogram 3/22/2018    Arthritis/arthropathy of multiple joints 12/12/2018    Back pain     BRBPR (bright red blood per rectum) 6/1/2017    Bronchitis     Cardiac arrhythmia     Cellulitis and abscess of hand 12/12/2018    Chickenpox     Controlled type 2 diabetes mellitus without complication (HCC) 4/27/2017    Coronary artery disease involving native coronary artery of native heart without angina pectoris 4/27/2017    Cough     Diarrhea     Dyslipidemia 4/27/2017    Essential hypertension 4/27/2017    Exposure 9/20/2017    [place] measles     GI bleed     H/O colonoscopy with polypectomy  6/1/2017    History of recent dental procedure 10/16/2019    Hypoxemia 4/27/2017    Lipoma of torso 7/12/2018    Low hemoglobin 10/16/2019    Mumps     Obesity     PAD (peripheral artery disease) ([facility]) 4/27/2017    Painful joint     Post herpetic neuralgia 8/22/2019    Reactive depression 4/27/2017    Rhinitis     Shingles outbreak 06/2019    Sore muscles     Swelling of lower extremity     Tobacco abuse 4/27/2017    Tonsillitis     Type 2 diabetes mellitus with ophthalmic complication ([facility]) 3/22/2018    Valvular heart disease     Wears glasses     Weight loss     Wheezing        Past Surgical History:   Procedure Laterality Date    AR COLONOSCOPY,DIAGNOSTIC N/A 7/17/2021    Procedure: COLONOSCOPY;  Surgeon: Oh Ochoa M.D.;  Location: [facility];  Service: Gastroenterology    AR COLONOSCPY,FLEX,W/DIR SUBMUC INJECT N/A 7/17/2021    Procedure: COLONOSCOPY, WITH SCLEROTHERAPY;  Surgeon: hO Ochoa M.D.;  Location: [facility];  Service: Gastroenterology    AR COLONOSCOPY,FLEX,W/CONTROL, BLEEDING N/A 7/17/2021    Procedure: COLONOSCOPY, WITH CLIP APPLICATION;  Surgeon: Oh Ochoa M.D.;  Location: [facility];  Service: Gastroenterology    COLONOSCOPY WITH APC N/A 7/17/2021    Procedure: COLONOSCOPY, WITH ARGON PLASMA COAGULATION;  Surgeon: Oh Ochoa M.D.;  Location: [facility];  Service: Gastroenterology    AR UPPER GI ENDOSCOPY,DIAGNOSIS N/A 7/16/2021    Procedure: GASTROSCOPY;  Surgeon: Vickey Mane M.D.;  Location: SURGERY SAME DAY [facility];  Service: Gastroenterology    AR UPPER GI ENDOSCOPY,BIOPSY N/A 7/16/2021    Procedure: GASTROSCOPY, WITH BIOPSY;  Surgeon: Vickey Mane M.D.;  Location: SURGERY SAME DAY [facility];  Service: Gastroenterology    STENT PLACEMENT  2007    cardiac stent    HYSTERECTOMY LAPAROSCOPY  1988    TUBAL LIGATION  1983    APPENDECTOMY      TONSILLECTOMY      VEIN STRIPPING         Family History   Problem Relation Age of  Onset    Cancer Mother         rectal    Arthritis Father     Alcohol/Drug Father     Heart Disease Father     Diabetes Brother     Cancer Maternal Aunt         colon     Patient family history was personally reviewed, no pertinent family history to current presentation    Social History     Tobacco Use    Smoking status: Former     Current packs/day: 0.25     Average packs/day: 0.3 packs/day for 60.0 years (15.0 ttl pk-yrs)     Types: Cigarettes    Smokeless tobacco: Never   Vaping Use    Vaping status: Never Used   Substance Use Topics    Alcohol use: No    Drug use: No       ALLERGIES:  Allergies   Allergen Reactions    Aldactone [Kdc:Yellow Dye+Spironolactone] Palpitations    Percocet [Apap-Fd&C Red #40 [place]-Oxycodone] Rash     Full body  Patient states she can take if she also takes Benadryl  Tolerated acetaminophen 2020       Review of systems:  A complete review of symptoms was reviewed with patient. This is reviewed in H&P and PMH. ALL OTHERS reviewed and negative    Physical exam:  Patient Vitals for the past 24 hrs:   BP Temp Temp src Pulse Resp SpO2 Weight   03/22/25 1300 122/61 -- -- 76 (!) 22 98 % --   03/22/25 1200 107/55 36.7 °C (98 °F) Temporal 75 20 97 % --   03/22/25 1100 116/55 -- -- 75 19 99 % --   03/22/25 1000 109/55 -- -- 76 19 99 % --   03/22/25 0900 111/59 -- -- 78 (!) 23 98 % 70 kg (154 lb 5.2 oz)   03/22/25 0800 103/51 36.7 °C (98 °F) Temporal 82 20 95 % --   03/22/25 0707 -- -- -- -- (!) 24 -- --   03/22/25 0700 121/58 -- -- 80 (!) 21 98 % --   03/22/25 0630 -- -- -- 81 (!) 24 98 % --   03/22/25 0615 -- -- -- 79 (!) 25 97 % --   03/22/25 0600 114/54 37.1 °C (98.8 °F) Temporal 80 (!) 36 97 % --   03/22/25 0545 98/51 -- -- 80 (!) 24 98 % --   03/22/25 0530 101/53 -- -- 80 (!) 24 97 % --   03/22/25 0515 103/50 -- -- 82 (!) 23 98 % --   03/22/25 0500 110/56 -- -- 85 (!) 25 98 % --   03/22/25 0430 98/50 -- -- 83 (!) 22 98 % --   03/22/25 0415 107/51 -- -- 82 (!) 25 97 % --   03/22/25 0400  105/53 37.1 °C (98.7 °F) Temporal 82 (!) 23 98 % --   03/22/25 0345 117/58 -- -- 81 (!) 23 94 % --   03/22/25 0330 100/50 -- -- 84 (!) 22 93 % --   03/22/25 0315 106/54 -- -- 84 (!) 22 98 % --   03/22/25 0300 105/53 -- -- 86 (!) 24 98 % --   03/22/25 0215 95/49 -- -- 85 (!) 24 95 % --   03/22/25 0200 93/44 37.3 °C (99.1 °F) Temporal 84 (!) 24 97 % --   03/22/25 0145 110/53 -- -- 86 (!) 23 96 % --   03/22/25 0130 113/58 -- -- 88 (!) 24 88 % --   03/22/25 0115 120/56 -- -- 86 (!) 23 98 % --   03/22/25 0100 120/57 -- -- 87 (!) 25 98 % --   03/22/25 0045 112/56 -- -- 88 (!) 25 97 % --   03/22/25 0030 97/50 -- -- 89 (!) 24 97 % --   03/22/25 0015 110/56 -- -- 87 (!) 25 96 % --   03/22/25 0000 91/46 37.3 °C (99.2 °F) Temporal 87 16 99 % --   03/21/25 2345 95/47 -- -- 90 (!) 22 96 % --   03/21/25 2330 115/56 -- -- 86 (!) 24 98 % --   03/21/25 2245 119/56 -- -- 89 (!) 26 98 % --   03/21/25 2230 122/55 -- -- 88 (!) 23 97 % --   03/21/25 2215 115/56 -- -- 87 (!) 24 98 % --   03/21/25 2200 118/56 37.3 °C (99.2 °F) Temporal 87 (!) 23 96 % --   03/21/25 2145 124/56 -- -- 88 (!) 23 97 % --   03/21/25 2130 109/52 -- -- 90 (!) 23 98 % --   03/21/25 2115 104/54 -- -- 86 (!) 22 98 % --   03/21/25 2100 113/53 -- -- 88 (!) 24 97 % --   03/21/25 2045 109/56 -- -- 86 (!) 24 97 % --   03/21/25 2030 106/52 -- -- 85 (!) 22 97 % --   03/21/25 2015 104/51 -- -- 86 (!) 24 95 % --   03/21/25 2000 109/51 37.1 °C (98.7 °F) Temporal 88 (!) 24 96 % --   03/21/25 1945 121/59 -- -- 91 (!) 24 97 % --   03/21/25 1930 (!) 79/50 -- -- 93 (!) 24 95 % --   03/21/25 1915 94/52 -- -- 87 (!) 26 97 % --   03/21/25 1900 90/52 -- -- 91 17 97 % --   03/21/25 1845 (!) 88/50 -- -- 84 (!) 29 97 % --   03/21/25 1842 -- -- -- -- (!) 22 -- --   03/21/25 1830 (!) 80/49 -- -- 84 (!) 26 96 % --   03/21/25 1815 (!) 78/46 -- -- 85 (!) 25 97 % --   03/21/25 1800 (!) 79/42 36.7 °C (98.1 °F) Temporal 86 (!) 25 96 % --   03/21/25 1745 (!) 87/55 -- -- 85 (!) 24 95 % --    25 1730 (!) 89/50 -- -- 86 (!) 26 96 % --   25 1715 (!) 89/56 -- -- 89 (!) 25 97 % --   25 1700 93/51 -- -- 90 (!) 24 95 % --   25 1645 94/52 -- -- 83 (!) 25 95 % --   25 1630 92/54 -- -- 84 18 95 % --   25 1615 (!) 89/56 -- -- 79 (!) 21 95 % --   25 1600 104/56 -- -- 85 (!) 25 94 % --   25 1545 98/56 -- -- 86 (!) 23 95 % --   25 1530 116/66 -- -- 85 (!) 24 94 % --   25 1515 110/66 -- -- 97 (!) 31 94 % --   25 1500 101/63 -- -- 92 19 93 % --   25 1445 104/63 -- -- 85 (!) 25 92 % --   25 1430 116/55 -- -- 86 (!) 25 94 % --     General: No acute distress.   EYES: no jaundice  HEENT: OP clear   Neck:  No JVD.   CVS:  regular   Resp: Normal respiratory effort, CTAB. No wheezing or crackles/rhonchi.  Abdomen: Soft, ND,  Skin: Grossly nothing acute no obvious rashes  Neurological: Alert, Moves all extremities  Extremities:   [  no ] edema. No cyanosis.       Data:  Laboratory studies personally reviewed by me:  Recent Results (from the past 24 hours)   EKG    Collection Time: 25  2:59 PM   Result Value Ref Range    Report       [facility]    Test Date:  2025  Pt Name:    STANLEY CAMARENA               Department: 161  MRN:        [id number]                      Room:       18  Gender:     Female                       Technician: GENEVA  :        [id number]                   Requested By:OSMEL JAQUEZ  Order #:    [id number]                    Reading MD: Ean Adames MD    Measurements  Intervals                                Axis  Rate:       89                           P:          73  FL:         191                          QRS:        133  QRSD:       103                          T:          204  QT:         424  QTc:        516    Interpretive Statements  Sinus rhythm  Left posterior fascicular block  Abnormal T waves in precordial leads suggestive ischemia  Prolonged QT interval  Electronically Signed On 2025  14:59:48 PDT by Ean Adames MD     MRSA By PCR (Amp)    Collection Time: 03/21/25  3:19 PM    Specimen: Nares; Respirate   Result Value Ref Range    MRSA by PCR Negative Negative   LACTIC ACID    Collection Time: 03/21/25  3:19 PM   Result Value Ref Range    Lactic Acid 1.7 0.5 - 2.0 mmol/L   Respiratory Panel by PCR (Inpatient ONLY)    Collection Time: 03/21/25  3:22 PM    Specimen: Nasopharyngeal; Respirate   Result Value Ref Range    Adenovirus, PCR Not Detected     SARS-CoV-2 (COVID-19) RNA by ADRIANA NotDetected     Coronavirus 229E, PCR Not Detected     Coronavirus HKU1, PCR Not Detected     Coronavirus NL63, PCR Not Detected     Coronavirus OC43, PCR Not Detected     Human Metapneumovirus, PCR Not Detected     Rhino/Enterovirus, PCR Not Detected     Influenza A, PCR Not Detected     Influenza B, PCR Not Detected     Parainfluenza 1, PCR Not Detected     Parainfluenza 2, PCR Not Detected     Parainfluenza 3, PCR Not Detected     Parainfluenza 4, PCR Not Detected     RSV (Respiratory Syncytial Virus), PCR Not Detected     Bordetella parapertussis ([id number]), PCR Not Detected     Bordetella pertussis (ptxP), PCR Not Detected     Mycoplasma pneumoniae, PCR Not Detected     Chlamydia pneumoniae, PCR Not Detected    POCT glucose device results    Collection Time: 03/21/25  3:41 PM   Result Value Ref Range    POC Glucose, Blood 328 (H) 65 - 99 mg/dL   POCT glucose device results    Collection Time: 03/21/25  4:38 PM   Result Value Ref Range    POC Glucose, Blood 287 (H) 65 - 99 mg/dL   TROPONIN    Collection Time: 03/21/25  5:40 PM   Result Value Ref Range    Troponin T 231 (H) 6 - 19 ng/L   BETA-HYDROXYBUTYRIC ACID    Collection Time: 03/21/25  5:40 PM   Result Value Ref Range    beta-Hydroxybutyric Acid 1.73 (H) 0.02 - 0.27 mmol/L   Basic Metabolic Panel    Collection Time: 03/21/25  5:40 PM   Result Value Ref Range    Sodium 134 (L) 135 - 145 mmol/L    Potassium 5.3 3.6 - 5.5 mmol/L    Chloride 95 (L) 96 - 112 mmol/L     Co2 22 20 - 33 mmol/L    Glucose 281 (H) 65 - 99 mg/dL    Bun 32 (H) 8 - 22 mg/dL    Creatinine 0.79 0.50 - 1.40 mg/dL    Calcium 10.0 8.5 - 10.5 mg/dL    Anion Gap 17.0 (H) 7.0 - 16.0   aPTT    Collection Time: 03/21/25  5:40 PM   Result Value Ref Range    APTT 26.3 24.7 - 36.0 sec   Prothrombin Time    Collection Time: 03/21/25  5:40 PM   Result Value Ref Range    PT 13.0 12.0 - 14.6 sec    INR 0.98 0.87 - 1.13   Heparin Xa (Unfractionated)    Collection Time: 03/21/25  5:40 PM   Result Value Ref Range    Heparin Xa (UFH) <0.10 IU/mL   ESTIMATED GFR    Collection Time: 03/21/25  5:40 PM   Result Value Ref Range    GFR (CKD-EPI) 75 >60 mL/min/1.73 m 2   POCT glucose device results    Collection Time: 03/21/25  5:43 PM   Result Value Ref Range    POC Glucose, Blood 255 (H) 65 - 99 mg/dL   POCT glucose device results    Collection Time: 03/21/25  6:45 PM   Result Value Ref Range    POC Glucose, Blood 201 (H) 65 - 99 mg/dL   POCT glucose device results    Collection Time: 03/21/25  7:52 PM   Result Value Ref Range    POC Glucose, Blood 140 (H) 65 - 99 mg/dL   POCT glucose device results    Collection Time: 03/21/25  8:19 PM   Result Value Ref Range    POC Glucose, Blood 150 (H) 65 - 99 mg/dL   POCT glucose device results    Collection Time: 03/21/25  9:38 PM   Result Value Ref Range    POC Glucose, Blood 122 (H) 65 - 99 mg/dL   POCT glucose device results    Collection Time: 03/21/25 10:34 PM   Result Value Ref Range    POC Glucose, Blood 123 (H) 65 - 99 mg/dL   LACTIC ACID    Collection Time: 03/21/25 11:10 PM   Result Value Ref Range    Lactic Acid 1.1 0.5 - 2.0 mmol/L   Heparin Xa (Unfractionated)    Collection Time: 03/21/25 11:15 PM   Result Value Ref Range    Heparin Xa (UFH) 0.57 IU/mL   POCT glucose device results    Collection Time: 03/21/25 11:51 PM   Result Value Ref Range    POC Glucose, Blood 113 (H) 65 - 99 mg/dL   LACTIC ACID    Collection Time: 03/22/25  3:10 AM   Result Value Ref Range    Lactic  Acid 0.8 0.5 - 2.0 mmol/L   CBC with Differential    Collection Time: 03/22/25  3:10 AM   Result Value Ref Range    WBC 14.1 (H) 4.8 - 10.8 K/uL    RBC 3.82 (L) 4.20 - 5.40 M/uL    Hemoglobin 11.5 (L) 12.0 - 16.0 g/dL    Hematocrit 38.3 37.0 - 47.0 %    .3 (H) 81.4 - 97.8 fL    MCH 30.1 27.0 - 33.0 pg    MCHC 30.0 (L) 32.2 - 35.5 g/dL    RDW 57.8 (H) 35.9 - 50.0 fL    Platelet Count 188 164 - 446 K/uL    MPV 9.9 9.0 - 12.9 fL    Neutrophils-Polys 82.40 (H) 44.00 - 72.00 %    Lymphocytes 7.90 (L) 22.00 - 41.00 %    Monocytes 8.50 0.00 - 13.40 %    Eosinophils 0.00 0.00 - 6.90 %    Basophils 0.30 0.00 - 1.80 %    Immature Granulocytes 0.90 0.00 - 0.90 %    Nucleated RBC 0.00 0.00 - 0.20 /100 WBC    Neutrophils (Absolute) 11.59 (H) 1.82 - 7.42 K/uL    Lymphs (Absolute) 1.11 1.00 - 4.80 K/uL    Monos (Absolute) 1.20 (H) 0.00 - 0.85 K/uL    Eos (Absolute) 0.00 0.00 - 0.51 K/uL    Baso (Absolute) 0.04 0.00 - 0.12 K/uL    Immature Granulocytes (abs) 0.12 (H) 0.00 - 0.11 K/uL    NRBC (Absolute) 0.00 K/uL   Comp Metabolic Panel (CMP)    Collection Time: 03/22/25  3:10 AM   Result Value Ref Range    Sodium 132 (L) 135 - 145 mmol/L    Potassium 4.8 3.6 - 5.5 mmol/L    Chloride 97 96 - 112 mmol/L    Co2 24 20 - 33 mmol/L    Anion Gap 11.0 7.0 - 16.0    Glucose 128 (H) 65 - 99 mg/dL    Bun 34 (H) 8 - 22 mg/dL    Creatinine 0.68 0.50 - 1.40 mg/dL    Calcium 9.2 8.5 - 10.5 mg/dL    Correct Calcium 9.6 8.5 - 10.5 mg/dL    AST(SGOT) 33 12 - 45 U/L    ALT(SGPT) 16 2 - 50 U/L    Alkaline Phosphatase 48 30 - 99 U/L    Total Bilirubin 0.4 0.1 - 1.5 mg/dL    Albumin 3.5 3.2 - 4.9 g/dL    Total Protein 5.9 (L) 6.0 - 8.2 g/dL    Globulin 2.4 1.9 - 3.5 g/dL    A-G Ratio 1.5 g/dL   ESTIMATED GFR    Collection Time: 03/22/25  3:10 AM   Result Value Ref Range    GFR (CKD-EPI) 87 >60 mL/min/1.73 m 2   TROPONIN    Collection Time: 03/22/25  3:10 AM   Result Value Ref Range    Troponin T 206 (H) 6 - 19 ng/L   POCT glucose device results     Collection Time: 03/22/25  6:24 AM   Result Value Ref Range    POC Glucose, Blood 125 (H) 65 - 99 mg/dL   Heparin Anti-Xa    Collection Time: 03/22/25  7:20 AM   Result Value Ref Range    Heparin Xa (UFH) 0.45 IU/mL       Imaging:  EC-ECHOCARDIOGRAM COMPLETE W/O CONT    (Results Pending)   CL-LEFT HEART CATHETERIZATION WITH POSSIBLE INTERVENTION    (Results Pending)         EKG tracings personally reviewed by me sinus rhythm, deep anterior T wave inversions    Echocardiogram- ejection fraction 30% reportedly with wall motion abnormality    All pertinent features of laboratory and imaging reviewed including primary images where applicable      Principal Problem:    Acute HFrEF (heart failure with reduced ejection fraction) (HCC) (POA: Yes)  Active Problems:    Essential hypertension (POA: Yes)    Dyslipidemia (POA: Yes)    Type 2 diabetes mellitus with ophthalmic complication (HCC) (POA: Yes)    Acute on chronic respiratory failure with hypoxia (HCC) (POA: Yes)    Shock (HCC) (POA: Unknown)    Accident due to mechanical fall without injury (POA: Unknown)    Chronic obstructive lung disease (HCC) (POA: Unknown)    Intercostal pain (POA: Unknown)    Hyponatremia (POA: Unknown)  Resolved Problems:    PAD (peripheral artery disease) (HCC) (POA: Yes)    Leukocytosis (POA: Unknown)      Assessment / Plan:  Given the patient's elevated troponin, LV dysfunction, there is a high likelihood that the patient's presentation is due to obstructive coronary artery disease and the patient would likely benefit from percutaneous coronary intervention if technically feasible.  Interventional Cardiology consultation was requested by Dr. House to assess the patient's candidacy for PCI and to provide complete informed consent for a possible intervention, which is a procedure that is not performed by General Cardiology and for which General Cardiology cannot provide complete informed consent.    I specifically discussed with the patient  that the risk of a life-altering or life-threatening complication such as major bleeding requiring additional intervention, disabling stroke, major heart attack, need for emergency heart surgery, and/or death was approximately 1-2% with a routine PCI procedure. If there was a need for advanced techniques such as rotational atherectomy, laser atherectomy, or intracoronary lithotripsy, the risk of major complications increases.  Additionally, we discussed that, due to the patient's decline in LV function, respiratory status, they are at increased risk for major complications if PCI is performed.  We discussed that the risk of kidney injury requiring temporary or permanent dialysis is very low in patients with normal renal function, but ranges from 1-5% in patients with abnormal renal function.  The risk of minor complications such as significant pain, infection, minor heart attack, or minor bleeding not requiring additional intervention can be as high as 5-10%.    We also discussed that a coronary artery stent is a metal scaffold that is permanently implanted in a blood vessel of the heart and would require several months of dual antiplatelet therapy in addition to lifelong therapy with at least one antiplatelet agent and that noncompliance with antiplatelet therapy could result in a blood clot forming within the stent; potentially resulting in a major or life-threatening heart attack.  We further discussed that, with contemporary stents, the risk of stent failure is approximately 1-2%/year.  The patient confirmed their understanding of the importance of adherence with antiplatelet therapy and denied any recent serious bleeding episodes that would be a clear contraindication to initiating antiplatelet therapy.  The patient also denied any upcoming urgent surgeries that could not be postponed if they were started on dual antiplatelet therapy.    I, performing physician independently assessed the patient, reviewed all  the pertinent data and feel that they are an appropriate candidate for cardiac catheterization with PCI.  Further recommendations will be pending findings at time of cardiac catheterization.          I personally discussed her case with  Dr Murphy Downey M.D.    No future appointments.    It is my pleasure to participate in the care of Ms. Castañeda.  Please do not hesitate to contact me with questions or concerns.    Pacheco Franklin M.D.    3/22/2025

## 2025-03-22 NOTE — PROGRESS NOTES
Cardiology Follow-up Consult Note    Date of Service:    3/22/2025      Consulting Physician: LYNN Fernandez Jr.O.    Name:   Kimberly Castañeda     YOB: 1945  Age:   79 y.o.  female   MRN:   [id number]      Assessment/Recommendations:   1.  Chest pain that is been constant since last night.  Troponins have remained flat in the 200s.  If this is an acute occlusion, would expect troponins to have gone higher.  Nevertheless, she does have a history of coronary artery disease with stent placement and she is a diabetic on insulin along with hypertension and hyperlipidemia.  Therefore she certainly has risk factors for coronary artery disease.  Although this could be Takotsubo's with the stress of being down for 3 hours, it still not unreasonable to proceed with cardiac catheterization with possible percutaneous coronary mention.  Patient still would like to proceed. CT outside hospital negative for aortic dissection.   -NPO for cath today   -continue heparin gtt   -echo ordered   -ECASA 81 mg po qd    2.  HFrEF, new diagnosis.  She was volume overloaded better today negative 1.1 L out.   -continue with IV lasix   -BP soft so start GDMT once cath done and closer to euvolemia    3.  Hyperlipidemia, on simvastatin, LDL 58 controlled    4.  BP arm differential CT did not suggest subclavian stenosis, no symptoms.  Go by BP on right arm.  Off pressors.    Disposition:  1 day for cath and med adjustment    Patient Identifier/Subjective: 79-year-old female with history of coronary artery disease with remote stenting in the past for which she is on aspirin, diabetes mellitus on insulin and Farxiga, hyperlipidemia, hypertension, who was transferred from the outside hospital after presenting with a fall.  Patient developed respiratory distress and newly noted decrease in LVEF and hypotension.  Cardiology was consulted for decreased LVEF.    Patient is still complaining of chest pain across her chest, controlled with  pain medications.  This has been ongoing since last night.  Son is at bedside.  He states she had fallen around 9 AM and had to crawl around for about 3 hours.  He was able to get home at noon time and help her up.    He also reports she has always had a discrepancy in blood pressure with lower blood pressure in the left arm and higher in the right arm.    Interim History: Currently on heparin drip and nor epi drip.    Pertinent Labs/Studies: WBCs down lower to 14.1, sodium 132, creatinine 0.68, troponin flat to 19, 231.  -1.2 L out.    Telemetry Reviewed (personally reviewed) sinus rhythm    All other review of systems reviewed and negative.    Past medical, surgical, social, and family history reviewed and unchanged from admission except as noted in assessment and plan.    Medications Prior to Admission   Medication Sig Dispense Refill Last Dose/Taking    LANTUS SOLOSTAR 100 UNIT/ML Solution Pen-injector injection Inject 10 Units under the skin every morning.   3/20/2025 Morning    Melatonin 10 MG Tab Take 10 mg by mouth at bedtime as needed.   3/19/2025 Bedtime    ascorbic acid (VITAMIN C) 500 MG tablet Take 500 mg by mouth every day.   3/20/2025 Morning    furosemide (LASIX) 20 MG Tab Take 1 Tablet by mouth every day. Hold for sbp less 105 30 Tablet 0 3/20/2025 Morning    potassium chloride SA (K-DUR) 10 MEQ Tab CR Take 1 Tablet by mouth every day. 30 Tablet 0 3/20/2025 Morning    metFORMIN (GLUCOPHAGE) 850 MG Tab Take 850 mg by mouth 3 times a day with meals.   3/20/2025 Morning    benazepril (LOTENSIN) 20 MG Tab Take 20 mg by mouth every day.   3/20/2025 Morning    CALCIUM PO Take 1 Tablet by mouth every day.   3/20/2025 Morning    Coenzyme Q10 (CO Q 10 PO) Take 1 Tablet by mouth every day.   3/20/2025 Morning    aspirin (ASA) 81 MG Chew Tab chewable tablet Chew 1 tablet every day. 100 tablet 0 3/20/2025 Morning    pioglitazone (ACTOS) 45 MG Tab Take 45 mg by mouth every morning.   3/20/2025 Morning     "simvastatin (ZOCOR) 20 MG Tab Take 1 Tab by mouth every evening. 30 Tab 8 3/19/2025 Evening    metoprolol (LOPRESSOR) 25 MG Tab Take 1 Tab by mouth 2 times a day. 60 Tab 8 3/20/2025 Morning    FLUoxetine (PROZAC) 20 MG Cap Take 1 Cap by mouth every day. 30 Cap 8 3/20/2025 Morning    acetaminophen (TYLENOL) 500 MG Tab Take 500-1,000 mg by mouth every 6 hours as needed for Mild Pain.   Unknown    dapagliflozin propanediol (FARXIGA) 5 MG Tab Take 5 mg by mouth every day.   Unknown    nitroglycerin (NITROSTAT) 0.4 MG SL Tab Place 1 Tab under tongue as needed for Chest Pain. 25 Tab 1     NON SPECIFIED Portable Oxygen  Dx: COPD 1 Each 11        Inpatient Medications Reviewed    Allergies   Allergen Reactions    Aldactone [Kdc:Yellow Dye+Spironolactone] Palpitations    Percocet [Apap-Fd&C Red #40 [place]-Oxycodone] Rash     Full body  Patient states she can take if she also takes Benadryl  Tolerated acetaminophen 2020         Intake/Output Summary (Last 24 hours) at 3/22/2025 0547  Last data filed at 3/22/2025 0400  Gross per 24 hour   Intake 541.15 ml   Output 1750 ml   Net -1208.85 ml        Physical Exam  Body mass index is 24.91 kg/m².  BP 98/50   Pulse 83   Temp 37.1 °C (98.7 °F) (Temporal)   Resp (!) 22   Ht 1.676 m (5' 6\")   Wt 70 kg (154 lb 5.2 oz)   SpO2 98%   Vitals:    03/22/25 0345 03/22/25 0400 03/22/25 0415 03/22/25 0430   BP: 117/58 105/53 107/51 98/50   Pulse: 81 82 82 83   Resp: (!) 23 (!) 23 (!) 25 (!) 22   Temp:  37.1 °C (98.7 °F)     TempSrc:  Temporal     SpO2: 94% 98% 97% 98%   Weight:       Height:         Oxygen Therapy:  Pulse Oximetry: 98 %, O2 (LPM): 6, O2 Delivery Device: Silicone Nasal Cannula    Physical Exam  Constitutional:       Appearance: Normal appearance.   Neck:      Vascular: No JVD.   Cardiovascular:      Rate and Rhythm: Normal rate and regular rhythm.      Pulses: Normal pulses and intact distal pulses.      Heart sounds: S1 normal and S2 normal. No murmur heard.     No " friction rub. No gallop.   Pulmonary:      Effort: Pulmonary effort is normal. No respiratory distress.      Breath sounds: Normal breath sounds. No wheezing or rales.   Neurological:      Mental Status: She is alert and oriented to person, place, and time.         Labs (personally reviewed and notable for):   Lab Results   Component Value Date/Time    SODIUM 132 (L) 03/22/2025 03:10 AM    POTASSIUM 4.8 03/22/2025 03:10 AM    CHLORIDE 97 03/22/2025 03:10 AM    CO2 24 03/22/2025 03:10 AM    GLUCOSE 128 (H) 03/22/2025 03:10 AM    BUN 34 (H) 03/22/2025 03:10 AM    CREATININE 0.68 03/22/2025 03:10 AM    BUNCREATRAT 34 (H) 12/03/2019 01:48 PM      Lab Results   Component Value Date/Time    WBC 14.1 (H) 03/22/2025 03:10 AM    RBC 3.82 (L) 03/22/2025 03:10 AM    HEMOGLOBIN 11.5 (L) 03/22/2025 03:10 AM    HEMATOCRIT 38.3 03/22/2025 03:10 AM    .3 (H) 03/22/2025 03:10 AM    MCH 30.1 03/22/2025 03:10 AM    MCHC 30.0 (L) 03/22/2025 03:10 AM    MPV 9.9 03/22/2025 03:10 AM    NEUTSPOLYS 82.40 (H) 03/22/2025 03:10 AM    LYMPHOCYTES 7.90 (L) 03/22/2025 03:10 AM    MONOCYTES 8.50 03/22/2025 03:10 AM    EOSINOPHILS 0.00 03/22/2025 03:10 AM    BASOPHILS 0.30 03/22/2025 03:10 AM    ANISOCYTOSIS 1+ 07/15/2021 06:10 PM      Lab Results   Component Value Date/Time    CHOLSTRLTOT 173 03/21/2025 01:45 PM    LDL 58 03/21/2025 01:45 PM     03/21/2025 01:45 PM    TRIGLYCERIDE 44 03/21/2025 01:45 PM       Lab Results   Component Value Date/Time    TROPONINT 231 (H) 03/21/2025 1740     Lab Results   Component Value Date/Time    NTPROBNP 27763 (H) 03/21/2025 2602       I personally reviewed all blood test results, EKG tracings and images of his cardiac testings.     Matilda House MD  Cardiologist, [facility]    Please note that this dictation was created using voice recognition software. I have made every reasonable attempt to correct obvious errors, but it is possible there are errors of grammar  and possibly content that I did not discover before finalizing the note.

## 2025-03-22 NOTE — PROCEDURES
Interventional cardiology report    Indication for procedure-non-ST elevation MI    Procedures performed  Coronary angiogram, left heart catheterization  IVUS guided PCI of proximal circumflex artery using 4.5 x 12, 4.5 x 20 mm Synergy drug-eluting stents  IVUS guided PCI of first marginal branch using 3.5 x 12 mm Synergy drug-eluting stent  IVUS of left main      Recommendations  Dual antiplatelet therapy, guideline directed medical therapy      Coronary angiogram findings  Left main calcified with eccentric stenosis, IVUS showed minimal luminal area 12 mm², nonsignificant stenosis    Left anterior descending artery has severe calcification, mild to moderate 30 to 50% stenosis, nonobstructive.    Left circumflex artery is severely calcified, large dominant vessel, eccentric calcified stenosis very hard to visualize by angiogram, 70% stenosis in the proximal portion by IVUS, large first marginal branch has 90% ostial stenosis  Distally gives large second marginal branch, large posterolateral branches.    Right coronary artery small nondominant vessel  Left ventriculogram showed ejection fraction 30% with global hypokinesis  LVEDP measured at 70 mmHg      After informed consent patient was brought to cardiac catheterization laboratory, timeout was performed, she was sterilely prepped, draped . right radial artery cannulation was performed using 6 Urdu sheath, coronary angiogram performed using JR4, JL 3.5 diagnostic catheter, JR4 catheter was used to perform left ventriculogram.  EBU 3.5 guide catheter was used to engage left main, run-through wire was advanced to left circumflex artery, IVUS of left main was performed showed minimal luminal area 12 mm², nonsignificant stenosis.  Then ostial first marginal branch dilated using 3.0 x 15 mm noncompliant balloon, it has 90% stenosis lesion length 10 mm.  A 3.5 x 12 mm Synergy drug-eluting stent was placed, postdilated with 3.5 mm NC balloon.  IVUS was performed showed  good expansion of the stent but stent is slightly extending into main left circumflex artery, main left circumflex artery is severely calcified with diffuse 60 to 70% stenosis.  Then using 3.5 x 15 mm NC balloon, 3.0 x 12 mm NC balloon and kissing balloon was performed to optimize the stent, there was some dissection in the main left circumflex artery due to balloon inflation, this was covered using 4.5 x 20 mm Synergy drug-eluting stent, IVUS was performed showed proximal left circumflex artery still has significant stenosis, it is difficult to visualize angiographically, this was treated with another 4.5 x 12 mm Synergy drug-eluting stent, at the end no significant residual stenosis, FABRICE-3 flow into distal circumflex artery, marginal branches.  IVUS showed adequate stent expansion, good apposition.    Specimens none  Contrast 170 cc  Estimated blood loss less than 20 cc    Patient tolerated the procedure well, transferred to floor.  I supervised moderate sedation over a trained independent nursing staff, sedation start time 1416, sedation end time 1516.

## 2025-03-22 NOTE — CONSULTS
Hospital Medicine Consultation    Date of Service  3/22/2025    Referring Physician  Elian Oconnor M.D.    Consulting Physician  Murphy Downey M.D.    Reason for Consultation  cardiomyopathy    History of Presenting Illness  79 y.o. female who presented 3/21/2025 with shortness of breath.  Ms. Castañeda has a proximal history of coronary artery disease with prior stent and as well as normal ejection fraction by echocardiogram 2022, oxygen dependent COPD, that presented to the emergency room in [facility] with shortness of breath.  Echocardiogram there revealed apical hypokinesis suggestive of possible stress cardiomyopathy and ejection fraction of 30%.  Due to cardiogenic shock she required IV Levophed drip.  Prior to transfer she was given broad-spectrum antibiotics as well as Solu-Medrol for possible pneumonia and COPD exacerbation.  She was transferred here for direct admit to the intensive care unit with cardiology consultation.  Troponins were in the low 200s as she has been treated with IV heparin drip.  She was tapered off Levophed drip.  On 3/22/2025 she went to the cardiac Cath Lab with stent to the circumflex x 2 and first marginal branch.  After returning from the Cath Lab she went into new onset atrial fibrillation with rapid ventricular sponsor rate in the 120s.    Review of Systems  Review of Systems   Respiratory:  Positive for shortness of breath.    Cardiovascular:  Positive for chest pain.       Past Medical History   has a past medical history of Abnormal mammogram (3/22/2018), Arthritis/arthropathy of multiple joints (12/12/2018), Back pain, BRBPR (bright red blood per rectum) (6/1/2017), Bronchitis, Cardiac arrhythmia, Cellulitis and abscess of hand (12/12/2018), Chickenpox, Controlled type 2 diabetes mellitus without complication (HCC) (4/27/2017), Coronary artery disease involving native coronary artery of native heart without angina pectoris (4/27/2017), Cough, Diarrhea,  Dyslipidemia (4/27/2017), Essential hypertension (4/27/2017), Exposure (9/20/2017), Nepali measles, GI bleed, H/O colonoscopy with polypectomy (6/1/2017), History of recent dental procedure (10/16/2019), Hypoxemia (4/27/2017), Lipoma of torso (7/12/2018), Low hemoglobin (10/16/2019), Mumps, Obesity, PAD (peripheral artery disease) ([facility]) (4/27/2017), Painful joint, Post herpetic neuralgia (8/22/2019), Reactive depression (4/27/2017), Rhinitis, Shingles outbreak (06/2019), Sore muscles, Swelling of lower extremity, Tobacco abuse (4/27/2017), Tonsillitis, Type 2 diabetes mellitus with ophthalmic complication ([facility]) (3/22/2018), Valvular heart disease, Wears glasses, Weight loss, and Wheezing.    She has no past medical history of DVT (deep venous thrombosis) ([facility]).    Surgical History   has a past surgical history that includes stent placement (2007); tubal ligation (1983); hysterectomy laparoscopy (1988); appendectomy; vein stripping; tonsillectomy; pr upper gi endoscopy,diagnosis (N/A, 7/16/2021); pr upper gi endoscopy,biopsy (N/A, 7/16/2021); pr colonoscopy-flexible (N/A, 7/17/2021); pr colonoscpy,flex,w/dir submuc inject (N/A, 7/17/2021); pr colonoscopy,flex,w/control, bleeding (N/A, 7/17/2021); and colonoscopy with apc (N/A, 7/17/2021).    Family History  family history includes Alcohol/Drug in her father; Arthritis in her father; Cancer in her maternal aunt and mother; Diabetes in her brother; Heart Disease in her father.    Social History   reports that she has quit smoking. Her smoking use included cigarettes. She has a 15 pack-year smoking history. She has never used smokeless tobacco. She reports that she does not drink alcohol and does not use drugs.    Medications  Prior to Admission Medications   Prescriptions Last Dose Informant Patient Reported? Taking?   CALCIUM PO 3/20/2025 Morning Patient Yes Yes   Sig: Take 1 Tablet by mouth every day.   Coenzyme Q10 (CO Q 10 PO) 3/20/2025 Morning Patient Yes Yes    Sig: Take 1 Tablet by mouth every day.   FLUoxetine (PROZAC) 20 MG Cap 3/20/2025 Morning Patient No Yes   Sig: Take 1 Cap by mouth every day.   LANTUS SOLOSTAR 100 UNIT/ML Solution Pen-injector injection 3/20/2025 Morning  Yes Yes   Sig: Inject 10 Units under the skin every morning.   Melatonin 10 MG Tab 3/19/2025 Bedtime  Yes Yes   Sig: Take 10 mg by mouth at bedtime as needed.   NON SPECIFIED  Patient No No   Sig: Portable Oxygen  Dx: COPD   acetaminophen (TYLENOL) 500 MG Tab Unknown  Yes No   Sig: Take 500-1,000 mg by mouth every 6 hours as needed for Mild Pain.   ascorbic acid (VITAMIN C) 500 MG tablet 3/20/2025 Morning  Yes Yes   Sig: Take 500 mg by mouth every day.   aspirin (ASA) 81 MG Chew Tab chewable tablet 3/20/2025 Morning Patient No Yes   Sig: Chew 1 tablet every day.   benazepril (LOTENSIN) 20 MG Tab 3/20/2025 Morning Patient Yes Yes   Sig: Take 20 mg by mouth every day.   dapagliflozin propanediol (FARXIGA) 5 MG Tab Unknown  Yes No   Sig: Take 5 mg by mouth every day.   furosemide (LASIX) 20 MG Tab 3/20/2025 Morning  No Yes   Sig: Take 1 Tablet by mouth every day. Hold for sbp less 105   metFORMIN (GLUCOPHAGE) 850 MG Tab 3/20/2025 Morning Patient Yes Yes   Sig: Take 850 mg by mouth 3 times a day with meals.   metoprolol (LOPRESSOR) 25 MG Tab 3/20/2025 Morning Patient No Yes   Sig: Take 1 Tab by mouth 2 times a day.   nitroglycerin (NITROSTAT) 0.4 MG SL Tab  Patient No No   Sig: Place 1 Tab under tongue as needed for Chest Pain.   pioglitazone (ACTOS) 45 MG Tab 3/20/2025 Morning Patient Yes Yes   Sig: Take 45 mg by mouth every morning.   potassium chloride SA (K-DUR) 10 MEQ Tab CR 3/20/2025 Morning  No Yes   Sig: Take 1 Tablet by mouth every day.   simvastatin (ZOCOR) 20 MG Tab 3/19/2025 Evening Patient No Yes   Sig: Take 1 Tab by mouth every evening.      Facility-Administered Medications: None       Allergies  Allergies   Allergen Reactions    Aldactone [Kdc:Yellow Dye+Spironolactone] Palpitations     Percocet [Apap-Fd&C Red #40 [place]-Oxycodone] Rash     Full body  Patient states she can take if she also takes Benadryl  Tolerated acetaminophen 2020       Physical Exam  Temp:  [36.7 °C (98.1 °F)-37.3 °C (99.2 °F)] 37.1 °C (98.8 °F)  Pulse:  [76-97] 80  Resp:  [16-36] 24  BP: ()/(42-70) 121/58  SpO2:  [88 %-99 %] 98 %    Physical Exam  Vitals and nursing note reviewed.   Cardiovascular:      Rate and Rhythm: Tachycardia present. Rhythm irregular.   Pulmonary:      Effort: Pulmonary effort is normal.      Comments: Few crackles  Abdominal:      General: There is no distension.      Tenderness: There is no abdominal tenderness.   Musculoskeletal:      Right lower leg: No edema.      Left lower leg: No edema.      Comments: Right wrist without hematoma   Neurological:      General: No focal deficit present.      Mental Status: She is oriented to person, place, and time.         Fluids      Laboratory  Recent Labs     03/21/25  1345 03/22/25  0310   WBC 16.8* 14.1*   RBC 4.24 3.82*   HEMOGLOBIN 13.1 11.5*   HEMATOCRIT 42.5 38.3   .2* 100.3*   MCH 30.9 30.1   MCHC 30.8* 30.0*   RDW 57.9* 57.8*   PLATELETCT 260 188   MPV 10.2 9.9     Recent Labs     03/21/25  1345 03/21/25  1740 03/22/25  0310   SODIUM 132* 134* 132*   POTASSIUM 5.1 5.3 4.8   CHLORIDE 95* 95* 97   CO2 17* 22 24   GLUCOSE 410* 281* 128*   BUN 33* 32* 34*   CREATININE 0.79 0.79 0.68   CALCIUM 9.5 10.0 9.2     Recent Labs     03/21/25  1740   APTT 26.3   INR 0.98          Recent Labs     03/21/25  1345   TRIGLYCERIDE 44      LDL 58        Imaging  EC-ECHOCARDIOGRAM COMPLETE W/O CONT    (Results Pending)   CL-LEFT HEART CATHETERIZATION WITH POSSIBLE INTERVENTION    (Results Pending)       Assessment/Plan  * Acute HFrEF (heart failure with reduced ejection fraction) (HCC)- (present on admission)  Assessment & Plan  EF 30% by echo and heart cath  Ischemic  S/p stents x 3  Goal directed medical therapy once her blood pressure  improves    Acute coronary artery obstruction without MI ([facility])- (present on admission)  Assessment & Plan  With stenting x 3 on 3/22/2025  Dual antiplatelet therapy  Start very low-dose beta-blocker  Continuous telemetry monitoring  Increase Zocor to high intensity statin.  LDL 58    Atrial fibrillation with rapid ventricular response ([facility])- (present on admission)  Assessment & Plan  New onset post heart cath  Rate 120's  IV heparin drip  IV amiodarone bolus and drip  Low dose metoprolol for rate control (she was recently in shock thus low dose).    Shock ([facility])- (present on admission)  Assessment & Plan  Cardiogenic shock  S/p pressors  Hold on further diuretics    Hyponatremia- (present on admission)  Assessment & Plan  Hypervolemic  Follow up BMP in the morning after diuretics    Acute on chronic respiratory failure with hypoxia ([facility])- (present on admission)  Assessment & Plan  Due to volume overload  Supplemental oxygen  Further diuresis    Type 2 diabetes mellitus with ophthalmic complication ([facility])- (present on admission)  Assessment & Plan  Add sliding scale  HbA1c quite elevated at 12    Essential hypertension- (present on admission)  Assessment & Plan  BP is now low

## 2025-03-22 NOTE — CARE PLAN
Problem: Knowledge Deficit - Standard  Goal: Patient and family/care givers will demonstrate understanding of plan of care, disease process/condition, diagnostic tests and medications  Outcome: Progressing     Problem: Fall Risk  Goal: Patient will remain free from falls  Outcome: Progressing   The patient is Watcher - Medium risk of patient condition declining or worsening    Shift Goals  Clinical Goals: cath lab; tele monitoring  Patient Goals: cath lab  Family Goals: up to date on poc    Progress made toward(s) clinical / shift goals:  Pt at cath lab. Pt moderate fall risk.     Patient is not progressing towards the following goals:

## 2025-03-22 NOTE — CARE PLAN
The patient is Watcher - Medium risk of patient condition declining or worsening    Shift Goals  Clinical Goals: Monitor blood sugars, hemodynamic stability  Patient Goals: Rest  Family Goals: Updates    Progress made toward(s) clinical / shift goals:    Problem: Knowledge Deficit - Standard  Goal: Patient and family/care givers will demonstrate understanding of plan of care, disease process/condition, diagnostic tests and medications  Outcome: Progressing     Problem: Pain - Standard  Goal: Alleviation of pain or a reduction in pain to the patient’s comfort goal  Outcome: Progressing     Problem: Fall Risk  Goal: Patient will remain free from falls  Outcome: Progressing     Problem: Skin Integrity  Goal: Skin integrity is maintained or improved  Outcome: Progressing       Patient is not progressing towards the following goals:

## 2025-03-22 NOTE — PROGRESS NOTES
Pt currently on 180 protocol with current insulin rate a 0.5.  Notified MD Schrader of continued decrease in inulin rates  OK to transition to SQ.

## 2025-03-22 NOTE — CONSULTS
Cardiology Initial Consult Note    Date of note:    3/21/2025      Consulting Physician: LYNN Fernandez Jr.O.    Name:   Kimberly Castañeda     YOB: 1945  Age:   79 y.o.  female   MRN:   [id number]    Assessment/Recommendations:  1.  Troponin elevation.  This is likely type II non-ST elevation MI from her fall.  However she did not hit her chest.  Her current chest pain is not clear.  ECG shows no acute ST elevation changes.  Would consider starting her on heparin until we can trend out the troponin.  She does have a history of prior coronary artery disease and is a diabetic so has multiple risk factors for coronary artery disease.  I discussed the above with her and I discussed recommendations for cardiac catheterization with possible percutaneous coronary mention.  Discussed with her although she may wish to be a DO NOT INTUBATE DO NOT RESUSCITATE, this will have to be reversed during the procedure.  Patient would be agreeable to that.The risks, benefits, and alternatives to coronary angiography with possible percutaneous coronary intervention and IV sedation were discussed in great detail. Specific risks mentioned include bleeding that may require blood transfusion, kidney damage from IV contrast allergy, infection, allergic reaction, arterial damage that may require intervention or surgery, cardiac perforation with possible tamponade requiring honey-cardiocentesis or possible open heart surgery. We also discussed in great detail a stent is placed, they will have to be on two antiplatelet agents (blood thinners) for up to 1 year.  Verified with patient no planned surgeries/procedures are planned within the next year.  Verified with patient no recent bleeding.  In addition, we discussed that 10% of patients will experience small to moderate bruising at the side of the arterial puncture. Lastly the risks of heart attack, stroke, and death were discussed; the risks of major complications such as heart  attack or stroke caused by the angiogram is less than 1%; the risk of death is approximately 1 in 1000. The patient verbalized understanding of these potential complications and wishes to proceed with this procedure.   -trend troponin   -heparin gtt   -ASA 81 mg po qd   -recheck echo here (has funny murmur vs frictional rub)   -NPO after midnight for possible cath     2.  HFrEF.  Currently hypotensive, possible cardiogenic, but has elevated WBC too. Hold GDMT till off pressors (on norepi).  Patient is volume overloaded so agree with IV lasix diuresis.    3.  GLF, no lightheadedness or dizziness.      Disposition: 2 days or so    Reason for Consultation: heart failure with decreased LVEF new diagnosis    HPI:  Kimberly Castañeda is a 79 y.o. female with history of coronary artery disease, patient reports a remote history of stent placement for which she still takes aspirin, diabetes mellitus on insulin and Farxiga, hyperlipidemia on simvastatin, hypertension on beta-blocker, presented to the outside hospital after a fall.  Patient subsequently transferred over to [facility] for higher level of care due to respiratory distress, newly noted decreased LVEF, and hypotension.    The patient reports she was in her usual state of health until Wednesday, she went to take her medications and slipped on the floor and fell on her left knee.  She denies hitting her head or chest.  Her son who lives with her and then lifted her up under her arms.  She started noticing some chest discomfort/tightness yesterday.  It has continued.  Troponin at the outside hospital was mildly elevated.    She has had about 3 falls in the last couple years.  They have all been mechanical falls.  She denies any lightheadedness or dizziness.  All the falls have just been when she felt her legs were weak and gave out.  Her normal activities does include some gardening or yard work and some of the house chores.    Pertinent labs here shows a WBC of 16.8, sodium  "132, glucose is 410, creatinine is 0.79, initial troponin 219, proBNP is 23,215.    Cardiac Imaging and Procedures Review (personally reviewed and notable for):    EKG dated 3/21/25:  sinus rhythm, poor R wave progression, T wave inversions in the anterolateral leads, consider ischemia, low voltages in the limb leads, rightward axis, prolonged QT interval, compared to prior ECG of 10/21/2022, T wave inversions are new    Echo dated 10/21/2022: Normal left ventricular cavity size, wall thickness, and systolic function, visually estimated LVEF around 55%.  Reported grade 2 diastolic dysfunction.  Enlarged right ventricular static cavity size with low normal systolic function.  Biatrial enlargement.  Mitral annular calcification.  Sclerotic aortic valve leaflets.  Overall no significant valvular stenosis or regurgitation.  Aortic root is not well-visualized.    Patient had an outside echo report 3/21/2025 that was reported out as apical dilatation with severely decreased EF, 30 to 35%.  Right ventricle had hypokinetic apex.  No significant valvular stenosis or regurgitation.  Radiology test Review:  No orders to display       Physical Exam  Body mass index is 24.91 kg/m².  /66   Pulse 82   Temp 37.1 °C (98.7 °F) (Temporal)   Resp (!) 25   Ht 1.676 m (5' 6\")   Wt 70 kg (154 lb 5.2 oz)   SpO2 94%   Vitals:    03/21/25 1334 03/21/25 1343 03/21/25 1348 03/21/25 1356   BP: 100/66      Pulse: 90  86 82   Resp: (!) 28  (!) 26 (!) 25   Temp: 37.1 °C (98.7 °F)      TempSrc: Temporal      SpO2: 96%  91% 94%   Weight: 70.7 kg (155 lb 13.8 oz) 70 kg (154 lb 5.2 oz)     Height: 1.676 m (5' 6\")        Oxygen Therapy:  Pulse Oximetry: 94 %, O2 (LPM): 6, O2 Delivery Device: Silicone Nasal Cannula    Physical Exam  Constitutional:       Appearance: Normal appearance.   Eyes:      Extraocular Movements: Extraocular movements intact.      Conjunctiva/sclera: Conjunctivae normal.   Neck:      Vascular: JVD present. No carotid " bruit.   Cardiovascular:      Rate and Rhythm: Normal rate and regular rhythm.      Pulses:           Radial pulses are 2+ on the right side and 2+ on the left side.        Posterior tibial pulses are 1+ on the right side and 1+ on the left side.      Heart sounds: Normal heart sounds. No murmur heard.     No friction rub. No gallop.      Comments: ? Frictional rub  Pulmonary:      Effort: Pulmonary effort is normal. No respiratory distress.      Breath sounds: Examination of the right-lower field reveals rales. Examination of the left-lower field reveals rales. Rales present. No wheezing.   Abdominal:      General: Bowel sounds are normal.      Palpations: Abdomen is soft.   Musculoskeletal:      Cervical back: Neck supple.      Right lower leg: No edema.      Left lower leg: No edema.   Skin:     General: Skin is warm and dry.   Neurological:      Mental Status: She is alert and oriented to person, place, and time. Mental status is at baseline.   Psychiatric:         Mood and Affect: Mood normal.          Problem list:  Principal Problem:    Acute HFrEF (heart failure with reduced ejection fraction) ([facility]) (POA: Yes)  Active Problems:    Essential hypertension (POA: Yes)    Dyslipidemia (POA: Yes)    PAD (peripheral artery disease) ([facility]) (POA: Yes)    Type 2 diabetes mellitus with ophthalmic complication ([facility]) (POA: Yes)    Acute on chronic respiratory failure with hypoxia ([facility]) (POA: Yes)    Shock ([facility]) (POA: Unknown)    Accident due to mechanical fall without injury (POA: Unknown)    Chronic obstructive lung disease ([facility]) (POA: Unknown)    Leukocytosis (POA: Unknown)  Resolved Problems:    * No resolved hospital problems. *      Past Medical History:   Diagnosis Date    Abnormal mammogram 3/22/2018    Arthritis/arthropathy of multiple joints 12/12/2018    Back pain     BRBPR (bright red blood per rectum) 6/1/2017    Bronchitis     Cardiac arrhythmia     Cellulitis and abscess of hand 12/12/2018    Chickenpox      Controlled type 2 diabetes mellitus without complication (HCC) 4/27/2017    Coronary artery disease involving native coronary artery of native heart without angina pectoris 4/27/2017    Cough     Diarrhea     Dyslipidemia 4/27/2017    Essential hypertension 4/27/2017    Exposure 9/20/2017    [place] measles     GI bleed     H/O colonoscopy with polypectomy 6/1/2017    History of recent dental procedure 10/16/2019    Hypoxemia 4/27/2017    Lipoma of torso 7/12/2018    Low hemoglobin 10/16/2019    Mumps     Obesity     PAD (peripheral artery disease) (HCC) 4/27/2017    Painful joint     Post herpetic neuralgia 8/22/2019    Reactive depression 4/27/2017    Rhinitis     Shingles outbreak 06/2019    Sore muscles     Swelling of lower extremity     Tobacco abuse 4/27/2017    Tonsillitis     Type 2 diabetes mellitus with ophthalmic complication ([facility]) 3/22/2018    Valvular heart disease     Wears glasses     Weight loss     Wheezing        Past Surgical History:   Procedure Laterality Date    NJ COLONOSCOPY,DIAGNOSTIC N/A 7/17/2021    Procedure: COLONOSCOPY;  Surgeon: Oh Ochoa M.D.;  Location: SURGERY [facility];  Service: Gastroenterology    NJ COLONOSCPY,FLEX,W/DIR SUBMUC INJECT N/A 7/17/2021    Procedure: COLONOSCOPY, WITH SCLEROTHERAPY;  Surgeon: Oh Ochoa M.D.;  Location: SURGERY [facility];  Service: Gastroenterology    NJ COLONOSCOPY,FLEX,W/CONTROL, BLEEDING N/A 7/17/2021    Procedure: COLONOSCOPY, WITH CLIP APPLICATION;  Surgeon: Oh Ochoa M.D.;  Location: SURGERY [facility];  Service: Gastroenterology    COLONOSCOPY WITH APC N/A 7/17/2021    Procedure: COLONOSCOPY, WITH ARGON PLASMA COAGULATION;  Surgeon: Oh Ochoa M.D.;  Location: SURGERY [facility];  Service: Gastroenterology    NJ UPPER GI ENDOSCOPY,DIAGNOSIS N/A 7/16/2021    Procedure: GASTROSCOPY;  Surgeon: Vickey Mane M.D.;  Location: SURGERY SAME DAY [facility];  Service: Gastroenterology    NJ UPPER GI  ENDOSCOPY,BIOPSY N/A 7/16/2021    Procedure: GASTROSCOPY, WITH BIOPSY;  Surgeon: Vickey Mane M.D.;  Location: SURGERY SAME DAY [facility];  Service: Gastroenterology    STENT PLACEMENT  2007    cardiac stent    HYSTERECTOMY LAPAROSCOPY  1988    TUBAL LIGATION  1983    APPENDECTOMY      TONSILLECTOMY      VEIN STRIPPING         Medications Prior to Admission   Medication Sig Dispense Refill Last Dose/Taking    LANTUS SOLOSTAR 100 UNIT/ML Solution Pen-injector injection Inject 10 Units under the skin every morning.   3/20/2025 Morning    Melatonin 10 MG Tab Take 10 mg by mouth at bedtime as needed.   3/19/2025 Bedtime    ascorbic acid (VITAMIN C) 500 MG tablet Take 500 mg by mouth every day.   3/20/2025 Morning    furosemide (LASIX) 20 MG Tab Take 1 Tablet by mouth every day. Hold for sbp less 105 30 Tablet 0 3/20/2025 Morning    potassium chloride SA (K-DUR) 10 MEQ Tab CR Take 1 Tablet by mouth every day. 30 Tablet 0 3/20/2025 Morning    metFORMIN (GLUCOPHAGE) 850 MG Tab Take 850 mg by mouth 3 times a day with meals.   3/20/2025 Morning    benazepril (LOTENSIN) 20 MG Tab Take 20 mg by mouth every day.   3/20/2025 Morning    CALCIUM PO Take 1 Tablet by mouth every day.   3/20/2025 Morning    Coenzyme Q10 (CO Q 10 PO) Take 1 Tablet by mouth every day.   3/20/2025 Morning    aspirin (ASA) 81 MG Chew Tab chewable tablet Chew 1 tablet every day. 100 tablet 0 3/20/2025 Morning    pioglitazone (ACTOS) 45 MG Tab Take 45 mg by mouth every morning.   3/20/2025 Morning    simvastatin (ZOCOR) 20 MG Tab Take 1 Tab by mouth every evening. 30 Tab 8 3/19/2025 Evening    metoprolol (LOPRESSOR) 25 MG Tab Take 1 Tab by mouth 2 times a day. 60 Tab 8 3/20/2025 Morning    FLUoxetine (PROZAC) 20 MG Cap Take 1 Cap by mouth every day. 30 Cap 8 3/20/2025 Morning    acetaminophen (TYLENOL) 500 MG Tab Take 500-1,000 mg by mouth every 6 hours as needed for Mild Pain.   Unknown    dapagliflozin propanediol (FARXIGA) 5 MG Tab Take 5 mg by mouth  every day.   Unknown    nitroglycerin (NITROSTAT) 0.4 MG SL Tab Place 1 Tab under tongue as needed for Chest Pain. 25 Tab 1     NON SPECIFIED Portable Oxygen  Dx: COPD 1 Each 11      Current Facility-Administered Medications   Medication Dose Route Frequency Provider Last Rate Last Admin    Respiratory Therapy Consult   Nebulization Continuous RT Elian Oconnor M.D.        [START ON 3/22/2025] enoxaparin (Lovenox) inj 40 mg  40 mg Subcutaneous DAILY AT 1800 Elian Oconnor M.D.        acetaminophen (Tylenol) tablet 650 mg  650 mg Oral Q6HRS PRN Elian Oconnor M.D.   650 mg at 03/21/25 1419    senna-docusate (Pericolace Or Senokot S) 8.6-50 MG per tablet 2 Tablet  2 Tablet Oral Q EVENING Elian Oconnor M.D.        And    polyethylene glycol/lytes (Miralax) Packet 1 Packet  1 Packet Oral QDAY PRN Elian Oconnor M.D.        norepinephrine (Levophed) 8 mg in 250 mL NS infusion (premix)  0-1 mcg/kg/min (Ideal) Intravenous Continuous Elian Oconnor M.D. 5.6 mL/hr at 03/21/25 1445 0.05 mcg/kg/min at 03/21/25 1445    aspirin (Asa) chewable tab 81 mg  81 mg Oral DAILY Naveen Stewart M.D.   81 mg at 03/21/25 1558    [START ON 3/22/2025] dapagliflozin propanediol (Farxiga) tablet 10 mg  10 mg Oral DAILY Naveen Stewart M.D.        FLUoxetine (PROzac) capsule 20 mg  20 mg Oral DAILY Naveen Stewart M.D.   20 mg at 03/21/25 1558    simvastatin (Zocor) tablet 20 mg  20 mg Oral Q EVENING Naveen Stewart M.D.        fluticasone-umeclidinium-vilanterol (Trelegy Ellipta) 200-62.5-25 mcg/act inhaler 1 Puff  1 Puff Inhalation QDAILY (RT) Naveen Stewart M.D.        ipratropium-albuterol (DUONEB) nebulizer solution  3 mL Nebulization Q4H PRN (RT) Naveen Stewart M.D.        insulin regular (HumuLIN R/NovoLIN R) 100 Units in  mL infusion premix  0-29 Units/hr Intravenous Continuous Naveen Stewart M.D. 3.5 mL/hr at 03/21/25 1544 3.5 Units/hr at 03/21/25 1544    dextrose 50 %  (D50W) injection 12.5-25 g  12.5-25 g Intravenous PRN Naveen Stewart M.D.        furosemide (Lasix) injection 80 mg  80 mg Intravenous BID DIURETIC Naveen Stewart M.D.        insulin regular (HumuLIN R/NovoLIN R) 1 unit/mL syringe (IV ONLY) 3 Units  3 Units Intravenous Once Naveen Stewart M.D.           Allergies   Allergen Reactions    Aldactone [Kdc:Yellow Dye+Spironolactone] Palpitations    Percocet [Apap-Fd&C Red #40 [place]-Oxycodone] Rash     Full body  Patient states she can take if she also takes Benadryl  Tolerated acetaminophen 2020       Family History   Problem Relation Age of Onset    Cancer Mother         rectal    Arthritis Father     Alcohol/Drug Father     Heart Disease Father     Diabetes Brother     Cancer Maternal Aunt         colon       Social History     Socioeconomic History    Marital status:      Spouse name: Not on file    Number of children: Not on file    Years of education: Not on file    Highest education level: Not on file   Occupational History    Not on file   Tobacco Use    Smoking status: Former     Current packs/day: 0.25     Average packs/day: 0.3 packs/day for 60.0 years (15.0 ttl pk-yrs)     Types: Cigarettes    Smokeless tobacco: Never   Vaping Use    Vaping status: Never Used   Substance and Sexual Activity    Alcohol use: No    Drug use: No    Sexual activity: Never   Other Topics Concern    Not on file   Social History Narrative    Not on file     Social Drivers of Health     Financial Resource Strain: Not on file   Food Insecurity: No Food Insecurity (3/21/2025)    Hunger Vital Sign     Worried About Running Out of Food in the Last Year: Never true     Ran Out of Food in the Last Year: Never true   Transportation Needs: No Transportation Needs (3/21/2025)    PRAPARE - Transportation     Lack of Transportation (Medical): No     Lack of Transportation (Non-Medical): No   Physical Activity: Not on file   Stress: Not on file   Social Connections: Not on  file   Intimate Partner Violence: Not At Risk (3/21/2025)    Humiliation, Afraid, Rape, and Kick questionnaire     Fear of Current or Ex-Partner: No     Emotionally Abused: No     Physically Abused: No     Sexually Abused: No   Housing Stability: Low Risk  (3/21/2025)    Housing Stability Vital Sign     Unable to Pay for Housing in the Last Year: No     Number of Times Moved in the Last Year: 0     Homeless in the Last Year: No         Intake/Output Summary (Last 24 hours) at 3/21/2025 1615  Last data filed at 3/21/2025 1544  Gross per 24 hour   Intake 5.45 ml   Output 700 ml   Net -694.55 ml         Review of Systems   Constitutional: Negative for diaphoresis and fever.   Respiratory:  Negative for cough, hemoptysis and wheezing.    Gastrointestinal:  Negative for hematochezia and melena.   Genitourinary:  Negative for hematuria.        Labs (personally reviewed and notable for):   Lab Results   Component Value Date/Time    SODIUM 132 (L) 03/21/2025 01:45 PM    POTASSIUM 5.1 03/21/2025 01:45 PM    CHLORIDE 95 (L) 03/21/2025 01:45 PM    CO2 17 (L) 03/21/2025 01:45 PM    GLUCOSE 410 (H) 03/21/2025 01:45 PM    BUN 33 (H) 03/21/2025 01:45 PM    CREATININE 0.79 03/21/2025 01:45 PM    BUNCREATRAT 34 (H) 12/03/2019 01:48 PM      Lab Results   Component Value Date/Time    WBC 16.8 (H) 03/21/2025 01:45 PM    RBC 4.24 03/21/2025 01:45 PM    HEMOGLOBIN 13.1 03/21/2025 01:45 PM    HEMATOCRIT 42.5 03/21/2025 01:45 PM    .2 (H) 03/21/2025 01:45 PM    MCH 30.9 03/21/2025 01:45 PM    MCHC 30.8 (L) 03/21/2025 01:45 PM    MPV 10.2 03/21/2025 01:45 PM    NEUTSPOLYS 86.30 (H) 03/21/2025 01:45 PM    LYMPHOCYTES 4.60 (L) 03/21/2025 01:45 PM    MONOCYTES 7.60 03/21/2025 01:45 PM    EOSINOPHILS 0.60 03/21/2025 01:45 PM    BASOPHILS 0.20 03/21/2025 01:45 PM    ANISOCYTOSIS 1+ 07/15/2021 06:10 PM    INR 1.05 07/15/2021 08:50 PM      Lab Results   Component Value Date/Time    CHOLSTRLTOT 173 03/21/2025 01:45 PM    LDL 58 03/21/2025  01:45 PM     03/21/2025 01:45 PM    TRIGLYCERIDE 44 03/21/2025 01:45 PM       Lab Results   Component Value Date/Time    TROPONINT 219 (H) 03/21/2025 1345     Lab Results   Component Value Date/Time    NTPROBNP 43900 (H) 03/21/2025 1345     Lab Results   Component Value Date/Time    HBA1C 12.0 (H) 03/21/2025 1345     Thank you for allowing me to participate in the care of this patient.  Please contact me with any questions.    Matilda House M.D.  Cardiologist, [facility] [place]     This note was generated using voice recognition software which has a small chance of producing errors of grammar and possibly content. I have made every reasonable attempt to find and correct any obvious errors, but expect that some may not be found prior to finalization of this note.

## 2025-03-22 NOTE — HOSPITAL COURSE
"\"This is an 78 yo F w/ HFpEF, PH, COPD w/ chronic hypoxic/hypercapneic RF on 2L NC, IDDM, and HTN who presented to [facility] on 3/20 after a mechanical fall and developed sudden onset shortness of breath. Xrays of her ankles/knees were negative for acute fracture. 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 CTA chest negative for PE and showed pulmonary edema with small R pleural effusion on background of emphysema. Labs showed mild troponin elevation, hyperglycemia, normal procal, negative flu/covid and elevated BNP. ECG shows no overt ischemic changes. Patient herself notes 10 lbs weight gain and leg swelling over the last week. She denies cough, fever, chills, sputum production. She received empiric treatment for PNA/AECOPD w/ vanc/zosyn/solumedrol. On arrival to our ICU she was able to be weaned from BiPAP to 4L NC. She arrived on low-dose levophed, which has been nearly weaned off. She was described as full code on transfer, but she has stated her wish is to be DNR/DNI. We will continue diuresis, trend troponins, consult cardiology and start insulin gtt for hyperglycemia. Will hold on further abx/steroids for now.\" Dr Oconnor     "

## 2025-03-23 ENCOUNTER — APPOINTMENT (OUTPATIENT)
Dept: CARDIOLOGY | Facility: MEDICAL CENTER | Age: 80
DRG: 321 | End: 2025-03-23
Attending: INTERNAL MEDICINE
Payer: MEDICARE

## 2025-03-23 LAB
ANION GAP SERPL CALC-SCNC: 7 MMOL/L (ref 7–16)
BUN SERPL-MCNC: 40 MG/DL (ref 8–22)
CALCIUM SERPL-MCNC: 8.8 MG/DL (ref 8.5–10.5)
CHLORIDE SERPL-SCNC: 93 MMOL/L (ref 96–112)
CO2 SERPL-SCNC: 29 MMOL/L (ref 20–33)
CREAT SERPL-MCNC: 0.79 MG/DL (ref 0.5–1.4)
ERYTHROCYTE [DISTWIDTH] IN BLOOD BY AUTOMATED COUNT: 55.3 FL (ref 35.9–50)
GFR SERPLBLD CREATININE-BSD FMLA CKD-EPI: 75 ML/MIN/1.73 M 2
GLUCOSE BLD STRIP.AUTO-MCNC: 275 MG/DL (ref 65–99)
GLUCOSE BLD STRIP.AUTO-MCNC: 280 MG/DL (ref 65–99)
GLUCOSE BLD STRIP.AUTO-MCNC: 351 MG/DL (ref 65–99)
GLUCOSE BLD STRIP.AUTO-MCNC: 441 MG/DL (ref 65–99)
GLUCOSE SERPL-MCNC: 323 MG/DL (ref 65–99)
HCT VFR BLD AUTO: 35.2 % (ref 37–47)
HGB BLD-MCNC: 11.2 G/DL (ref 12–16)
LV EJECT FRACT  99904: 40
LV EJECT FRACT MOD 2C 99903: 49.44
LV EJECT FRACT MOD 4C 99902: 53.68
LV EJECT FRACT MOD BP 99901: 57.39
MCH RBC QN AUTO: 30.3 PG (ref 27–33)
MCHC RBC AUTO-ENTMCNC: 31.8 G/DL (ref 32.2–35.5)
MCV RBC AUTO: 95.1 FL (ref 81.4–97.8)
PLATELET # BLD AUTO: 200 K/UL (ref 164–446)
PMV BLD AUTO: 9.9 FL (ref 9–12.9)
POTASSIUM SERPL-SCNC: 4.4 MMOL/L (ref 3.6–5.5)
RBC # BLD AUTO: 3.7 M/UL (ref 4.2–5.4)
SODIUM SERPL-SCNC: 129 MMOL/L (ref 135–145)
UFH PPP CHRO-ACNC: 0.63 IU/ML
WBC # BLD AUTO: 7.8 K/UL (ref 4.8–10.8)

## 2025-03-23 PROCEDURE — 700102 HCHG RX REV CODE 250 W/ 637 OVERRIDE(OP): Performed by: STUDENT IN AN ORGANIZED HEALTH CARE EDUCATION/TRAINING PROGRAM

## 2025-03-23 PROCEDURE — 80048 BASIC METABOLIC PNL TOTAL CA: CPT

## 2025-03-23 PROCEDURE — 700111 HCHG RX REV CODE 636 W/ 250 OVERRIDE (IP): Mod: JZ | Performed by: HOSPITALIST

## 2025-03-23 PROCEDURE — 94664 DEMO&/EVAL PT USE INHALER: CPT

## 2025-03-23 PROCEDURE — 700102 HCHG RX REV CODE 250 W/ 637 OVERRIDE(OP): Performed by: INTERNAL MEDICINE

## 2025-03-23 PROCEDURE — 93306 TTE W/DOPPLER COMPLETE: CPT | Mod: 26 | Performed by: INTERNAL MEDICINE

## 2025-03-23 PROCEDURE — 770020 HCHG ROOM/CARE - TELE (206)

## 2025-03-23 PROCEDURE — 82962 GLUCOSE BLOOD TEST: CPT | Mod: 91

## 2025-03-23 PROCEDURE — 99232 SBSQ HOSP IP/OBS MODERATE 35: CPT | Performed by: INTERNAL MEDICINE

## 2025-03-23 PROCEDURE — 93306 TTE W/DOPPLER COMPLETE: CPT

## 2025-03-23 PROCEDURE — A9270 NON-COVERED ITEM OR SERVICE: HCPCS | Performed by: HOSPITALIST

## 2025-03-23 PROCEDURE — A9270 NON-COVERED ITEM OR SERVICE: HCPCS | Performed by: INTERNAL MEDICINE

## 2025-03-23 PROCEDURE — 99233 SBSQ HOSP IP/OBS HIGH 50: CPT | Performed by: HOSPITALIST

## 2025-03-23 PROCEDURE — 85520 HEPARIN ASSAY: CPT

## 2025-03-23 PROCEDURE — 700102 HCHG RX REV CODE 250 W/ 637 OVERRIDE(OP)

## 2025-03-23 PROCEDURE — 700101 HCHG RX REV CODE 250

## 2025-03-23 PROCEDURE — A9270 NON-COVERED ITEM OR SERVICE: HCPCS | Performed by: STUDENT IN AN ORGANIZED HEALTH CARE EDUCATION/TRAINING PROGRAM

## 2025-03-23 PROCEDURE — 700102 HCHG RX REV CODE 250 W/ 637 OVERRIDE(OP): Performed by: HOSPITALIST

## 2025-03-23 PROCEDURE — A9270 NON-COVERED ITEM OR SERVICE: HCPCS

## 2025-03-23 PROCEDURE — 85027 COMPLETE CBC AUTOMATED: CPT

## 2025-03-23 RX ORDER — AMIODARONE HYDROCHLORIDE 200 MG/1
200 TABLET ORAL TWICE DAILY
Status: DISCONTINUED | OUTPATIENT
Start: 2025-03-23 | End: 2025-03-26

## 2025-03-23 RX ORDER — AMIODARONE HYDROCHLORIDE 200 MG/1
200 TABLET ORAL DAILY
Status: DISCONTINUED | OUTPATIENT
Start: 2025-03-23 | End: 2025-03-23

## 2025-03-23 RX ORDER — BISOPROLOL FUMARATE 5 MG/1
2.5 TABLET, FILM COATED ORAL
Status: DISCONTINUED | OUTPATIENT
Start: 2025-03-23 | End: 2025-03-26 | Stop reason: HOSPADM

## 2025-03-23 RX ADMIN — INSULIN LISPRO 9 UNITS: 100 INJECTION, SOLUTION INTRAVENOUS; SUBCUTANEOUS at 12:21

## 2025-03-23 RX ADMIN — CLOPIDOGREL BISULFATE 75 MG: 75 TABLET, FILM COATED ORAL at 05:08

## 2025-03-23 RX ADMIN — SENNOSIDES AND DOCUSATE SODIUM 2 TABLET: 50; 8.6 TABLET ORAL at 17:23

## 2025-03-23 RX ADMIN — ACETAMINOPHEN 650 MG: 325 TABLET ORAL at 23:38

## 2025-03-23 RX ADMIN — APIXABAN 5 MG: 5 TABLET, FILM COATED ORAL at 09:25

## 2025-03-23 RX ADMIN — AMIODARONE HYDROCHLORIDE 0.5 MG/MIN: 1.8 INJECTION, SOLUTION INTRAVENOUS at 03:42

## 2025-03-23 RX ADMIN — APIXABAN 5 MG: 5 TABLET, FILM COATED ORAL at 17:24

## 2025-03-23 RX ADMIN — FLUOXETINE HYDROCHLORIDE 20 MG: 20 CAPSULE ORAL at 05:09

## 2025-03-23 RX ADMIN — AMIODARONE HYDROCHLORIDE 200 MG: 200 TABLET ORAL at 09:25

## 2025-03-23 RX ADMIN — ASPIRIN 81 MG: 81 TABLET, CHEWABLE ORAL at 05:09

## 2025-03-23 RX ADMIN — GABAPENTIN 100 MG: 100 CAPSULE ORAL at 05:09

## 2025-03-23 RX ADMIN — ACETAMINOPHEN 650 MG: 325 TABLET ORAL at 12:18

## 2025-03-23 RX ADMIN — ACETAMINOPHEN 650 MG: 325 TABLET ORAL at 05:09

## 2025-03-23 RX ADMIN — GABAPENTIN 100 MG: 100 CAPSULE ORAL at 17:23

## 2025-03-23 RX ADMIN — INSULIN LISPRO 8 UNITS: 100 INJECTION, SOLUTION INTRAVENOUS; SUBCUTANEOUS at 17:10

## 2025-03-23 RX ADMIN — HYDROXYZINE HYDROCHLORIDE 10 MG: 10 TABLET ORAL at 23:38

## 2025-03-23 RX ADMIN — INSULIN LISPRO 5 UNITS: 100 INJECTION, SOLUTION INTRAVENOUS; SUBCUTANEOUS at 08:19

## 2025-03-23 RX ADMIN — AMIODARONE HYDROCHLORIDE 200 MG: 200 TABLET ORAL at 17:24

## 2025-03-23 RX ADMIN — DAPAGLIFLOZIN 10 MG: 10 TABLET, FILM COATED ORAL at 05:09

## 2025-03-23 RX ADMIN — GABAPENTIN 100 MG: 100 CAPSULE ORAL at 12:20

## 2025-03-23 RX ADMIN — OXYCODONE 5 MG: 5 TABLET ORAL at 00:56

## 2025-03-23 RX ADMIN — LIDOCAINE 1 PATCH: 4 PATCH TOPICAL at 17:22

## 2025-03-23 RX ADMIN — BISOPROLOL FUMARATE 2.5 MG: 5 TABLET ORAL at 12:19

## 2025-03-23 RX ADMIN — FLUTICASONE FUROATE, UMECLIDINIUM BROMIDE AND VILANTEROL TRIFENATATE 1 PUFF: 200; 62.5; 25 POWDER RESPIRATORY (INHALATION) at 08:15

## 2025-03-23 RX ADMIN — ACETAMINOPHEN 650 MG: 325 TABLET ORAL at 17:22

## 2025-03-23 RX ADMIN — INSULIN LISPRO 5 UNITS: 100 INJECTION, SOLUTION INTRAVENOUS; SUBCUTANEOUS at 21:26

## 2025-03-23 RX ADMIN — ATORVASTATIN CALCIUM 40 MG: 40 TABLET, FILM COATED ORAL at 17:23

## 2025-03-23 RX ADMIN — ACETAMINOPHEN 650 MG: 325 TABLET ORAL at 00:56

## 2025-03-23 ASSESSMENT — ENCOUNTER SYMPTOMS
ROS GI COMMENTS: TOLERATING A DIET
SHORTNESS OF BREATH: 0

## 2025-03-23 ASSESSMENT — CHA2DS2 SCORE
VASCULAR DISEASE: YES
AGE 65 TO 74: NO
SEX: FEMALE
HYPERTENSION: YES
PRIOR STROKE OR TIA OR THROMBOEMBOLISM: NO
DIABETES: YES
AGE 75 OR GREATER: YES
CHF OR LEFT VENTRICULAR DYSFUNCTION: YES
CHA2DS2 VASC SCORE: 7

## 2025-03-23 ASSESSMENT — FIBROSIS 4 INDEX: FIB4 SCORE: 3.55

## 2025-03-23 ASSESSMENT — PAIN DESCRIPTION - PAIN TYPE
TYPE: ACUTE PAIN

## 2025-03-23 NOTE — CARE PLAN
The patient is Watcher - Medium risk of patient condition declining or worsening    Shift Goals  Clinical Goals: cath lab; tele monitoring  Patient Goals: cath lab  Family Goals: up to date on poc    Progress made toward(s) clinical / shift goals:    Problem: Knowledge Deficit - Standard  Goal: Patient and family/care givers will demonstrate understanding of plan of care, disease process/condition, diagnostic tests and medications  Outcome: Progressing     Problem: Pain - Standard  Goal: Alleviation of pain or a reduction in pain to the patient’s comfort goal  Outcome: Progressing     Problem: Fall Risk  Goal: Patient will remain free from falls  Outcome: Progressing     Problem: Skin Integrity  Goal: Skin integrity is maintained or improved  Outcome: Progressing       Patient is not progressing towards the following goals:

## 2025-03-23 NOTE — PROGRESS NOTES
[id number]  Pt returned from cath lab. Right TR band in place. Right hand warm, pt denies numbness/tingling, 2+ radial right pulse, <3 sec cap refill, pulse ox on right hand with good waveform. Tele monitor and EKG shows pt in afib. -150s. Blood pressure soft 90s/50s on right arm. BP on left arm 78/58s.    Dr. Downey at bedside and Dr. Downey discussed with cards via phone. Pt denies sob. Pt states chest pain but unchanged from prior to cath lab. Pt denies palpitations. Amio drip bolus given, metoprolol oral given and heparin restarted.       1845  Pt converted to SR at 1758. Blood pressure soft 90s/50s and heart rate 60-70s now. Dr. Downey notified of vitals and rhythm change. TR band removed at 1845. Site check with NOC RN. Site with mild ecchymosis, no edema, no drainage, see circ flowsheet. Gauze and tegaderm applied.

## 2025-03-23 NOTE — PROGRESS NOTES
Hospital Medicine Daily Progress Note    Date of Service  3/23/2025    Chief Complaint  Kimberly Castañeda is a 79 y.o. female admitted 3/21/2025 with chest pain    Hospital Course  Ms. Castañeda has a proximal history of coronary artery disease with prior stent and as well as normal ejection fraction by echocardiogram 2022, oxygen dependent COPD, that presented to the emergency room in [facility] with shortness of breath.  Echocardiogram there revealed apical hypokinesis suggestive of possible stress cardiomyopathy and ejection fraction of 30%.  Due to cardiogenic shock she required IV Levophed drip.  Prior to transfer she was given broad-spectrum antibiotics as well as Solu-Medrol for possible pneumonia and COPD exacerbation.  She was transferred here for direct admit to the intensive care unit with cardiology consultation.  Troponins were in the low 200s as she has been treated with IV heparin drip.  She was tapered off Levophed drip.  On 3/22/2025 she went to the cardiac Cath Lab with stent to the circumflex x 2 and first marginal branch.  After returning from the Cath Lab she went into new onset atrial fibrillation with rapid ventricular sponsor rate in the 120s.    Interval Problem Update  3/23: Ms. Castañeda was evaluated in the IMCU. She has been on an IV amiodarone drip overnight as well as an IV heparin drip due to new onset afib with rapid ventricular response. She is now in sinus rhythm. She is on dual antiplatelet therapy given her stents. Hb is 11.2. Glucose is 323. Increase lantus (5 units had been given last night and 10 units this morning). Her son and daughter in law are at bedside.    I have discussed this patient's plan of care and discharge plan at IDT rounds today with Case Management, Nursing, Nursing leadership, and other members of the IDT team.    Consultants/Specialty  Cardiology. I discussed in person with Dr. House    Code Status  DNAR/DNI    Disposition  The patient is not medically  cleared for discharge to home or a post-acute facility.      I have placed the appropriate orders for post-discharge needs.    Review of Systems  Review of Systems   Respiratory:  Negative for shortness of breath.    Cardiovascular:  Negative for chest pain.   Gastrointestinal:         Tolerating a diet        Physical Exam  Temp:  [36.7 °C (98 °F)-37.1 °C (98.8 °F)] 36.9 °C (98.4 °F)  Pulse:  [] 54  Resp:  [13-40] 18  BP: ()/(41-73) 104/52  SpO2:  [92 %-99 %] 98 %    Physical Exam  Constitutional:       Appearance: She is ill-appearing.   Cardiovascular:      Rate and Rhythm: Normal rate and regular rhythm.   Pulmonary:      Effort: Pulmonary effort is normal.      Breath sounds: Normal breath sounds.   Abdominal:      General: There is no distension.      Tenderness: There is no abdominal tenderness.   Musculoskeletal:      Right lower leg: No edema.      Left lower leg: No edema.   Neurological:      Mental Status: She is alert and oriented to person, place, and time.         Fluids    Intake/Output Summary (Last 24 hours) at 3/23/2025 0714  Last data filed at 3/22/2025 1914  Gross per 24 hour   Intake 818.33 ml   Output 500 ml   Net 318.33 ml        Laboratory  Recent Labs     03/21/25  1345 03/22/25  0310 03/23/25  0500   WBC 16.8* 14.1* 7.8   RBC 4.24 3.82* 3.70*   HEMOGLOBIN 13.1 11.5* 11.2*   HEMATOCRIT 42.5 38.3 35.2*   .2* 100.3* 95.1   MCH 30.9 30.1 30.3   MCHC 30.8* 30.0* 31.8*   RDW 57.9* 57.8* 55.3*   PLATELETCT 260 188 200   MPV 10.2 9.9 9.9     Recent Labs     03/21/25  1740 03/22/25  0310 03/23/25  0500   SODIUM 134* 132* 129*   POTASSIUM 5.3 4.8 4.4   CHLORIDE 95* 97 93*   CO2 22 24 29   GLUCOSE 281* 128* 323*   BUN 32* 34* 40*   CREATININE 0.79 0.68 0.79   CALCIUM 10.0 9.2 8.8     Recent Labs     03/21/25  1740   APTT 26.3   INR 0.98         Recent Labs     03/21/25  1345   TRIGLYCERIDE 44      LDL 58       Imaging  EC-ECHOCARDIOGRAM COMPLETE W/O CONT    (Results Pending)    CL-LEFT HEART CATHETERIZATION WITH POSSIBLE INTERVENTION    (Results Pending)        Assessment/Plan  * Acute HFrEF (heart failure with reduced ejection fraction) ([facility])- (present on admission)  Assessment & Plan  EF 30% by echo and heart cath  Ischemic  S/p stents x 3  Goal directed medical therapy once her blood pressure improves    Acute coronary artery obstruction without MI ([facility])- (present on admission)  Assessment & Plan  With stenting x 3 on 3/22/2025  Eliquis and plavix for antiplatelet therapy  Start very low-dose beta-blocker  Continuous telemetry monitoring  Increase Zocor to high intensity statin Lipitor.  LDL 58    Atrial fibrillation with rapid ventricular response ([facility])- (present on admission)  Assessment & Plan  New onset post heart cath  Rate 120's  IV heparin drip will transition to Eliquis  IV amiodarone bolus and drip transition to oral  Low dose bisoprolol for rate control (she was recently in shock thus low dose).    Shock ([facility])- (present on admission)  Assessment & Plan  Cardiogenic shock  S/p pressors      Hyponatremia- (present on admission)  Assessment & Plan  Hypervolemic  Follow up BMP in the morning after diuretics    Acute on chronic respiratory failure with hypoxia ([facility])- (present on admission)  Assessment & Plan  Due to volume overload  Supplemental oxygen  Further diuresis    Type 2 diabetes mellitus with ophthalmic complication ([facility])- (present on admission)  Assessment & Plan  With hyperglycemia  Add sliding scale  HbA1c quite elevated at 12  Restart lantus 10 units daily and consider increasing the dose accordingly    Essential hypertension- (present on admission)  Assessment & Plan  BP is now low          VTE prophylaxis:    therapeutic anticoagulation with eliquis 5 mg BID      I have performed a physical exam and reviewed and updated ROS and Plan today (3/23/2025). In review of yesterday's note (3/22/2025), there are no changes except as documented above.

## 2025-03-23 NOTE — DISCHARGE INSTR - DIET
Heart-Healthy Nutrition Therapy    A heart-healthy diet is recommended to reduce your unhealthy blood cholesterol levels, manage high blood pressure, and lower your risk for heart disease.    To follow a heart-healthy diet,    Eat a balanced diet with whole grains, fruits and vegetables, and lean protein sources.  Achieve and maintain a healthy weight.  Choose heart-healthy unsaturated fats. Limit saturated fats, trans fats, and cholesterol intake. Eat more plant-based or vegetarian meals using beans and soy foods for protein.  Eat whole, unprocessed foods to limit the amount of sodium (salt) you eat.  Limit refined carbohydrates especially sugar, sweets and sugar-sweetened beverages.  If you drink alcohol, do so in moderation: one serving per day (women) and two servings per day (men).  One serving is equivalent to 12 ounces beer, 5 ounces wine, or 1.5 ounces distilled spirits    Tips for Choosing Heart-Healthy Fats    Choose lean protein and low-fat dairy foods to reduce saturated fat intake.    Saturated fat is usually found in animal-based protein and is associated with certain health risks. Saturated fat is the biggest contributor to raised low-density lipoprotein (LDL) cholesterol levels in the diet. Research shows that limiting saturated fat lowers unhealthy cholesterol levels. Eat no more than 5-6% of your total calories each day from saturated fat. Ask your registered dietitian nutritionist (RDN) to help you determine how much saturated fat is right for you.  There are many foods that do not contain large amounts of saturated fats. Swapping these foods to replace foods high in saturated fats will help you limit the saturated fat you eat and improve your cholesterol levels. You can also try eating more plant-based or vegetarian meals.        Avoid trans fats    Trans fats increase levels of LDL-cholesterol. Hydrogenated fat in processed foods is the main source of trans fats in foods.   Trans fats can be  found in stick margarine, shortening, processed sweets, baked goods, some fried foods, and packaged foods made with hydrogenated oils. Avoid foods with “partially hydrogenated oil” on the ingredient list such as: cookies, pastries, baked goods, biscuits, crackers, microwave popcorn, and frozen dinners.    Choose foods with heart healthy fats    Polyunsaturated and monounsaturated fat are unsaturated fats that may help lower your blood cholesterol level when used in place of saturated fat in your diet.  Ask your RDN about taking a dietary supplement with plant sterols and stanols to help lower your cholesterol level.  Research shows that substituting saturated fats with unsaturated fats is beneficial to cholesterol levels. Try these easy swaps:      Limit the amount of cholesterol you eat to less than 200 milligrams per day.    Cholesterol is a substance carried through the bloodstream via lipoproteins, which are known as “transporters” of fat. Some body functions need cholesterol to work properly, but too much cholesterol in the bloodstream can damage arteries and build up blood vessel linings (which can lead to heart attack and stroke). You should eat less than 200 milligrams cholesterol per day.  People respond differently to eating cholesterol. There is no test available right now that can figure out which people will respond more to dietary cholesterol and which will respond less. For individuals with high intake of dietary cholesterol, different types of increase (none, small, moderate, large) in LDL-cholesterol levels are all possible.    Food sources of cholesterol include egg yolks and organ meats such as liver, gizzards.  Limit egg yolks to two to four per week and avoid organ meats like liver and gizzards to control cholesterol intake.    Tips for Choosing Heart-Healthy Carbohydrates    Consume foods rich in viscous (soluble) fiber    Viscous, or soluble, fiber is found in the walls of plant cells. Viscous  fiber is found only in plant-based foods--animal-based foods like meat or dairy products do not contain fiber. In the stomach, viscous fibers absorb water and swell to form a thick, jelly-like mass. This helps to lower your unhealthy cholesterol  Rich sources of viscous fiber include asparagus, [place] sprouts, sweet potatoes, turnips, apricots, mangoes, oranges, legumes, barley, oats, and oat bran.  Eat at least 5 to 10 grams of viscous fiber each day. As you increase your fiber intake gradually, also increase the amount of water you drink. This will help prevent constipation.  If you have difficulty achieving this goal, ask your RDN about fiber laxatives. Choose fiber supplements made with viscous fibers such as psyllium seed husks or methylcellulose to help lower unhealthy cholesterol.    Limit refined carbohydrates    There are three types of carbohydrates: starches, sugar, and fiber. Some carbohydrates occur naturally in food, like the starches in rice or corn or the sugars in fruits and milk. Refined carbohydrates--foods with high amounts of simple sugars--can raise triglyceride levels. High triglyceride levels are associated with coronary heart disease.  Some examples of refined carbohydrate foods are table sugar, sweets, and beverages sweetened with added sugar.    Tips for Reducing Sodium (Salt)  Although sodium is important for your body to function, too much sodium can be harmful for people with high blood pressure.  As sodium and fluid buildup in your tissues and bloodstream, your blood pressure increases. High blood pressure may cause damage to other organs and increase your risk for a stroke.    Keep your salt intake to 2300 milligrams or less per day. Even if you take a pill for blood pressure or a water pill (diuretic) to remove fluid, it is still important to have less salt in your diet. Ask your RDN what amount of sodium is right for you.    Avoid processed foods.  Eat more fresh foods.  Fresh  "fruits and vegetables are naturally low in sodium, as well as frozen vegetables and fruits that have no added juices or sauces.  Fresh meats are lower in sodium than processed meats, such as garcia, sausage, and hotdogs.  Read the nutrition label or ask your  to help you find a fresh meat that is low in sodium.  Eat less salt--at the table and when cooking.  A single teaspoon of table salt has 2,300 mg of sodium.  Leave the salt out of recipes for pasta, casseroles, and soups.  Ask your RDN how to cook your favorite recipes without sodium  Be a smart .  Look for food packages that say “salt-free” or “sodium-free.” These items contain less than 5 milligrams of sodium per serving.  “Very low-sodium” products contain less than 35 milligrams of sodium per serving.  “Low-sodium” products contain less than 140 milligrams of sodium per serving.   Beware of “reduced salt” or \"reduced sodium\" products.  These items may still be high in sodium. Check the nutrition label.   Add flavors to your food without adding sodium.  Try lemon juice, lime juice, fruit juice or vinegar.    Dry or fresh herbs add flavor. Try basil, bay leaf, dill, rosemary, parsley, chester, dry mustard, nutmeg, thyme, and paprika.  Pepper, red pepper flakes, and cayenne pepper can add spice to your meals without adding sodium. Hot sauce contains sodium, but if you use just a drop or two, it will not add up to much.  Buy a sodium-free seasoning blend or make your own at home.     Additional Lifestyle Tips    Achieve and maintain a healthy weight.  Talk with your RDN or your doctor about what is a healthy weight for you.  Set goals to reach and maintain that weight.   To lose weight, reduce your calorie intake along with increasing your physical activity. A weight loss of 10 to 15 pounds could reduce LDL-cholesterol by 5 milligrams per deciliter.  Participate in physical activity.    Talk with your health care team to find out what types of " physical activity are best for you. Set a plan to get about 30 minutes of exercise on most days.          Nutrition Counseling  Our expert team offers:   Medical Nutrition Therapy for Chronic Conditions   Weight Management   Diabetes Education and Management   Wellness Services   Body Composition Measurements   Gastrointestinal Health          Carbohydrate Counting for People With Diabetes  Foods with carbohydrates make your blood glucose level go up. Learning how to count carbohydrates can help you control your blood glucose levels. First, identify the foods you eat that contain carbohydrates. Then, using the Foods with Carbohydrates chart, determine about how much carbohydrates are in your meals and snacks. Make sure you are eating foods with fiber, protein, and healthy fat along with your carbohydrate foods.    Foods with Carbohydrates  The following table shows carbohydrate foods that have about 15 grams of carbohydrate each. Using measuring cups, spoons, or a food scale when you first begin learning about carbohydrate counting can help you learn about the portion sizes you typically eat.      The following foods have 15 grams carbohydrate each:    Grains  1 slice bread (1 ounce)  1 small tortilla (6-inch size)  ¼ large bagel (1 ounce)  1/3 cup pasta or rice (cooked)  ½ hamburger or hot dog bun (¾ ounce)  ½ cup cooked cereal  ½ to ¾ cup ready-to-eat cereal  2 taco shells (5-inch size)    Fruit  1 small fresh fruit (¾ to 1 cup)  ½ medium banana  17 small grapes (3 ounces)  1 cup melon or berries  ½ cup canned or frozen fruit  2 tablespoons dried fruit (blueberries, cherries, cranberries, raisins)  ½ cup unsweetened fruit juice    Starchy Vegetables  ½ cup cooked beans, peas, corn, potatoes/sweet potatoes  ¼ large baked potato (3 ounces)  1 cup acorn or butternut squash    Snack Foods  3 to 6 crackers  8 potato chips or 13 tortilla chips (¾ ounce to 1 ounce)  3 cups popped popcorn    Dairy  3/4 cup (6 ounces)  nonfat plain yogurt, or yogurt with sugar-free sweetener  1 cup milk  1 cup plain rice, soy, coconut or flavored almond milk    Sweets and Desserts  ½ cup ice cream or frozen yogurt  1 tablespoon jam, jelly, pancake syrup, table sugar, or honey  2 tablespoons light pancake syrup  1 inch square of frosted cake or 2 inch square of unfrosted cake  2 small cookies (2/3 ounce each) or ¼ large cookie    Sometimes you’ll have to estimate carbohydrate amounts if you don’t know the exact recipe. One cup of mixed foods like soups can have 1 to 2 carbohydrate servings, while some casseroles might have 2 or more servings of carbohydrate.    Foods that have less than 20 calories in each serving can be counted as “free” foods. Count 1 cup raw vegetables, or ½ cup cooked non-starchy vegetables as “free” foods. If you eat 3 or more servings at one meal, then count them as 1 carbohydrate serving.    Foods without Carbohydrates  Not all foods contain carbohydrates. Meat, some dairy, fats, non-starchy vegetables, and many beverages don’t contain carbohydrate. So when you count carbohydrates, you can generally exclude chicken, pork, beef, fish, seafood, eggs, tofu, cheese, butter, sour cream, avocado, nuts, seeds, olives, mayonnaise, water, black coffee, unsweetened tea, and zero-calorie drinks. Vegetables with no or low carbohydrate include green beans, cauliflower, tomatoes, and onions.    How much carbohydrate should I eat at each meal?  Carbohydrate counting can help you plan your meals and manage your weight. Following are some starting points for carbohydrate intake at each meal. Work with your registered dietitian nutritionist to find the best range that works for your blood glucose and weight.       Women  2 - 3 carb servings (To Lose Weight)  3 - 4 carb servings (To Maintain Weight)    Men  3 - 4 carb servings (To Lose Weight)  4 - 5 carb servings (To Maintain Weight)      Checking your blood glucose after meals will help you  know if you need to adjust the timing, type, or number of carbohydrate servings in your meal plan. Achieve and keep a healthy body weight by balancing your food intake and physical activity.         How should I plan my meals?  Plan for half the food on your plate to include non-starchy vegetables, like salad greens, broccoli, or carrots. Try to eat 3 to 5 servings of non-starchy vegetables every day. Have a protein food at each meal. Protein foods include chicken, fish, meat, eggs, or beans (note that beans contain carbohydrate). These two food groups (non-starchy vegetables and proteins) are low in carbohydrate. If you fill up your plate with these foods, you will eat less carbohydrate but still fill up your stomach. Try to limit your carbohydrate portion to ¼ of the plate.    What fats are healthiest to eat?  Diabetes increases risk for heart disease. To help protect your heart, eat more healthy fats, such as olive oil, nuts, and avocado. Eat less saturated fats like butter, cream, and high-fat meats, like garcia and sausage. Avoid trans fats, which are in all foods that list “partially hydrogenated oil” as an ingredient.      What should I drink?  Choose drinks that are not sweetened with sugar. The healthiest choices are water, carbonated or seltzer ogden, and tea and coffee without added sugars.    Sweet drinks will make your blood glucose go up very quickly. One serving of soda or energy drink is ½ cup. It is best to drink these beverages only if your blood glucose is low.    Artificially sweetened, or diet drinks, typically do not increase your blood glucose if they have zero calories in them. Read labels of beverages, as some diet drinks do have carbohydrate and will raise your blood glucose.    Label Reading Tips  Read Nutrition Facts labels to find out how many grams of carbohydrate are in a food you want to eat. Don’t forget: sometimes serving sizes on the label aren’t the same as how much food you are  going to eat, so you may need to calculate how much carbohydrate is in the food you are serving yourself.        The Nutrition Facts information is based on a standard serving size. However, that serving size may not be the same as the serving size used in carbohydrate counting.  Always start by checking the serving size on the label. Is this the serving size you will be eating? How many servings are in the package?  Next, look at the total carbohydrate. It is measured in grams (g). To find the number of carbohydrate servings in 1 standard serving of a food, divide the total grams of carbohydrate by 15. One (1) carbohydrate serving is the amount of food with 15 g carbohydrate.  You don’t need to count grams of sugars. They are included in the total carbohydrate.  The label shows how many calories are in the standard serving. It also lists the amount of fat, cholesterol, sodium, protein, and some vitamins and minerals. Talk to your registered dietitian nutritionist or diabetes educator to learn about your goals for these nutrients.  Look below the line listing total fat to find out how much of that fat is saturated fat or trans fat. Choose foods that are low in these kinds of fats because they are not healthy for your heart. In the foods that are healthiest for your heart, grams of saturated fat and trans fat are less than one-third of the total fat grams.  If foods are very low in calories (less than 20 calories per serving) or carbohydrates (5 g carbohydrate or less per serving), you may not need to count them when you count carbohydrates. Ask your registered dietitian nutritionist or diabetes educator about these “free” foods.        Nutrition Counseling  Our expert team offers:   Medical Nutrition Therapy for Chronic Conditions   Weight Management   Diabetes Education and Management   Wellness Services   Body Composition Measurements   Gastrointestinal Health    Nutrition Counseling Services are located at:  [id number]  [place], [place] [address]  For more information and to schedule a consultation, please call [phone].  A physician referral may be required by your insurance for coverage.

## 2025-03-23 NOTE — CARE PLAN
The patient is Watcher - Medium risk of patient condition declining or worsening    Shift Goals  Clinical Goals: monitor vital signs and increase mobility  Patient Goals: rest  Family Goals: updates    Progress made toward(s) clinical / shift goals:    Problem: Knowledge Deficit - Standard  Goal: Patient and family/care givers will demonstrate understanding of plan of care, disease process/condition, diagnostic tests and medications  Outcome: Progressing     Problem: Pain - Standard  Goal: Alleviation of pain or a reduction in pain to the patient’s comfort goal  Outcome: Progressing     Problem: Fall Risk  Goal: Patient will remain free from falls  Outcome: Progressing     Problem: Skin Integrity  Goal: Skin integrity is maintained or improved  Outcome: Progressing       Patient is not progressing towards the following goals:

## 2025-03-23 NOTE — PROGRESS NOTES
BP on left arm (60/41) significantly lower than right arm (92/50).  Access site right wrist. Notified Dr. Forrester, and okay to do BP on right arm and monitor access site for bleeding/hematoma.  Dressing currently CDI and site soft.

## 2025-03-23 NOTE — DIETARY
Nutrition Services: Cardiac Education Consult   Day 2 of admit.  Kimberly Castañeda is a 79 y.o. female with admitting DX of Acute HFrEF (heart failure with reduced ejection fraction)     RD received consult for heart healthy diet education. RD included nutrition recommendations and tips in dietary discharge instructions that align with pt's current dx. Outpatient resources included to encourage follow-up with [facility] for continued support after discharge.     No other education needs identified at this time. Please consult RD as needed for supplemental education or at request of pt.

## 2025-03-23 NOTE — PROGRESS NOTES
Cardiology Follow-up Consult Note    Date of Service:    3/23/2025      Consulting Physician: Murphy Downey M.D.    Name:   Kimberly Castañeda     YOB: 1945  Age:   79 y.o.  female   MRN:   [id number]      Assessment/Recommendations:    CAD trop flat 200s.  Cath with LCX obstructive disease 2 stents to LCX and 1 to OM1 3/23/25.  Still having chest pain so noncardiac.  Clopidogrel 75 mg po qd  No ASA as will have to start eliquis for a. Fib  Continue with statin  HFrEF, new diagnosis.  Closer to euvolemia.  BP is soft and may improve with PCI so will not start SLGTi, MRA or entresto  Try bisoprolol 2.5 mg po qd if HR and BP will tolerate, hold if not  Low dose losasrtan   Lasix stopped, might need prn as outpatient  [facility] is from [facility], can go to [place], will schedule cards fu in [place] in few weeks from discharge  A. Fib newly documented.  Started on IV amiodarone in sinus.  As bp is soft, will start oral, likely short term.  We addressed the management of antiarrhythmic medication at today's visit. She understands the risks and benefits of amiodarone therapy. We have specifically made sure that her anticoagulation is appropriate, QTc is appropriate and screened for arrhythmias.   I also stressed the importance of regular labs for thyroid and liver, regular lung exam and regular eye exam. She understands that this is a high risk medication and requires frequent monitoring as well as informing providers for any new medication to make sure it is appropriate for QTC prolongation.  Amiodarone 200 mg po bid while in hospital and dc on 200 mg po qd  Monitor as also started on bisoprolol   Eliquis started and ASA stopped  4.  BP arm differential, use Right arm.    Disposition:  1-2 days    Patient Identifier/Subjective:  79-year-old female with history of coronary artery disease with remote stenting in the past for which she is on aspirin, diabetes mellitus on insulin and Farxiga, hyperlipidemia, hypertension,  who was transferred from the outside hospital after presenting with a fall. Patient developed respiratory distress and newly noted decrease in LVEF and hypotension. Cardiology was consulted for decreased LVEF.     Interim History: Patient developed atrial fibrillation overnight and was started on amiodarone along with heparin drip.  Patient is also on aspirin and clopidogrel for stents placed on 3/22.  Patient still having chest pain so likely chest pain not cardiac.     Pertinent Labs/Studies:  ECG dated 3/22/2025 at 1850: Patient appears to be in atrial fibrillation rate of 129 bpm, anterior infarct age indeterminant, compared to prior ECG of 3/21/2025, atrial fibrillation appears to be new    Troponin flat 219, 231, 206  Sodium 129, creatinine 0.79, blood sugar 323  Cardiac catheterization 3/22/2025:  Left main calcified with eccentric stenosis, IVUS showed minimal luminal area 12 mm², nonsignificant stenosis     Left anterior descending artery has severe calcification, mild to moderate 30 to 50% stenosis, nonobstructive.     Left circumflex artery is severely calcified, large dominant vessel, eccentric calcified stenosis very hard to visualize by angiogram, 70% stenosis in the proximal portion by IVUS, large first marginal branch has 90% ostial stenosis.  Treated with 4.5 x 12, 4.5 x 20 drug-eluting stent.  OM branch treated with 3.5 x 12 drug-eluting stent   Distally gives large second marginal branch, large posterolateral branches.     Right coronary artery small nondominant vessel  Left ventriculogram showed ejection fraction 30% with global hypokinesis  LVEDP measured at 70 mmHg    Telemetry Reviewed (personally reviewed) sinus rhythm    All other review of systems reviewed and negative.    Past medical, surgical, social, and family history reviewed and unchanged from admission except as noted in assessment and plan.    Medications Prior to Admission   Medication Sig Dispense Refill Last Dose/Taking     LANTUS SOLOSTAR 100 UNIT/ML Solution Pen-injector injection Inject 10 Units under the skin every morning.   3/20/2025 Morning    Melatonin 10 MG Tab Take 10 mg by mouth at bedtime as needed.   3/19/2025 Bedtime    ascorbic acid (VITAMIN C) 500 MG tablet Take 500 mg by mouth every day.   3/20/2025 Morning    furosemide (LASIX) 20 MG Tab Take 1 Tablet by mouth every day. Hold for sbp less 105 30 Tablet 0 3/20/2025 Morning    potassium chloride SA (K-DUR) 10 MEQ Tab CR Take 1 Tablet by mouth every day. 30 Tablet 0 3/20/2025 Morning    metFORMIN (GLUCOPHAGE) 850 MG Tab Take 850 mg by mouth 3 times a day with meals.   3/20/2025 Morning    benazepril (LOTENSIN) 20 MG Tab Take 20 mg by mouth every day.   3/20/2025 Morning    CALCIUM PO Take 1 Tablet by mouth every day.   3/20/2025 Morning    Coenzyme Q10 (CO Q 10 PO) Take 1 Tablet by mouth every day.   3/20/2025 Morning    aspirin (ASA) 81 MG Chew Tab chewable tablet Chew 1 tablet every day. 100 tablet 0 3/20/2025 Morning    pioglitazone (ACTOS) 45 MG Tab Take 45 mg by mouth every morning.   3/20/2025 Morning    simvastatin (ZOCOR) 20 MG Tab Take 1 Tab by mouth every evening. 30 Tab 8 3/19/2025 Evening    metoprolol (LOPRESSOR) 25 MG Tab Take 1 Tab by mouth 2 times a day. 60 Tab 8 3/20/2025 Morning    FLUoxetine (PROZAC) 20 MG Cap Take 1 Cap by mouth every day. 30 Cap 8 3/20/2025 Morning    acetaminophen (TYLENOL) 500 MG Tab Take 500-1,000 mg by mouth every 6 hours as needed for Mild Pain.   Unknown    dapagliflozin propanediol (FARXIGA) 5 MG Tab Take 5 mg by mouth every day.   Unknown    nitroglycerin (NITROSTAT) 0.4 MG SL Tab Place 1 Tab under tongue as needed for Chest Pain. 25 Tab 1     NON SPECIFIED Portable Oxygen  Dx: COPD 1 Each 11        Inpatient Medications Reviewed    Allergies   Allergen Reactions    Aldactone [Kdc:Yellow Dye+Spironolactone] Palpitations    Percocet [Apap-Fd&C Red #40 [place]-Oxycodone] Rash     Full body  Patient states she can take if she  "also takes Benadryl  Tolerated acetaminophen 2020         Intake/Output Summary (Last 24 hours) at 3/23/2025 0730  Last data filed at 3/22/2025 1914  Gross per 24 hour   Intake 818.33 ml   Output 500 ml   Net 318.33 ml        Physical Exam  Body mass index is 23.63 kg/m².  /52   Pulse (!) 54   Temp 36.9 °C (98.4 °F) (Temporal)   Resp 18   Ht 1.676 m (5' 6\")   Wt 66.4 kg (146 lb 6.2 oz)   SpO2 98%   Vitals:    03/23/25 0400 03/23/25 0500 03/23/25 0510 03/23/25 0600   BP: 118/56 111/56 111/56 104/52   Pulse: 63 60 (!) 58 (!) 54   Resp: 20 18 15 18   Temp:       TempSrc:       SpO2: 94% 97% 97% 98%   Weight:       Height:         Oxygen Therapy:  Pulse Oximetry: 98 %, O2 (LPM): 3, O2 Delivery Device: Silicone Nasal Cannula    Physical Exam  Constitutional:       Appearance: Normal appearance.   Neck:      Vascular: No JVD.   Cardiovascular:      Rate and Rhythm: Normal rate and regular rhythm.      Pulses: Normal pulses and intact distal pulses.      Heart sounds: S1 normal and S2 normal. Heart sounds are distant. No murmur heard.     No friction rub. No gallop.      Comments: Right radial no hematoma, +1   Pulmonary:      Effort: Pulmonary effort is normal. No respiratory distress.      Breath sounds: Examination of the right-lower field reveals decreased breath sounds. Examination of the left-lower field reveals decreased breath sounds. Decreased breath sounds present. No wheezing or rales.   Neurological:      Mental Status: She is alert and oriented to person, place, and time.         Labs (personally reviewed and notable for):   Lab Results   Component Value Date/Time    SODIUM 129 (L) 03/23/2025 05:00 AM    POTASSIUM 4.4 03/23/2025 05:00 AM    CHLORIDE 93 (L) 03/23/2025 05:00 AM    CO2 29 03/23/2025 05:00 AM    GLUCOSE 323 (H) 03/23/2025 05:00 AM    BUN 40 (H) 03/23/2025 05:00 AM    CREATININE 0.79 03/23/2025 05:00 AM    BUNCREATRAT 34 (H) 12/03/2019 01:48 PM      Lab Results   Component Value " Date/Time    WBC 7.8 03/23/2025 05:00 AM    RBC 3.70 (L) 03/23/2025 05:00 AM    HEMOGLOBIN 11.2 (L) 03/23/2025 05:00 AM    HEMATOCRIT 35.2 (L) 03/23/2025 05:00 AM    MCV 95.1 03/23/2025 05:00 AM    MCH 30.3 03/23/2025 05:00 AM    MCHC 31.8 (L) 03/23/2025 05:00 AM    MPV 9.9 03/23/2025 05:00 AM    NEUTSPOLYS 82.40 (H) 03/22/2025 03:10 AM    LYMPHOCYTES 7.90 (L) 03/22/2025 03:10 AM    MONOCYTES 8.50 03/22/2025 03:10 AM    EOSINOPHILS 0.00 03/22/2025 03:10 AM    BASOPHILS 0.30 03/22/2025 03:10 AM    ANISOCYTOSIS 1+ 07/15/2021 06:10 PM      Lab Results   Component Value Date/Time    CHOLSTRLTOT 173 03/21/2025 01:45 PM    LDL 58 03/21/2025 01:45 PM     03/21/2025 01:45 PM    TRIGLYCERIDE 44 03/21/2025 01:45 PM       Lab Results   Component Value Date/Time    TROPONINT 206 (H) 03/22/2025 0310     Lab Results   Component Value Date/Time    NTPROBNP 59305 (H) 03/21/2025 1345       I personally reviewed all blood test results, EKG tracings and images of his cardiac testings.     Matilda House MD  Cardiologist, [facility]    Please note that this dictation was created using voice recognition software. I have made every reasonable attempt to correct obvious errors, but it is possible there are errors of grammar and possibly content that I did not discover before finalizing the note.

## 2025-03-24 LAB
GLUCOSE BLD STRIP.AUTO-MCNC: 199 MG/DL (ref 65–99)
GLUCOSE BLD STRIP.AUTO-MCNC: 278 MG/DL (ref 65–99)
GLUCOSE BLD STRIP.AUTO-MCNC: 346 MG/DL (ref 65–99)
GLUCOSE BLD STRIP.AUTO-MCNC: 384 MG/DL (ref 65–99)

## 2025-03-24 PROCEDURE — 700102 HCHG RX REV CODE 250 W/ 637 OVERRIDE(OP): Performed by: INTERNAL MEDICINE

## 2025-03-24 PROCEDURE — 700102 HCHG RX REV CODE 250 W/ 637 OVERRIDE(OP)

## 2025-03-24 PROCEDURE — A9270 NON-COVERED ITEM OR SERVICE: HCPCS | Performed by: INTERNAL MEDICINE

## 2025-03-24 PROCEDURE — 700111 HCHG RX REV CODE 636 W/ 250 OVERRIDE (IP): Performed by: INTERNAL MEDICINE

## 2025-03-24 PROCEDURE — A9270 NON-COVERED ITEM OR SERVICE: HCPCS

## 2025-03-24 PROCEDURE — 97162 PT EVAL MOD COMPLEX 30 MIN: CPT

## 2025-03-24 PROCEDURE — 99232 SBSQ HOSP IP/OBS MODERATE 35: CPT | Performed by: INTERNAL MEDICINE

## 2025-03-24 PROCEDURE — A9270 NON-COVERED ITEM OR SERVICE: HCPCS | Performed by: HOSPITALIST

## 2025-03-24 PROCEDURE — A9270 NON-COVERED ITEM OR SERVICE: HCPCS | Performed by: STUDENT IN AN ORGANIZED HEALTH CARE EDUCATION/TRAINING PROGRAM

## 2025-03-24 PROCEDURE — 97535 SELF CARE MNGMENT TRAINING: CPT

## 2025-03-24 PROCEDURE — 90471 IMMUNIZATION ADMIN: CPT

## 2025-03-24 PROCEDURE — 700102 HCHG RX REV CODE 250 W/ 637 OVERRIDE(OP): Performed by: HOSPITALIST

## 2025-03-24 PROCEDURE — 90677 PCV20 VACCINE IM: CPT | Performed by: INTERNAL MEDICINE

## 2025-03-24 PROCEDURE — 3E02340 INTRODUCTION OF INFLUENZA VACCINE INTO MUSCLE, PERCUTANEOUS APPROACH: ICD-10-PCS | Performed by: INTERNAL MEDICINE

## 2025-03-24 PROCEDURE — 770020 HCHG ROOM/CARE - TELE (206)

## 2025-03-24 PROCEDURE — 700102 HCHG RX REV CODE 250 W/ 637 OVERRIDE(OP): Performed by: STUDENT IN AN ORGANIZED HEALTH CARE EDUCATION/TRAINING PROGRAM

## 2025-03-24 PROCEDURE — 90662 IIV NO PRSV INCREASED AG IM: CPT | Performed by: INTERNAL MEDICINE

## 2025-03-24 PROCEDURE — 82962 GLUCOSE BLOOD TEST: CPT

## 2025-03-24 PROCEDURE — 700101 HCHG RX REV CODE 250

## 2025-03-24 PROCEDURE — 3E0234Z INTRODUCTION OF SERUM, TOXOID AND VACCINE INTO MUSCLE, PERCUTANEOUS APPROACH: ICD-10-PCS | Performed by: INTERNAL MEDICINE

## 2025-03-24 RX ADMIN — GABAPENTIN 100 MG: 100 CAPSULE ORAL at 06:22

## 2025-03-24 RX ADMIN — ACETAMINOPHEN 650 MG: 325 TABLET ORAL at 17:19

## 2025-03-24 RX ADMIN — HYDROXYZINE HYDROCHLORIDE 10 MG: 10 TABLET ORAL at 22:52

## 2025-03-24 RX ADMIN — CLOPIDOGREL BISULFATE 75 MG: 75 TABLET, FILM COATED ORAL at 06:22

## 2025-03-24 RX ADMIN — DAPAGLIFLOZIN 10 MG: 10 TABLET, FILM COATED ORAL at 06:22

## 2025-03-24 RX ADMIN — GABAPENTIN 100 MG: 100 CAPSULE ORAL at 17:19

## 2025-03-24 RX ADMIN — INSULIN LISPRO 6 UNITS: 100 INJECTION, SOLUTION INTRAVENOUS; SUBCUTANEOUS at 11:21

## 2025-03-24 RX ADMIN — ACETAMINOPHEN 650 MG: 325 TABLET ORAL at 11:25

## 2025-03-24 RX ADMIN — OXYCODONE 5 MG: 5 TABLET ORAL at 13:39

## 2025-03-24 RX ADMIN — BISOPROLOL FUMARATE 2.5 MG: 5 TABLET ORAL at 06:23

## 2025-03-24 RX ADMIN — ACETAMINOPHEN 650 MG: 325 TABLET ORAL at 06:24

## 2025-03-24 RX ADMIN — AMIODARONE HYDROCHLORIDE 200 MG: 200 TABLET ORAL at 06:22

## 2025-03-24 RX ADMIN — FLUOXETINE HYDROCHLORIDE 20 MG: 20 CAPSULE ORAL at 06:23

## 2025-03-24 RX ADMIN — ATORVASTATIN CALCIUM 40 MG: 40 TABLET, FILM COATED ORAL at 17:19

## 2025-03-24 RX ADMIN — FLUTICASONE FUROATE, UMECLIDINIUM BROMIDE AND VILANTEROL TRIFENATATE 1 PUFF: 200; 62.5; 25 POWDER RESPIRATORY (INHALATION) at 06:23

## 2025-03-24 RX ADMIN — APIXABAN 5 MG: 5 TABLET, FILM COATED ORAL at 06:22

## 2025-03-24 RX ADMIN — INSULIN LISPRO 5 UNITS: 100 INJECTION, SOLUTION INTRAVENOUS; SUBCUTANEOUS at 22:53

## 2025-03-24 RX ADMIN — INFLUENZA A VIRUS A/VICTORIA/4897/2022 IVR-238 (H1N1) ANTIGEN (FORMALDEHYDE INACTIVATED), INFLUENZA A VIRUS A/CALIFORNIA/122/2022 SAN-022 (H3N2) ANTIGEN (FORMALDEHYDE INACTIVATED), AND INFLUENZA B VIRUS B/MICHIGAN/01/2021 ANTIGEN (FORMALDEHYDE INACTIVATED) 0.5 ML: 60; 60; 60 INJECTION, SUSPENSION INTRAMUSCULAR at 10:33

## 2025-03-24 RX ADMIN — SENNOSIDES AND DOCUSATE SODIUM 2 TABLET: 50; 8.6 TABLET ORAL at 17:18

## 2025-03-24 RX ADMIN — GABAPENTIN 100 MG: 100 CAPSULE ORAL at 11:25

## 2025-03-24 RX ADMIN — HYDROXYZINE HYDROCHLORIDE 10 MG: 10 TABLET ORAL at 13:07

## 2025-03-24 RX ADMIN — LIDOCAINE 1 PATCH: 4 PATCH TOPICAL at 17:24

## 2025-03-24 RX ADMIN — AMIODARONE HYDROCHLORIDE 200 MG: 200 TABLET ORAL at 17:19

## 2025-03-24 RX ADMIN — INSULIN LISPRO 2 UNITS: 100 INJECTION, SOLUTION INTRAVENOUS; SUBCUTANEOUS at 06:30

## 2025-03-24 RX ADMIN — PNEUMOCOCCAL 20-VALENT CONJUGATE VACCINE 0.5 ML
2.2; 2.2; 2.2; 2.2; 2.2; 2.2; 2.2; 2.2; 2.2; 2.2; 2.2; 2.2; 2.2; 2.2; 2.2; 2.2; 4.4; 2.2; 2.2; 2.2 INJECTION, SUSPENSION INTRAMUSCULAR at 10:29

## 2025-03-24 RX ADMIN — INSULIN LISPRO 8 UNITS: 100 INJECTION, SOLUTION INTRAVENOUS; SUBCUTANEOUS at 17:21

## 2025-03-24 RX ADMIN — APIXABAN 5 MG: 5 TABLET, FILM COATED ORAL at 17:19

## 2025-03-24 ASSESSMENT — GAIT ASSESSMENTS
DEVIATION: BRADYKINETIC;SHUFFLED GAIT
DISTANCE (FEET): 3
ASSISTIVE DEVICE: FRONT WHEEL WALKER
GAIT LEVEL OF ASSIST: STANDBY ASSIST

## 2025-03-24 ASSESSMENT — ENCOUNTER SYMPTOMS
ROS GI COMMENTS: TOLERATING A DIET
SHORTNESS OF BREATH: 0

## 2025-03-24 ASSESSMENT — PAIN DESCRIPTION - PAIN TYPE
TYPE: ACUTE PAIN
TYPE: ACUTE PAIN

## 2025-03-24 ASSESSMENT — COGNITIVE AND FUNCTIONAL STATUS - GENERAL
CLIMB 3 TO 5 STEPS WITH RAILING: A LOT
SUGGESTED CMS G CODE MODIFIER MOBILITY: CK
STANDING UP FROM CHAIR USING ARMS: A LITTLE
MOBILITY SCORE: 19
WALKING IN HOSPITAL ROOM: A LITTLE
MOVING TO AND FROM BED TO CHAIR: A LITTLE

## 2025-03-24 ASSESSMENT — FIBROSIS 4 INDEX: FIB4 SCORE: 3.34

## 2025-03-24 NOTE — PROGRESS NOTES
Cardiology Follow Up Progress Note    Date of Service  3/24/2025    Attending Physician  Deb Ramos M.D.    Chief Complaint   Heart failure with reduced ejection fraction    DAREK Castañeda is a 79 y.o. female admitted 3/21/2025 with known history of coronary artery disease with prior stenting in the past, type 2 diabetes mellitus on insulin therapy and Farxiga, dyslipidemia, hypertension ultimately transferred in outside hospital after having a fall and was noted to have newly diagnosed heart failure with reduced ejection fraction and hypotension.  Ultimately underwent evaluation in the angiographic suite.    Interim Events  No acute overnight events.  Patient is still having some right sided reproducible chest wall pain.  No acute events per nursing staff    Review of Systems  Review of Systems    Vital signs in last 24 hours  Temp:  [36.4 °C (97.5 °F)-37 °C (98.6 °F)] 37 °C (98.6 °F)  Pulse:  [52-66] 58  Resp:  [16-27] 16  BP: (106-133)/(47-60) 128/55  SpO2:  [91 %-99 %] 92 %    Physical Exam  Physical Exam  Vitals and nursing note reviewed.   Constitutional:       Appearance: Normal appearance.   HENT:      Head: Normocephalic and atraumatic.      Nose: Nose normal.      Mouth/Throat:      Mouth: Mucous membranes are moist.      Pharynx: Oropharynx is clear.   Eyes:      Pupils: Pupils are equal, round, and reactive to light.   Cardiovascular:      Rate and Rhythm: Normal rate and regular rhythm.      Heart sounds: No murmur heard.     No friction rub. No gallop.      Comments: Right sided chest wall tenderness  Pulmonary:      Effort: Pulmonary effort is normal.      Breath sounds: Normal breath sounds.   Abdominal:      General: Bowel sounds are normal.      Palpations: Abdomen is soft.   Musculoskeletal:         General: Normal range of motion.      Cervical back: Normal range of motion and neck supple.   Skin:     General: Skin is warm and dry.   Neurological:      General: No focal deficit present.       Mental Status: She is alert and oriented to person, place, and time. Mental status is at baseline.   Psychiatric:         Mood and Affect: Mood normal.         Behavior: Behavior normal.         Thought Content: Thought content normal.         Judgment: Judgment normal.         Lab Review  Lab Results   Component Value Date/Time    WBC 7.8 03/23/2025 05:00 AM    RBC 3.70 (L) 03/23/2025 05:00 AM    HEMOGLOBIN 11.2 (L) 03/23/2025 05:00 AM    HEMATOCRIT 35.2 (L) 03/23/2025 05:00 AM    MCV 95.1 03/23/2025 05:00 AM    MCH 30.3 03/23/2025 05:00 AM    MCHC 31.8 (L) 03/23/2025 05:00 AM    MPV 9.9 03/23/2025 05:00 AM      Lab Results   Component Value Date/Time    SODIUM 129 (L) 03/23/2025 05:00 AM    POTASSIUM 4.4 03/23/2025 05:00 AM    CHLORIDE 93 (L) 03/23/2025 05:00 AM    CO2 29 03/23/2025 05:00 AM    GLUCOSE 323 (H) 03/23/2025 05:00 AM    BUN 40 (H) 03/23/2025 05:00 AM    CREATININE 0.79 03/23/2025 05:00 AM    BUNCREATRAT 34 (H) 12/03/2019 01:48 PM      Lab Results   Component Value Date/Time    ASTSGOT 33 03/22/2025 03:10 AM    ALTSGPT 16 03/22/2025 03:10 AM     Lab Results   Component Value Date/Time    CHOLSTRLTOT 173 03/21/2025 01:45 PM    LDL 58 03/21/2025 01:45 PM     03/21/2025 01:45 PM    TRIGLYCERIDE 44 03/21/2025 01:45 PM    TROPONINT 206 (H) 03/22/2025 03:10 AM       Recent Labs     03/21/25  1345   NTPROBNP 87333*       Cardiac Imaging and Procedures Review  Echocardiogram: Echocardiogram performed on 3/23/2025 demonstrates a mildly reduced LV systolic function with estimate ejection fraction 40% and apical hypokinesis    Cardiac Catheterization: Performed on 3/22/2025 demonstrated stenting to the proximal circumflex artery with a 4.5 x 12 mm and overlapping 4.5 x 20 millimeter drug-eluting stent and a: One 3.5 x 12 mm Synergy drug-eluting stent.        Assessment/Plan  Heart failure with reduced ejection fraction concerning for ischemic cardiomyopathy  Atrial fibrillation which was new  finding    Clinically, she is doing excellent and remains on appropriate medical therapy for her noted coronary artery disease.  No changes are made to her medical therapy at this time would recommend continuation of ongoing amiodarone 200 mg twice daily for 14 days followed by daily amiodarone 200 mg daily Eliquis 5 mg twice daily, Lipitor 40 mg daily bisoprolol 2.5 mg daily, Plavix 75 mg daily, Farxiga 10 mg daily      Thank you for allowing me to participate in the care of this patient.  Cardiology will sign off on this patient    Please contact me with any questions.    Howard Frey D.O.   Cardiologist, [facility]  [phone]

## 2025-03-24 NOTE — DISCHARGE PLANNING
Case Management Discharge Planning    Admission Date: 3/21/2025  GMLOS: 3.8  ALOS: 3    6-Clicks ADL Score: 24  6-Clicks Mobility Score: 22      Anticipated Discharge Dispo: Discharge Disposition: Discharged to home/self care (01)    DME Needed: No    Action(s) Taken: Updated Provider/Nurse on Discharge Plan and DC Assessment Complete (See below)    Escalations Completed: None    Medically Clear: No    Next Steps: Planning for discharge to home in [place] when medically cleared . Family will provide ride. Good family support in [place].    Barriers to Discharge: Medical clearance    Is the patient up for discharge tomorrow: Yes    Is transport arranged for discharge disposition: No, family will provide transport.

## 2025-03-24 NOTE — PROGRESS NOTES
Hospital Medicine Daily Progress Note    Date of Service  3/24/2025    Chief Complaint  Kimberly Castañeda is a 79 y.o. female admitted 3/21/2025 with chest pain    Hospital Course  Ms. Castañeda has a proximal history of coronary artery disease with prior stent and as well as normal ejection fraction by echocardiogram 2022, oxygen dependent COPD, that presented to the emergency room in [facility] with shortness of breath.  Echocardiogram there revealed apical hypokinesis suggestive of possible stress cardiomyopathy and ejection fraction of 30%.  Due to cardiogenic shock she required IV Levophed drip.  Prior to transfer she was given broad-spectrum antibiotics as well as Solu-Medrol for possible pneumonia and COPD exacerbation.  She was transferred here for direct admit to the intensive care unit with cardiology consultation.  Troponins were in the low 200s as she has been treated with IV heparin drip.  She was tapered off Levophed drip.  On 3/22/2025 she went to the cardiac Cath Lab with stent to the circumflex x 2 and first marginal branch.  After returning from the Cath Lab she went into new onset atrial fibrillation with rapid ventricular sponsor rate in the 120s.    Interval Problem Update  3/23: Ms. Castañeda was evaluated in the IMCU. She has been on an IV amiodarone drip overnight as well as an IV heparin drip due to new onset afib with rapid ventricular response. She is now in sinus rhythm. She is on dual antiplatelet therapy given her stents. Hb is 11.2. Glucose is 323. Increase lantus (5 units had been given last night and 10 units this morning). Her son and daughter in law are at bedside.  3/24: Patient seen and examined, feels weak, denies nausea or vomiting. Seen by cardiology appreciate rec.  GDMT as BP tolerates  Will need PT/OT eval     I have discussed this patient's plan of care and discharge plan at IDT rounds today with Case Management, Nursing, Nursing leadership, and other members of the  IDT team.    Consultants/Specialty  Cardiology. I discussed in person with Dr. House    Code Status  DNAR/DNI    Disposition  The patient is not medically cleared for discharge to home or a post-acute facility.      I have placed the appropriate orders for post-discharge needs.    Review of Systems  Review of Systems   Respiratory:  Negative for shortness of breath.    Cardiovascular:  Negative for chest pain.   Gastrointestinal:         Tolerating a diet        Physical Exam  Temp:  [36.4 °C (97.5 °F)-37 °C (98.6 °F)] 36.4 °C (97.5 °F)  Pulse:  [52-64] 55  Resp:  [16-27] 18  BP: (106-133)/(47-60) 128/55  SpO2:  [91 %-99 %] 93 %    Physical Exam  Constitutional:       Appearance: She is ill-appearing.   Cardiovascular:      Rate and Rhythm: Normal rate and regular rhythm.   Pulmonary:      Effort: Pulmonary effort is normal.      Breath sounds: Normal breath sounds.   Abdominal:      General: There is no distension.      Tenderness: There is no abdominal tenderness.   Musculoskeletal:      Right lower leg: No edema.      Left lower leg: No edema.   Neurological:      Mental Status: She is alert and oriented to person, place, and time.         Fluids    Intake/Output Summary (Last 24 hours) at 3/24/2025 1536  Last data filed at 3/24/2025 1300  Gross per 24 hour   Intake 240 ml   Output 2100 ml   Net -1860 ml        Laboratory  Recent Labs     03/22/25  0310 03/23/25  0500   WBC 14.1* 7.8   RBC 3.82* 3.70*   HEMOGLOBIN 11.5* 11.2*   HEMATOCRIT 38.3 35.2*   .3* 95.1   MCH 30.1 30.3   MCHC 30.0* 31.8*   RDW 57.8* 55.3*   PLATELETCT 188 200   MPV 9.9 9.9     Recent Labs     03/21/25  1740 03/22/25  0310 03/23/25  0500   SODIUM 134* 132* 129*   POTASSIUM 5.3 4.8 4.4   CHLORIDE 95* 97 93*   CO2 22 24 29   GLUCOSE 281* 128* 323*   BUN 32* 34* 40*   CREATININE 0.79 0.68 0.79   CALCIUM 10.0 9.2 8.8     Recent Labs     03/21/25  1740   APTT 26.3   INR 0.98                 Imaging  EC-ECHOCARDIOGRAM COMPLETE W/O CONT    Final Result      CL-LEFT HEART CATHETERIZATION WITH POSSIBLE INTERVENTION    (Results Pending)        Assessment/Plan  * Acute HFrEF (heart failure with reduced ejection fraction) ([facility])- (present on admission)  Assessment & Plan  EF 30% by echo and heart cath  Ischemic  S/p stents x 3  Goal directed medical therapy once her blood pressure improves    Acute coronary artery obstruction without MI ([facility])- (present on admission)  Assessment & Plan  With stenting x 3 on 3/22/2025  Eliquis and plavix for antiplatelet therapy  Start very low-dose beta-blocker  Continuous telemetry monitoring  Increase Zocor to high intensity statin Lipitor.  LDL 58    Atrial fibrillation with rapid ventricular response ([facility])- (present on admission)  Assessment & Plan  New onset post heart cath  Rate 120's  IV heparin drip will transition to Eliquis  IV amiodarone bolus and drip transition to oral  Low dose bisoprolol for rate control (she was recently in shock thus low dose).    Hyponatremia- (present on admission)  Assessment & Plan  Hypervolemic  Follow up BMP in the morning after diuretics    Shock ([facility])- (present on admission)  Assessment & Plan  Cardiogenic shock  S/p pressors      Acute on chronic respiratory failure with hypoxia ([facility])- (present on admission)  Assessment & Plan  Due to volume overload  Supplemental oxygen  Further diuresis    Type 2 diabetes mellitus with ophthalmic complication ([facility])- (present on admission)  Assessment & Plan  With hyperglycemia  Add sliding scale  HbA1c quite elevated at 12  Restart lantus 10 units daily and consider increasing the dose accordingly    Essential hypertension- (present on admission)  Assessment & Plan  BP is now low          VTE prophylaxis: eliquis     I have performed a physical exam and reviewed and updated ROS and Plan today (3/24/2025). In review of yesterday's note (3/23/2025), there are no changes except as documented above.

## 2025-03-24 NOTE — PROGRESS NOTES
Report called to ALEXANDER Whaley on T7. Patient alert and oriented.  Nil current c/o pain. VSS.  Patient transported on bed with monitoring.  All belongings and medications sent with patient.

## 2025-03-24 NOTE — DISCHARGE PLANNING
Case Management Discharge Planning    Admission Date: 3/21/2025  GMLOS: 3.8  ALOS: 3    TRANSFER BACK PATIENT    Referring Facility: [facility]  Reason for Transfer: Cardiology    Signed Repatriation/Transfer Back Agreement saved in .    Once this patient has received the requested service or the patient no longer requires tertiary level of care from [facility] and is stable to transfer back to referring facility, we can facilitate a transfer back. Please contact the [facility] at [phone] to coordinate this transfer request.

## 2025-03-24 NOTE — PROGRESS NOTES
Medium hinged ankle brace placed on pt RLE. Pt tolerating brace well at this time    Contact traction for any questions or concerns regarding this DME.

## 2025-03-24 NOTE — CARE PLAN
The patient is Stable - Low risk of patient condition declining or worsening    Shift Goals  Clinical Goals: monitor BS, monitor respiratory status/ oxygenation  Patient Goals: rest  Family Goals:     Progress made toward(s) clinical / shift goals:    Problem: Knowledge Deficit - Standard  Goal: Patient and family/care givers will demonstrate understanding of plan of care, disease process/condition, diagnostic tests and medications  Outcome: Progressing     Problem: Pain - Standard  Goal: Alleviation of pain or a reduction in pain to the patient’s comfort goal  Outcome: Progressing     Problem: Fall Risk  Goal: Patient will remain free from falls  Outcome: Progressing     Problem: Skin Integrity  Goal: Skin integrity is maintained or improved  Outcome: Progressing       06:45-19:15  Patient A&Ox4. Denies pain this morning; chest soreness from coughing. MD at bedside this morning. Remains on 3L of oxygen; baseline is 2L. Purewick remains in place. Patient with baseline numbness to left hand. BS check AC&HS with sliding scale. Bed locked in lowest position with bed alarm activated. Call light within reach. Safety precautions maintained.   13:17 - patient OOB to chair 1A with walker. Patient stated right ankle weakness d/t fall prior to admission.   17:45 - patient now with right ankle brace. 1A OOB with walker.

## 2025-03-24 NOTE — PROGRESS NOTES
Tele Strip     Rate: 54-62bpm  Rhythm: sinus ramón, SR  Ectopy: N/A  Measurements: 0.20/0.06/0.44

## 2025-03-24 NOTE — DISCHARGE PLANNING
In the case of an emergency, pt's legal NOK is son, Elisha Castañeda    RNCM met with pt at bedside and obtained the information used in this assessment. Pt verified accuracy of facesheet. Pt lives in a single story home in  [place] with adult son. Pt uses [facility] [place] [facility]. Prior to current hospitalization, pt was completely independent in ADLS/IADLS. Pt drives and is able to attend necessary MD appointments.Pt has a good support system. Pt denies any hx of substance use and denies any dx of mh. Has home 02 2 lit continuous at baseline.Latia from [place] is provider. Pt does have port concentrator with her.       Care Transition Team Assessment    Information Source:pt  Orientation Level: Oriented X4  Information Given By: Patient  Informant's Name: Kimberly  Who is responsible for making decisions for patient? : Patient         Elopement Risk  Legal Hold: No  Ambulatory or Self Mobile in Wheelchair: No-Not an Elopement Risk  Elopement Risk: Not at Risk for Elopement    Interdisciplinary Discharge Planning  Does Admitting Nurse Feel This Could be a Complex Discharge?: No  Primary Care Physician: Denny  Lives with - Patient's Self Care Capacity: Adult Children  Patient or legal guardian wants to designate a caregiver: No  Support Systems: Family Member(s)  Housing / Facility: 1 Story House  Do You Take your Prescribed Medications Regularly: Yes  Mobility Issues: No  Prior Services: None    Discharge Preparedness  What is your plan after discharge?: Home with help  What are your discharge supports?: Child  Prior Functional Level: Ambulatory, Drives Self, Independent with Activities of Daily Living, Independent with Medication Management  Difficulity with ADLs: None  Difficulity with IADLs: None    Functional Assesment  Prior Functional Level: Ambulatory, Drives Self, Independent with Activities of Daily Living, Independent with Medication Management    Finances  Prescription Coverage: Yes    Vision / Hearing  Impairment  Right Eye Vision: Impaired, Wears Glasses  Left Eye Vision: Impaired, Wears Glasses              Domestic Abuse  Possible Abuse/Neglect Reported to:: Not Applicable    Psychological Assessment  History of Substance Abuse: None  History of Psychiatric Problems: No         Anticipated Discharge Information  Discharge Disposition: Discharged to home/self care (01)

## 2025-03-24 NOTE — RESPIRATORY CARE
COPD EDUCATION by COPD CLINICAL EDUCATOR  3/23/2025  at  5:30 PM by Torres Panda, RRT     Patient interviewed by education team.  Patient declined or is unable to participate in the full program.  Therefore, a short intervention has been conducted.  A comprehensive packet including information about COPD, types of treatments to manage their disease and safe home Oxygen usage was provided and reviewed with patient at the bedside.      The patient is from [place], she quit smoking around 7 years ago. She has only been using a rescue inhaler, attending asked for trelegy as outpatient RX. Spacer and demonstration given. Action plan updated to reflect trelegy.    COPD Screen  COPD Risk Screening  Do you have a history of COPD?: Yes  Do you have a Pulmonologist?: No  COPD Population Screener  During the past 4 weeks, how much did you feel short of breath?: Some of the time  Do you ever cough up any mucus or phlegm?: No/only with occasional colds or infections  In the past 12 months, you do less than you used to because of your breathing problems: Disagree/unsure  Have you smoked at least 100 cigarettes in your entire life?: Yes  How old are you?: 60+  COPD Screening Score: 5  COPD Coordinator Not Recommended: Yes    COPD Assessment  COPD Clinical Specialists ONLY  COPD Education Initiated: Yes--Short Intervention (nice lady, seems a bit overwhelmed.  [place] 7 years ago.)  Is this a COPD exacerbation patient?: No  DME Company: [employer]  [facility]  DME Equipment Type: oxygen concentrator  Physician Name: Fallon mars, saw last week. will make appointment when she gets home  Pulmonologist Name: patient stated there are none where she lives  Referrals Initiated: Yes  Pulmonary Rehab: No  Smoking Cessation: No  Hospice: No  Home Health Care: No  Mobile Urgent Care Services: No  Geriatric Specialty Group: No  Private In-Home Care Agency: No  $ Demo/Eval of SVN's, MDI's and Aerosols: Yes (spacer given with  "demonstration)  Interdisciplinary Rounds: Attendance at Rounds (30 Min)    PFT Results    No results found for: \"PFT\"    Meds to Beds  [facility] provides bedside medication delivery for all eligible patients at discharge and you have been automatically enrolled in the Meds to Beds Program. Would you like to opt out of this program for any reason?: No - Stay Opted In     MY COPD ACTION PLAN     It is recommended that patients and physicians/healthcare providers complete this action plan together. This plan should be discussed at each physician visit and updated as needed.    The green, yellow and red zones show groups of symptoms of COPD. This list of symptoms is not comprehensive, and you may experience other symptoms. In the \"Actions\" column, your healthcare provider has recommended actions for you to take based on your symptoms.    Patient Name: Kimberly Castañeda   YOB: 1945   Last Updated on: 3/23/2025  5:29 PM   Green Zone:  I am doing well today Actions     Usual activitiy and exercise level   Take daily medications     Usual amounts of cough and phlegm/mucus   Use oxygen as prescribed     Sleep well at night   Continue regular exercise/diet plan     Appetite is good   At all times avoid cigarette smoke, inhaled irritants     Daily Medications (these medications are taken every day):   Fluticasone and Umeclidinium and Vilanterol (Trelogy) 1 Puff Once daily     Additional Information:  Take as prescribed and rinse mouth after taking    Yellow Zone:  I am having a bad day or a COPD flare Actions     More breathless than usual   Continue daily medications     I have less energy for my daily activities   Use quick relief inhaler as ordered     Increased or thicker phlegm/mucus   Use oxygen as prescribed     Using quick relief inhaler/nebulizer more often   Get plenty of rest     Swelling of ankles more than usual   Use pursed lip breathing     More coughing than usual   At all times avoid cigarette smoke, " "inhaled irritants     I feel like I have a \"chest cold\"     Poor sleep and my symptoms woke me up     My appetite is not good     My medicine is not helping      Call provider immediately if symptoms don’t improve     Continue daily medications, add rescue medications:   Albuterol 2 Puffs         Medications to be used during a flare up, (as Discussed with Provider):           Additional Information:  Use with provided spacer    Red Zone:  I need urgent medical care Actions     Severe shortness of breath even at rest   Call 911 or seek medical care immediately     Not able to do any activity because of breathing      Fever or shaking chills      Feeling confused or very drowsy       Chest pains      Coughing up blood                  "

## 2025-03-24 NOTE — PROGRESS NOTES
4 Eyes Skin Assessment Completed by ALEXANDER Whaley and ALEXANDER Stallings.    Head WDL  Ears WDL  Nose Redness  Mouth WDL  Neck WDL  Breast/Chest WDL  Shoulder Blades WDL  Spine WDL  (R) Arm/Elbow/Hand Bruising and Discoloration  (L) Arm/Elbow/Hand Bruising and Discoloration  Abdomen WDL  Groin WDL  Scrotum/Coccyx/Buttocks Redness, Blanching, and Discoloration  (R) Leg Redness and Shiny  (L) Leg Redness and Shiny  (R) Heel/Foot/Toe Redness and Blanching  (L) Heel/Foot/Toe Redness and Blanching          Devices In Places Tele Box, Blood Pressure Cuff, Pulse Ox, and Nasal Cannula      Interventions In Place Heel Mepilex, TAP System, Pillows, Barrier Cream, and Dri-Jamin Pads    Possible Skin Injury No    Pictures Uploaded Into Epic Yes  Wound Consult Placed N/A  RN Wound Prevention Protocol Ordered Yes

## 2025-03-24 NOTE — THERAPY
Physical Therapy   Initial Evaluation     Patient Name: Kimberly Castañeda  Age:  79 y.o., Sex:  female  Medical Record #: [id number]  Today's Date: 3/24/2025     Precautions  Precautions: Fall Risk;Cardiac Precautions (See Comments)  Comments: R ankle sprain    Assessment  Patient is an 79 y.o. female who was admitted with chest pain. Pt diagnosed with new HFrEF at 30%, cardiogenic shock, new onset Afib, and is s/p LHC with MELISSA to LCA. PMH significant for COPD on home O2, CAD with prior stent, diabetes, HTN, anemia.    Of note, pt also presents with R ankle pain after a recent fall at home. Per family at bedside, xray negative for fx in [facility]. Based on assessment, suspect lateral ankle sprain and brace recommended for comfort/support to facilitate mobility for return home.    Pt received in a chair at bedside and agreeable to PT evaluation. Pt now with ankle brace and reports improvement in pain and weightbearing. Pt was able to stand multiple times with a FWW and transfer to a commode with SBA. Pt limited with ambulation secondary to need for bowel movement. Discussed use of ankle brace and FWW for return home, follow up with PT if able to set up telehealth, progression of activity given cardiac health and procedures, and progressing mobility with nursing staff. Anticipate pt will be able to return home with family support. Will follow up to progress as able if pt remains in the hospital.    Plan    Physical Therapy Initial Treatment Plan   Treatment Plan : Bed Mobility, Gait Training, Neuro Re-Education / Balance, Self Care / Home Evaluation, Stair Training, Therapeutic Activities, Therapeutic Exercise  Treatment Frequency: 4 Times per Week  Duration: Until Therapy Goals Met    DC Equipment Recommendations: None  Discharge Recommendations:  (Pt reports no availability of HHPT or outpatient PT in their area. Discussed telehealth PT with pt and family for ankle sprain. Family to assist in attempting to get PT  "set up.)     Subjective    \"That feels a lot better with the brace\"     Objective       03/24/25 1530   Precautions   Precautions Fall Risk;Cardiac Precautions (See Comments)   Comments R ankle sprain   Vitals   O2 (LPM) 3   O2 Delivery Device Nasal Cannula   Pain 0 - 10 Group   Therapist Pain Assessment Post Activity Pain Same as Prior to Activity;Nurse Notified  (R ankle pain, improved with brace)   Prior Living Situation   Prior Services None   Housing / Facility 1 Story House   Steps Into Home 0   Steps In Home 0   Equipment Owned Front-Wheel Walker   Lives with - Patient's Self Care Capacity Adult Children   Comments Pt reports her son works during the day and can assist at night when he is home.   Prior Level of Functional Mobility   Bed Mobility Independent   Transfer Status Independent   Ambulation Independent   Assistive Devices Used None   Stairs Independent   History of Falls   History of Falls Yes   Date of Last Fall   (related to admission)   Cognition    Level of Consciousness Alert   Comments Very pleasant and cooperative, receptive to education   Active ROM Lower Body    Comments R ankle DF/PF and inversion/eversion limited by pain   Strength Lower Body   Comments BLE grossly WDL other than R ankle with suspected sprain   Sensation Lower Body   Lower Extremity Sensation   WDL   Lower Body Muscle Tone   Lower Body Muscle Tone  WDL   Other Treatments   Other Treatments Provided Assessed R ankle due to c/o pain. Noted bruising and tenderness to palpation around lateral malleolus and foot. Per family, pt had xray at prior hospital that was negative for fx. Discussed with pt, family, and MD high suspicion for ankle sprain given pt presentation and symptoms. Recommended ankle support brace, which was delivered to bedside.   Balance Assessment   Sitting Balance (Static) Fair +   Sitting Balance (Dynamic) Fair   Standing Balance (Static) Fair   Standing Balance (Dynamic) Fair   Weight Shift Sitting Good "   Weight Shift Standing Fair   Comments FWW in standing, no losses of balance with transfer   Bed Mobility    Comments in chair pre and post   Gait Analysis   Gait Level Of Assist Standby Assist   Assistive Device Front Wheel Walker   Distance (Feet) 3   # of Times Distance was Traveled 1   Deviation Bradykinetic;Shuffled Gait   Comments Limited by pt need to have bowel movement. Reports significant improvement in tolerance to RLE weightbearing with ankle brace   Functional Mobility   Sit to Stand Standby Assist   Bed, Chair, Wheelchair Transfer Standby Assist   Transfer Method Stand Step   Mobility chair>commode, multiple STS   Comments no losses of balance or unsteadiness   6 Clicks Assessment - How much HELP from from another person do you currently need... (If the patient hasn't done an activity recently, how much help from another person do you think he/she would need if he/she tried?)   Turning from your back to your side while in a flat bed without using bedrails? 4   Moving from lying on your back to sitting on the side of a flat bed without using bedrails? 4   Moving to and from a bed to a chair (including a wheelchair)? 3   Standing up from a chair using your arms (e.g., wheelchair, or bedside chair)? 3   Walking in hospital room? 3   Climbing 3-5 steps with a railing? 2   6 clicks Mobility Score 19   Short Term Goals    Short Term Goal # 1 Pt will complete bed mobility with supervision from a flat bed to progress function in 6 visits.   Short Term Goal # 2 Pt will transfer with FWW and supervision to progress function in 6 visits.   Short Term Goal # 3 Pt will ambulate 150ft with FWW and supervision to progress function in 6 visits.   Education Group   Education Provided Role of Physical Therapist   Role of Physical Therapist Patient Response Patient;Acceptance;Explanation;Verbal Demonstration   Physical Therapy Initial Treatment Plan    Treatment Plan  Bed Mobility;Gait Training;Neuro Re-Education /  Balance;Self Care / Home Evaluation;Stair Training;Therapeutic Activities;Therapeutic Exercise   Treatment Frequency 4 Times per Week   Duration Until Therapy Goals Met   Problem List    Problems Impaired Transfers;Impaired Ambulation;Functional ROM Deficit;Functional Strength Deficit;Impaired Balance;Decreased Activity Tolerance   Anticipated Discharge Equipment and Recommendations   DC Equipment Recommendations None   Discharge Recommendations   (Pt reports no availability of HHPT or outpatient PT in their area. Discussed telehealth PT with pt and family for ankle sprain. Family to assist in attempting to get PT set up.)   Interdisciplinary Plan of Care Collaboration   IDT Collaboration with  Nursing;Family / Caregiver;Physician   Patient Position at End of Therapy Seated;Call Light within Reach;Tray Table within Reach;Phone within Reach;Family / Friend in Room  (on commode)   Collaboration Comments RN updated   Session Information   Date / Session Number  3/24-1 (1/4, 3/30)

## 2025-03-24 NOTE — PROGRESS NOTES
2020: Report received by RNCiara in IMCU at 2010. Patient transported to floor 2020. Patient denies pain, nausea, SOB. Patient assisted to bed using slide board, bed scale utilized. Pt resting in bed, breathing even and unlabored on 3LNC, denies pain and in no acute distress. A&O x4. Tele monitoring in place. Fall precautions including bed alarm and no slip socks in place and education provided. Call light within reach, bed locked and in lowest position, denies other needs at this time.

## 2025-03-25 PROBLEM — S93.401A RIGHT ANKLE SPRAIN: Status: ACTIVE | Noted: 2025-03-25

## 2025-03-25 LAB
ANION GAP SERPL CALC-SCNC: 7 MMOL/L (ref 7–16)
BUN SERPL-MCNC: 37 MG/DL (ref 8–22)
CALCIUM SERPL-MCNC: 9.7 MG/DL (ref 8.5–10.5)
CHLORIDE SERPL-SCNC: 93 MMOL/L (ref 96–112)
CO2 SERPL-SCNC: 29 MMOL/L (ref 20–33)
CREAT SERPL-MCNC: 0.85 MG/DL (ref 0.5–1.4)
ERYTHROCYTE [DISTWIDTH] IN BLOOD BY AUTOMATED COUNT: 52 FL (ref 35.9–50)
GFR SERPLBLD CREATININE-BSD FMLA CKD-EPI: 69 ML/MIN/1.73 M 2
GLUCOSE BLD STRIP.AUTO-MCNC: 231 MG/DL (ref 65–99)
GLUCOSE BLD STRIP.AUTO-MCNC: 235 MG/DL (ref 65–99)
GLUCOSE BLD STRIP.AUTO-MCNC: 237 MG/DL (ref 65–99)
GLUCOSE BLD STRIP.AUTO-MCNC: 325 MG/DL (ref 65–99)
GLUCOSE SERPL-MCNC: 197 MG/DL (ref 65–99)
HCT VFR BLD AUTO: 34.7 % (ref 37–47)
HGB BLD-MCNC: 11.3 G/DL (ref 12–16)
MCH RBC QN AUTO: 30.8 PG (ref 27–33)
MCHC RBC AUTO-ENTMCNC: 32.6 G/DL (ref 32.2–35.5)
MCV RBC AUTO: 94.6 FL (ref 81.4–97.8)
PLATELET # BLD AUTO: 208 K/UL (ref 164–446)
PMV BLD AUTO: 9.7 FL (ref 9–12.9)
POTASSIUM SERPL-SCNC: 4.9 MMOL/L (ref 3.6–5.5)
PROCALCITONIN SERPL-MCNC: 0.12 NG/ML
RBC # BLD AUTO: 3.67 M/UL (ref 4.2–5.4)
SODIUM SERPL-SCNC: 129 MMOL/L (ref 135–145)
WBC # BLD AUTO: 6.8 K/UL (ref 4.8–10.8)

## 2025-03-25 PROCEDURE — A9270 NON-COVERED ITEM OR SERVICE: HCPCS

## 2025-03-25 PROCEDURE — A9270 NON-COVERED ITEM OR SERVICE: HCPCS | Performed by: STUDENT IN AN ORGANIZED HEALTH CARE EDUCATION/TRAINING PROGRAM

## 2025-03-25 PROCEDURE — 700111 HCHG RX REV CODE 636 W/ 250 OVERRIDE (IP): Mod: JZ | Performed by: NURSE PRACTITIONER

## 2025-03-25 PROCEDURE — 99233 SBSQ HOSP IP/OBS HIGH 50: CPT | Performed by: INTERNAL MEDICINE

## 2025-03-25 PROCEDURE — 97530 THERAPEUTIC ACTIVITIES: CPT

## 2025-03-25 PROCEDURE — 84300 ASSAY OF URINE SODIUM: CPT

## 2025-03-25 PROCEDURE — 83935 ASSAY OF URINE OSMOLALITY: CPT

## 2025-03-25 PROCEDURE — 81001 URINALYSIS AUTO W/SCOPE: CPT

## 2025-03-25 PROCEDURE — 700102 HCHG RX REV CODE 250 W/ 637 OVERRIDE(OP): Performed by: HOSPITALIST

## 2025-03-25 PROCEDURE — 36415 COLL VENOUS BLD VENIPUNCTURE: CPT

## 2025-03-25 PROCEDURE — A9270 NON-COVERED ITEM OR SERVICE: HCPCS | Performed by: HOSPITALIST

## 2025-03-25 PROCEDURE — 700102 HCHG RX REV CODE 250 W/ 637 OVERRIDE(OP): Performed by: INTERNAL MEDICINE

## 2025-03-25 PROCEDURE — 700102 HCHG RX REV CODE 250 W/ 637 OVERRIDE(OP)

## 2025-03-25 PROCEDURE — 700101 HCHG RX REV CODE 250

## 2025-03-25 PROCEDURE — 87040 BLOOD CULTURE FOR BACTERIA: CPT

## 2025-03-25 PROCEDURE — A9270 NON-COVERED ITEM OR SERVICE: HCPCS | Performed by: INTERNAL MEDICINE

## 2025-03-25 PROCEDURE — 80048 BASIC METABOLIC PNL TOTAL CA: CPT

## 2025-03-25 PROCEDURE — 82570 ASSAY OF URINE CREATININE: CPT

## 2025-03-25 PROCEDURE — 770020 HCHG ROOM/CARE - TELE (206)

## 2025-03-25 PROCEDURE — 84145 PROCALCITONIN (PCT): CPT

## 2025-03-25 PROCEDURE — 700105 HCHG RX REV CODE 258: Performed by: NURSE PRACTITIONER

## 2025-03-25 PROCEDURE — 700105 HCHG RX REV CODE 258: Performed by: INTERNAL MEDICINE

## 2025-03-25 PROCEDURE — 700102 HCHG RX REV CODE 250 W/ 637 OVERRIDE(OP): Performed by: STUDENT IN AN ORGANIZED HEALTH CARE EDUCATION/TRAINING PROGRAM

## 2025-03-25 PROCEDURE — 82962 GLUCOSE BLOOD TEST: CPT | Mod: 91

## 2025-03-25 PROCEDURE — 85027 COMPLETE CBC AUTOMATED: CPT

## 2025-03-25 RX ORDER — INSULIN LISPRO 100 [IU]/ML
3-14 INJECTION, SOLUTION INTRAVENOUS; SUBCUTANEOUS
Status: DISCONTINUED | OUTPATIENT
Start: 2025-03-25 | End: 2025-03-26 | Stop reason: HOSPADM

## 2025-03-25 RX ORDER — DEXTROSE MONOHYDRATE 25 G/50ML
25 INJECTION, SOLUTION INTRAVENOUS
Status: DISCONTINUED | OUTPATIENT
Start: 2025-03-25 | End: 2025-03-26 | Stop reason: HOSPADM

## 2025-03-25 RX ORDER — SODIUM CHLORIDE 9 MG/ML
INJECTION, SOLUTION INTRAVENOUS CONTINUOUS
Status: DISCONTINUED | OUTPATIENT
Start: 2025-03-25 | End: 2025-03-26 | Stop reason: HOSPADM

## 2025-03-25 RX ADMIN — PIPERACILLIN AND TAZOBACTAM 3.38 G: 3; .375 INJECTION, POWDER, FOR SOLUTION INTRAVENOUS at 22:28

## 2025-03-25 RX ADMIN — BISOPROLOL FUMARATE 2.5 MG: 5 TABLET ORAL at 05:42

## 2025-03-25 RX ADMIN — GABAPENTIN 100 MG: 100 CAPSULE ORAL at 11:50

## 2025-03-25 RX ADMIN — HYDROXYZINE HYDROCHLORIDE 10 MG: 10 TABLET ORAL at 09:01

## 2025-03-25 RX ADMIN — ATORVASTATIN CALCIUM 40 MG: 40 TABLET, FILM COATED ORAL at 17:24

## 2025-03-25 RX ADMIN — OXYCODONE 5 MG: 5 TABLET ORAL at 09:35

## 2025-03-25 RX ADMIN — INSULIN LISPRO 4 UNITS: 100 INJECTION, SOLUTION INTRAVENOUS; SUBCUTANEOUS at 07:32

## 2025-03-25 RX ADMIN — AMIODARONE HYDROCHLORIDE 200 MG: 200 TABLET ORAL at 05:42

## 2025-03-25 RX ADMIN — AMIODARONE HYDROCHLORIDE 200 MG: 200 TABLET ORAL at 17:24

## 2025-03-25 RX ADMIN — LIDOCAINE 1 PATCH: 4 PATCH TOPICAL at 17:24

## 2025-03-25 RX ADMIN — DAPAGLIFLOZIN 10 MG: 10 TABLET, FILM COATED ORAL at 05:43

## 2025-03-25 RX ADMIN — GABAPENTIN 100 MG: 100 CAPSULE ORAL at 05:43

## 2025-03-25 RX ADMIN — SODIUM CHLORIDE: 9 INJECTION, SOLUTION INTRAVENOUS at 22:45

## 2025-03-25 RX ADMIN — OXYCODONE 5 MG: 5 TABLET ORAL at 00:26

## 2025-03-25 RX ADMIN — INSULIN LISPRO 10 UNITS: 100 INJECTION, SOLUTION INTRAVENOUS; SUBCUTANEOUS at 11:49

## 2025-03-25 RX ADMIN — ACETAMINOPHEN 650 MG: 325 TABLET ORAL at 11:50

## 2025-03-25 RX ADMIN — ACETAMINOPHEN 650 MG: 325 TABLET ORAL at 00:26

## 2025-03-25 RX ADMIN — GABAPENTIN 100 MG: 100 CAPSULE ORAL at 17:24

## 2025-03-25 RX ADMIN — ACETAMINOPHEN 650 MG: 325 TABLET ORAL at 05:41

## 2025-03-25 RX ADMIN — APIXABAN 5 MG: 5 TABLET, FILM COATED ORAL at 17:24

## 2025-03-25 RX ADMIN — INSULIN LISPRO 4 UNITS: 100 INJECTION, SOLUTION INTRAVENOUS; SUBCUTANEOUS at 17:26

## 2025-03-25 RX ADMIN — INSULIN LISPRO 4 UNITS: 100 INJECTION, SOLUTION INTRAVENOUS; SUBCUTANEOUS at 20:41

## 2025-03-25 RX ADMIN — ACETAMINOPHEN 650 MG: 325 TABLET ORAL at 17:24

## 2025-03-25 RX ADMIN — SENNOSIDES AND DOCUSATE SODIUM 2 TABLET: 50; 8.6 TABLET ORAL at 17:24

## 2025-03-25 RX ADMIN — FLUOXETINE HYDROCHLORIDE 20 MG: 20 CAPSULE ORAL at 05:43

## 2025-03-25 RX ADMIN — HYDROXYZINE HYDROCHLORIDE 10 MG: 10 TABLET ORAL at 20:39

## 2025-03-25 RX ADMIN — OXYCODONE 5 MG: 5 TABLET ORAL at 21:10

## 2025-03-25 RX ADMIN — CLOPIDOGREL BISULFATE 75 MG: 75 TABLET, FILM COATED ORAL at 05:43

## 2025-03-25 RX ADMIN — APIXABAN 5 MG: 5 TABLET, FILM COATED ORAL at 05:42

## 2025-03-25 RX ADMIN — FLUTICASONE FUROATE, UMECLIDINIUM BROMIDE AND VILANTEROL TRIFENATATE 1 PUFF: 200; 62.5; 25 POWDER RESPIRATORY (INHALATION) at 09:36

## 2025-03-25 ASSESSMENT — PAIN DESCRIPTION - PAIN TYPE
TYPE: ACUTE PAIN

## 2025-03-25 ASSESSMENT — COGNITIVE AND FUNCTIONAL STATUS - GENERAL
MOBILITY SCORE: 19
SUGGESTED CMS G CODE MODIFIER MOBILITY: CK
MOVING TO AND FROM BED TO CHAIR: A LITTLE
STANDING UP FROM CHAIR USING ARMS: A LITTLE
MOBILITY SCORE: 17
CLIMB 3 TO 5 STEPS WITH RAILING: A LOT
STANDING UP FROM CHAIR USING ARMS: A LITTLE
WALKING IN HOSPITAL ROOM: A LITTLE
WALKING IN HOSPITAL ROOM: A LITTLE
MOVING TO AND FROM BED TO CHAIR: A LITTLE
SUGGESTED CMS G CODE MODIFIER DAILY ACTIVITY: CH
SUGGESTED CMS G CODE MODIFIER MOBILITY: CK
MOVING FROM LYING ON BACK TO SITTING ON SIDE OF FLAT BED: A LITTLE
DAILY ACTIVITIY SCORE: 24
TURNING FROM BACK TO SIDE WHILE IN FLAT BAD: A LITTLE
CLIMB 3 TO 5 STEPS WITH RAILING: A LOT

## 2025-03-25 ASSESSMENT — GAIT ASSESSMENTS
DEVIATION: ANTALGIC
DISTANCE (FEET): 8
ASSISTIVE DEVICE: FRONT WHEEL WALKER
GAIT LEVEL OF ASSIST: CONTACT GUARD ASSIST

## 2025-03-25 ASSESSMENT — FIBROSIS 4 INDEX: FIB4 SCORE: 3.21

## 2025-03-25 ASSESSMENT — ENCOUNTER SYMPTOMS: SHORTNESS OF BREATH: 0

## 2025-03-25 NOTE — PROGRESS NOTES
Report received. Assumed care. Pt AAOx4. Focused Assessment complete. VSS. Pt states moderate pain to ankle/knee bilaterally along with R sided CP that is reproducible with movement stable from admission; will medicate per orders. No other complaints reported; Pt was updated on POC, tele monitoring, monitor BP, PT/OT in AM. White board updated, All question answered. Pt has call light within reach, bed is in the lowest position. Pt has no other needs at this time.

## 2025-03-25 NOTE — ASSESSMENT & PLAN NOTE
Lab Results   Component Value Date/Time    CHOLSTRLTOT 173 03/21/2025 01:45 PM    LDL 58 03/21/2025 01:45 PM     03/21/2025 01:45 PM    TRIGLYCERIDE 44 03/21/2025 01:45 PM       Continue atorvastatin

## 2025-03-25 NOTE — THERAPY
Physical Therapy   Daily Treatment     Patient Name: Kimberly Castañeda  Age:  79 y.o., Sex:  female  Medical Record #: [id number]  Today's Date: 3/25/2025     Precautions  Precautions: Fall Risk;Cardiac Precautions (See Comments)  Comments: R ankle sprain; s/p LHC with MELISSA to circumflex and first marginal, EF 30%    Assessment    Patient continues to be limited by pain, weakness, and decreased activity tolerance. She mobilized as detailed below; was able to ambulate short distance in room with FWW and CGA but did not demonstrate adequate mobility for recommendation of DC home. Will follow.    Plan    Treatment Plan Status: Continue Current Treatment Plan  Type of Treatment: Bed Mobility, Gait Training, Neuro Re-Education / Balance, Self Care / Home Evaluation, Stair Training, Therapeutic Activities, Therapeutic Exercise  Treatment Frequency: 4 Times per Week  Treatment Duration: Until Therapy Goals Met    DC Equipment Recommendations: Unable to determine at this time  Discharge Recommendations: Recommend post-acute placement for additional physical therapy services prior to discharge home      Subjective    Patient received in recliner and agreeable to treatment     Objective       03/25/25 1129   Vitals   O2 (LPM) 2   O2 Delivery Device Silicone Nasal Cannula   Pain 0 - 10 Group   Location Ankle;Knee   Location Orientation Right;Left   Therapist Pain Assessment During Activity;Nurse Notified   Cognition    Cognition / Consciousness WDL   Level of Consciousness Alert   Comments very pleasant. cooperative, motivated   Active ROM Lower Body    Comments functional for mobility. donned R ankle brace   Strength Lower Body   Comments grossly 3+/5 BLE; patient reported weakness as compared to baseline   Other Treatments   Other Treatments Provided adjusted brace as it was on incorrectly   Balance   Sitting Balance (Static) Fair +   Sitting Balance (Dynamic) Fair   Standing Balance (Static) Fair   Standing Balance (Dynamic) Fair  -   Weight Shift Sitting Good   Weight Shift Standing Fair   Skilled Intervention Verbal Cuing;Compensatory Strategies;Sequencing   Comments patient used FWW in standing. no overt LOB but BLE weight acceptance limited due to pain   Bed Mobility    Supine to Sit   (NT, in recliner on entry)   Sit to Supine Minimal Assist  (for BLE into bed)   Scooting Standby Assist  (seated)   Skilled Intervention Verbal Cuing;Compensatory Strategies;Sequencing;Facilitation   Gait Analysis   Gait Level Of Assist Contact Guard Assist   Assistive Device Front Wheel Walker   Distance (Feet) 8   # of Times Distance was Traveled 2   Deviation Antalgic  (decreased augustin, clearance, and step length)   # of Stairs Climbed 0   Weight Bearing Status no restrictions   Vision Deficits Impacting Mobility denied deficits   Skilled Intervention Verbal Cuing;Compensatory Strategies;Sequencing   Comments distance limited by pain and weakness   Functional Mobility   Sit to Stand Minimal Assist   Bed, Chair, Wheelchair Transfer Minimal Assist   Transfer Method Stand Step   Mobility recliner to EOB, walked around bed, returned to supine   Skilled Intervention Verbal Cuing;Compensatory Strategies;Sequencing;Facilitation   6 Clicks Assessment - How much HELP from from another person do you currently need... (If the patient hasn't done an activity recently, how much help from another person do you think he/she would need if he/she tried?)   Turning from your back to your side while in a flat bed without using bedrails? 3   Moving from lying on your back to sitting on the side of a flat bed without using bedrails? 3   Moving to and from a bed to a chair (including a wheelchair)? 3   Standing up from a chair using your arms (e.g., wheelchair, or bedside chair)? 3   Walking in hospital room? 3   Climbing 3-5 steps with a railing? 2   6 clicks Mobility Score 17   Short Term Goals    Short Term Goal # 1 Pt will complete bed mobility with supervision from a  flat bed to progress function in 6 visits.   Goal Outcome # 1 goal not met   Short Term Goal # 2 Pt will transfer with FWW and supervision to progress function in 6 visits.   Goal Outcome # 2 Goal not met   Short Term Goal # 3 Pt will ambulate 150ft with FWW and supervision to progress function in 6 visits.   Goal Outcome # 3 Goal not met

## 2025-03-25 NOTE — HOSPITAL COURSE
Ms. Castañeda has a past medical history of coronary artery disease with prior stent and as well as normal ejection fraction by echocardiogram 2022, oxygen dependent COPD, that presented to the emergency room in [facility] with shortness of breath.  Echocardiogram there revealed apical hypokinesis suggestive of possible stress cardiomyopathy and ejection fraction of 30%.  Due to cardiogenic shock she required IV Levophed drip.  Prior to transfer she was given broad-spectrum antibiotics as well as Solu-Medrol for possible pneumonia and COPD exacerbation.  She was transferred here for direct admit to the intensive care unit with cardiology consultation.  Troponins were in the low 200s as she has been treated with IV heparin drip.  She was tapered off Levophed drip for cardiogenic shock.  On 3/22/2025 she went to the cardiac Cath Lab with stent to the circumflex x 2 and first marginal branch.  After returning from the Cath Lab she went into new onset atrial fibrillation with rapid ventricular response rate in the 120s.  Patient was started on IV amiodarone in addition to heparin drip    Repeat echocardiogram here showed ejection fraction 40%, apical hypokinesis.    With recent cardiogenic shock requiring pressors and bradycardia patient was started on low-dose bisoprolol.  With recent coronary stents she was started on apixaban and clopidogrel for antiplatelet therapy.  Heparin drip was switched to oral apixaban.  She was also transitioned to oral amiodarone.  Outpatient simvastatin was switched to high intensity atorvastatin 40 mg.    Patient was normotensive with some bradycardia while on low-dose bisoprolol 2.5 mg.  Outpatient furosemide, metoprolol were discontinued.  Benazepril was switched to low-dose lisinopril.  She was switched from Jardiance to Farxiga    There was concern for new bacteremia with blood culture from [facility] growing gram-positive cocci.  Blood cultures repeated here show no growth.   She was started on Zosyn on 3/25.  Otherwise she has not received any antibiotics during this admission    PT/OT did recommend postacute placement    Patient is medically stable to transfer back to [facility].  This was discussed with their hospitalist.

## 2025-03-25 NOTE — PROGRESS NOTES
[facility] Medicine Daily Progress Note    Date of Service  3/25/2025    Chief Complaint  Kimberly Castañeda is a 79 y.o. female admitted 3/21/2025 with No chief complaint on file.      Hospital Course  Ms. Castañeda has a proximal history of coronary artery disease with prior stent and as well as normal ejection fraction by echocardiogram 2022, oxygen dependent COPD, that presented to the emergency room in [facility] with shortness of breath.  Echocardiogram there revealed apical hypokinesis suggestive of possible stress cardiomyopathy and ejection fraction of 30%.  Due to cardiogenic shock she required IV Levophed drip.  Prior to transfer she was given broad-spectrum antibiotics as well as Solu-Medrol for possible pneumonia and COPD exacerbation.  She was transferred here for direct admit to the intensive care unit with cardiology consultation.  Troponins were in the low 200s as she has been treated with IV heparin drip.  She was tapered off Levophed drip.  On 3/22/2025 she went to the cardiac Cath Lab with stent to the circumflex x 2 and first marginal branch.  After returning from the Cath Lab she went into new onset atrial fibrillation with rapid ventricular response rate in the 120s.  Patient was started on IV amiodarone in addition to heparin drip    Repeat echocardiogram here showed ejection fraction 40%, apical hypokinesis.    With recent cardiogenic shock requiring pressors and bradycardia patient was started on low-dose bisoprolol.  With recent coronary stents she was started on apixaban and clopidogrel for antiplatelet therapy.  Heparin drip was switched to oral apixaban.  She was also transitioned to oral amiodarone.    Interval Problem Update  Patient was seen and examined at bedside.  I have personally reviewed and interpreted vitals, labs, and imaging.    3/25.  Afebrile.  Has been bradycardic.  On 2-3 L nasal cannula.  Denies fever, chills, chest pains, shortness of breath.  Order diabetic  education.  Patient may need to go home on sliding scale rather than oral hypoglycemics.  Still having some right ankle pain.  PT/OT recommended postacute placement.  Consulted physiatry for consideration of postacute rehab.  Wbc 16.8 > 14.1 > 7.8 > 6.8  Hgb 11.3  Na 129  Procal 0.44  A1c 12     LDL 58    I have discussed this patient's plan of care and discharge plan at IDT rounds today with Case Management, Nursing, Nursing leadership, and other members of the IDT team.    Consultants/Specialty  cardiology    Code Status  DNAR/DNI    Disposition  The patient is not medically cleared for discharge to home or a post-acute facility.  Anticipate discharge to: skilled nursing facility    I have placed the appropriate orders for post-discharge needs.    Review of Systems  Review of Systems   Respiratory:  Negative for shortness of breath.    Cardiovascular:  Negative for chest pain.        Physical Exam  Temp:  [36.4 °C (97.5 °F)-36.7 °C (98.1 °F)] 36.7 °C (98.1 °F)  Pulse:  [51-65] 51  Resp:  [18] 18  BP: (106-126)/(38-56) 116/53  SpO2:  [90 %-96 %] 96 %    Physical Exam  Vitals and nursing note reviewed.   Constitutional:       Appearance: Normal appearance. She is ill-appearing.   HENT:      Head: Normocephalic and atraumatic.      Right Ear: External ear normal.      Left Ear: External ear normal.      Nose: Nose normal.      Mouth/Throat:      Mouth: Mucous membranes are moist.      Pharynx: Oropharynx is clear. No oropharyngeal exudate or posterior oropharyngeal erythema.   Eyes:      Extraocular Movements: Extraocular movements intact.      Conjunctiva/sclera: Conjunctivae normal.   Cardiovascular:      Rate and Rhythm: Regular rhythm. Bradycardia present.      Pulses: Normal pulses.      Heart sounds: Normal heart sounds. No murmur heard.  Pulmonary:      Effort: Pulmonary effort is normal. No respiratory distress.      Breath sounds: Normal breath sounds. No stridor. No wheezing or rales.   Abdominal:       General: Abdomen is flat. Bowel sounds are normal. There is no distension.      Palpations: Abdomen is soft. There is no mass.      Tenderness: There is no abdominal tenderness.   Musculoskeletal:      Cervical back: Normal range of motion.   Skin:     General: Skin is warm.      Capillary Refill: Capillary refill takes less than 2 seconds.   Neurological:      General: No focal deficit present.      Mental Status: She is alert and oriented to person, place, and time. Mental status is at baseline.      Cranial Nerves: No cranial nerve deficit.   Psychiatric:         Mood and Affect: Mood normal.         Behavior: Behavior normal.         Fluids    Intake/Output Summary (Last 24 hours) at 3/25/2025 1625  Last data filed at 3/25/2025 0748  Gross per 24 hour   Intake 480 ml   Output 2000 ml   Net -1520 ml        Laboratory  Recent Labs     03/23/25  0500 03/25/25  0143   WBC 7.8 6.8   RBC 3.70* 3.67*   HEMOGLOBIN 11.2* 11.3*   HEMATOCRIT 35.2* 34.7*   MCV 95.1 94.6   MCH 30.3 30.8   MCHC 31.8* 32.6   RDW 55.3* 52.0*   PLATELETCT 200 208   MPV 9.9 9.7     Recent Labs     03/23/25  0500 03/25/25  0143   SODIUM 129* 129*   POTASSIUM 4.4 4.9   CHLORIDE 93* 93*   CO2 29 29   GLUCOSE 323* 197*   BUN 40* 37*   CREATININE 0.79 0.85   CALCIUM 8.8 9.7                   Imaging  EC-ECHOCARDIOGRAM COMPLETE W/O CONT   Final Result      CL-LEFT HEART CATHETERIZATION WITH POSSIBLE INTERVENTION    (Results Pending)        Assessment/Plan  * Acute HFrEF (heart failure with reduced ejection fraction) (HCC)- (present on admission)  Assessment & Plan  3/25/2025  EF 30% by echo and heart cath  Ischemic  S/p stents x 3  Goal directed medical therapy once her blood pressure improves    Right ankle sprain  Assessment & Plan  3/25/2025  After recent fall at home.  PT/OT recommended postacute placement    Acute coronary artery obstruction without MI (HCC)- (present on admission)  Assessment & Plan  3/25/2025  With stenting x 3 on  3/22/2025  Eliquis and plavix for antiplatelet therapy  Start very low-dose beta-blocker  Continuous telemetry monitoring  Increase Zocor to high intensity statin Lipitor.  LDL 58    Atrial fibrillation with rapid ventricular response (HCC)- (present on admission)  Assessment & Plan  3/25/2025  New onset post heart cath  Rate 120's  IV heparin drip will transition to Eliquis  IV amiodarone bolus and drip transition to oral  Low dose bisoprolol for rate control (she was recently in shock thus low dose).    Hyponatremia- (present on admission)  Assessment & Plan  3/25/2025  Continue fluid restriction, diuresis  Hypervolemic  Follow up BMP in the morning after diuretics    Shock (HCC)- (present on admission)  Assessment & Plan  3/25/2025   Cardiogenic shock  S/p pressors    Acute on chronic respiratory failure with hypoxia (HCC)- (present on admission)  Assessment & Plan  3/25/2025  Due to volume overload  Supplemental oxygen  Further diuresis    Type 2 diabetes mellitus with ophthalmic complication (HCC)- (present on admission)  Assessment & Plan  3/25/2025  With hyperglycemia  Add sliding scale  HbA1c quite elevated at 12  Restart lantus 10 units daily and consider increasing the dose accordingly  Holding outpatient metformin, pioglitazone, dapagliflozin  Order diabetic education.  Patient states she would be open to insulin sliding scale if needed.    Dyslipidemia- (present on admission)  Assessment & Plan  Lab Results   Component Value Date/Time    CHOLSTRLTOT 173 03/21/2025 01:45 PM    LDL 58 03/21/2025 01:45 PM     03/21/2025 01:45 PM    TRIGLYCERIDE 44 03/21/2025 01:45 PM       Continue atorvastatin    Essential hypertension- (present on admission)  Assessment & Plan  3/25/2025  BP is now low   Continue low-dose bisoprolol         VTE prophylaxis: Apixaban    I have performed a physical exam and reviewed and updated ROS and Plan today (3/25/2025). In review of yesterday's note (3/24/2025), there are no  changes except as documented above.    Greater than 50 minutes spent prepping to see patient (e.g. review of tests) obtaining and/or reviewing separately obtained history. Performing a medically appropriate examination and/ evaluation.  Counseling and educating the patient/family/caregiver.  Ordering medications, tests, or procedures.  Referring and communicating with other health care professionals.  Documenting clinical information in EPIC.  Independently interpreting results and communicating results to patient/family/caregiver.  Care coordination.

## 2025-03-25 NOTE — PROGRESS NOTES
Monitor summary: per monitor room interpretation  Sinus Oneil - Sinus rhythm  Rate 56-60  No ectopy  0.21/0.04/0.44

## 2025-03-25 NOTE — ASSESSMENT & PLAN NOTE
The ASCVD Risk score (Mike CAMARILLO, et al., 2019) failed to calculate for the following reasons:    The 2019 ASCVD risk score is only valid for ages 40 to 79

## 2025-03-25 NOTE — CARE PLAN
The patient is Watcher - Medium risk of patient condition declining or worsening    Shift Goals  Clinical Goals: tele monitoring, monitor BP, PT/OT in AM  Patient Goals: rest  Family Goals: DARIA    Progress made toward(s) clinical / shift goals:    Problem: Knowledge Deficit - Standard  Goal: Patient and family/care givers will demonstrate understanding of plan of care, disease process/condition, diagnostic tests and medications  Description: Target End Date:  1-3 days or as soon as patient condition allowsDocument in Patient Education1.  Patient and family/caregiver oriented to unit, equipment, visitation policy and means for communicating concern2.  Complete/review Learning Assessment3.  Assess knowledge level of disease process/condition, treatment plan, diagnostic tests and medications4.  Explain disease process/condition, treatment plan, diagnostic tests and medications  Outcome: Progressing     Problem: Pain - Standard  Goal: Alleviation of pain or a reduction in pain to the patient’s comfort goal  Description: Target End Date:  Prior to discharge or change in level of careDocument on Vitals flowsheet1.  Document pain using the appropriate pain scale per order or unit policy2.  Educate and implement non-pharmacologic comfort measures (i.e. relaxation, distraction, massage, cold/heat therapy, etc.)3.  Pain management medications as ordered4.  Reassess pain after pain med administration per policy5.  If opiods administered assess patient's response to pain medication is appropriate per POSS sedation scale6.  Follow pain management plan developed in collaboration with patient and interdisciplinary team (including palliative care or pain specialists if applicable)  Outcome: Progressing     Problem: Fall Risk  Goal: Patient will remain free from falls  Description: Target End Date:  Prior to discharge or change in level of careDocument interventions on the Jazmyn Rodriguez Fall Risk Assessment1.  Assess for fall risk  factors2.  Implement fall precautions  Outcome: Progressing     Problem: Skin Integrity  Goal: Skin integrity is maintained or improved  Description: Target End Date:  Prior to discharge or change in level of careDocument interventions on Skin Risk/Cory flowsheet groups and corresponding LDA1.  Assess and monitor skin integrity, appearance and/or temperature2.  Assess risk factors for impaired skin integrity and/or pressures ulcers3.  Implement precautions to protect skin integrity in collaboration with interdisciplinary team4.  Implement pressure ulcer prevention protocol if at risk for skin breakdown5.  Confirm wound care consult if at risk for skin breakdown6.  Ensure patient use of pressure relieving devices  (Low air loss bed, waffle overlay, heel protectors, ROHO cushion, etc)  Outcome: Progressing     Problem: Care Map:  Admission Optimal Outcome for the Heart Failure Patient  Goal: Admission:  Optimal Care of the heart failure patient  Description: Target End Date:  end of day 1  Outcome: Progressing     Problem: Care Map:  Day 1 Optimal Outcome for the Heart Failure Patient  Goal: Day 1:  Optimal Care of the heart failure patient  Description: Target End Date:  end of day 1  Outcome: Progressing     Problem: Care Map:  Day 2 Optimal Outcome for the Heart Failure Patient  Goal: Day 2:  Optimal Care of the heart failure patient  Description: Target End Date:  end of day 2  Outcome: Progressing     Problem: Care Map:  Day 3 Optimal Outcome for the Heart Failure Patient  Goal: Day 3:  Optimal Care of the heart failure patient  Description: Target End Date:  end of day 3  Outcome: Progressing     Problem: Care Map:  Day Before Discharge Optimal Outcome for the Heart Failure Patient  Goal: Day Before Discharge:  Optimal Care of the heart failure patient  Description: Target End Date:  Prior to discharge or change in level of care  Outcome: Progressing     Problem: Care Map:  Day of Discharge Optimal Outcome for  the Heart Failure Patient  Goal: Day of Discharge:  Optimal Care of the heart failure patient  Description: Target End Date:  Prior to discharge or change in level of care  Outcome: Progressing

## 2025-03-25 NOTE — CARE PLAN
The patient is Stable - Low risk of patient condition declining or worsening    Shift Goals  Clinical Goals: pain management, promote mobility  Patient Goals: rest      Progress made toward(s) clinical / shift goals:    Problem: Knowledge Deficit - Standard  Goal: Patient and family/care givers will demonstrate understanding of plan of care, disease process/condition, diagnostic tests and medications  Outcome: Progressing     Problem: Pain - Standard  Goal: Alleviation of pain or a reduction in pain to the patient’s comfort goal  Outcome: Progressing     Problem: Fall Risk  Goal: Patient will remain free from falls  Outcome: Progressing     Problem: Skin Integrity  Goal: Skin integrity is maintained or improved  Outcome: Progressing       06:45-19:15  Patient A&Ox4. Denies pain this morning. Sitting up in bed this morning for breakfast. BS check AC&HS with sliding scale.  1A OOB with walker and right ankle brace. Remains on baseline oxygen of 2L. Bed locked in lowest position with bed alarm activated. Call light within reach. Safety precautions maintained.   10:30 - patient OOB chair with stand by assist and walker. Patient noted to be ramón (HR 50s), asymptomatic at this time.

## 2025-03-25 NOTE — DISCHARGE PLANNING
[facility] Transitional Care Coordination     Referral from: Dr. Anderson  Insurance Provider on Facesheet: [facility]  Potential Rehab Diagnosis: Cardiac     Chart review indicates patient may have on going medical management and may have therapy needs to possibly meet inpatient rehab facility criteria with the goal of returning to community.     D/C support: son     Physiatry consultation pended per protocol.   Pending therapy evaluations as clinically appropriate.  TCC will follow      Thank you for the referral.

## 2025-03-26 VITALS
WEIGHT: 143.74 LBS | SYSTOLIC BLOOD PRESSURE: 112 MMHG | DIASTOLIC BLOOD PRESSURE: 49 MMHG | HEIGHT: 66 IN | TEMPERATURE: 98.4 F | RESPIRATION RATE: 18 BRPM | BODY MASS INDEX: 23.1 KG/M2 | HEART RATE: 55 BPM | OXYGEN SATURATION: 92 %

## 2025-03-26 PROBLEM — R78.81 BACTEREMIA: Status: ACTIVE | Noted: 2025-03-26

## 2025-03-26 LAB
ALBUMIN SERPL BCP-MCNC: 3.3 G/DL (ref 3.2–4.9)
APPEARANCE UR: CLEAR
BACTERIA #/AREA URNS HPF: NORMAL /HPF
BILIRUB UR QL STRIP.AUTO: NEGATIVE
BUN SERPL-MCNC: 36 MG/DL (ref 8–22)
CALCIUM ALBUM COR SERPL-MCNC: 10.3 MG/DL (ref 8.5–10.5)
CALCIUM SERPL-MCNC: 9.7 MG/DL (ref 8.5–10.5)
CASTS URNS QL MICRO: NORMAL /LPF (ref 0–2)
CHLORIDE SERPL-SCNC: 96 MMOL/L (ref 96–112)
CO2 SERPL-SCNC: 28 MMOL/L (ref 20–33)
COLOR UR: YELLOW
CREAT SERPL-MCNC: 0.82 MG/DL (ref 0.5–1.4)
CREAT UR-MCNC: 19.6 MG/DL
EPITHELIAL CELLS 1715: NORMAL /HPF (ref 0–5)
ERYTHROCYTE [DISTWIDTH] IN BLOOD BY AUTOMATED COUNT: 53.4 FL (ref 35.9–50)
GFR SERPLBLD CREATININE-BSD FMLA CKD-EPI: 72 ML/MIN/1.73 M 2
GLUCOSE BLD STRIP.AUTO-MCNC: 148 MG/DL (ref 65–99)
GLUCOSE BLD STRIP.AUTO-MCNC: 214 MG/DL (ref 65–99)
GLUCOSE BLD STRIP.AUTO-MCNC: 284 MG/DL (ref 65–99)
GLUCOSE SERPL-MCNC: 153 MG/DL (ref 65–99)
GLUCOSE UR STRIP.AUTO-MCNC: >=1000 MG/DL
HCT VFR BLD AUTO: 34.7 % (ref 37–47)
HGB BLD-MCNC: 10.9 G/DL (ref 12–16)
KETONES UR STRIP.AUTO-MCNC: NEGATIVE MG/DL
LEUKOCYTE ESTERASE UR QL STRIP.AUTO: ABNORMAL
MAGNESIUM SERPL-MCNC: 2.2 MG/DL (ref 1.5–2.5)
MCH RBC QN AUTO: 30.2 PG (ref 27–33)
MCHC RBC AUTO-ENTMCNC: 31.4 G/DL (ref 32.2–35.5)
MCV RBC AUTO: 96.1 FL (ref 81.4–97.8)
MICRO URNS: ABNORMAL
NITRITE UR QL STRIP.AUTO: NEGATIVE
OSMOLALITY SERPL: 294 MOSM/KG H2O (ref 278–298)
OSMOLALITY UR: 243 MOSM/KG H2O (ref 300–900)
PH UR STRIP.AUTO: 6 [PH] (ref 5–8)
PHOSPHATE SERPL-MCNC: 4.1 MG/DL (ref 2.5–4.5)
PLATELET # BLD AUTO: 220 K/UL (ref 164–446)
PMV BLD AUTO: 9.7 FL (ref 9–12.9)
POTASSIUM SERPL-SCNC: 5.2 MMOL/L (ref 3.6–5.5)
PROT UR QL STRIP: NEGATIVE MG/DL
RBC # BLD AUTO: 3.61 M/UL (ref 4.2–5.4)
RBC # URNS HPF: NORMAL /HPF (ref 0–2)
RBC UR QL AUTO: NEGATIVE
SODIUM SERPL-SCNC: 132 MMOL/L (ref 135–145)
SODIUM UR-SCNC: 24 MMOL/L
SP GR UR STRIP.AUTO: 1.01
UROBILINOGEN UR STRIP.AUTO-MCNC: 0.2 EU/DL
WBC # BLD AUTO: 5.8 K/UL (ref 4.8–10.8)
WBC #/AREA URNS HPF: NORMAL /HPF

## 2025-03-26 PROCEDURE — 99239 HOSP IP/OBS DSCHRG MGMT >30: CPT | Performed by: INTERNAL MEDICINE

## 2025-03-26 PROCEDURE — A9270 NON-COVERED ITEM OR SERVICE: HCPCS

## 2025-03-26 PROCEDURE — 700102 HCHG RX REV CODE 250 W/ 637 OVERRIDE(OP): Performed by: INTERNAL MEDICINE

## 2025-03-26 PROCEDURE — 83735 ASSAY OF MAGNESIUM: CPT

## 2025-03-26 PROCEDURE — 85027 COMPLETE CBC AUTOMATED: CPT

## 2025-03-26 PROCEDURE — 97166 OT EVAL MOD COMPLEX 45 MIN: CPT

## 2025-03-26 PROCEDURE — A9270 NON-COVERED ITEM OR SERVICE: HCPCS | Performed by: STUDENT IN AN ORGANIZED HEALTH CARE EDUCATION/TRAINING PROGRAM

## 2025-03-26 PROCEDURE — 83930 ASSAY OF BLOOD OSMOLALITY: CPT

## 2025-03-26 PROCEDURE — 82962 GLUCOSE BLOOD TEST: CPT | Mod: 91

## 2025-03-26 PROCEDURE — A9270 NON-COVERED ITEM OR SERVICE: HCPCS | Performed by: INTERNAL MEDICINE

## 2025-03-26 PROCEDURE — 700102 HCHG RX REV CODE 250 W/ 637 OVERRIDE(OP)

## 2025-03-26 PROCEDURE — 700111 HCHG RX REV CODE 636 W/ 250 OVERRIDE (IP): Mod: JZ | Performed by: NURSE PRACTITIONER

## 2025-03-26 PROCEDURE — 36415 COLL VENOUS BLD VENIPUNCTURE: CPT

## 2025-03-26 PROCEDURE — A9270 NON-COVERED ITEM OR SERVICE: HCPCS | Performed by: HOSPITALIST

## 2025-03-26 PROCEDURE — 700102 HCHG RX REV CODE 250 W/ 637 OVERRIDE(OP): Performed by: STUDENT IN AN ORGANIZED HEALTH CARE EDUCATION/TRAINING PROGRAM

## 2025-03-26 PROCEDURE — 700102 HCHG RX REV CODE 250 W/ 637 OVERRIDE(OP): Performed by: HOSPITALIST

## 2025-03-26 PROCEDURE — 97535 SELF CARE MNGMENT TRAINING: CPT

## 2025-03-26 PROCEDURE — 700105 HCHG RX REV CODE 258: Performed by: NURSE PRACTITIONER

## 2025-03-26 PROCEDURE — 700101 HCHG RX REV CODE 250

## 2025-03-26 PROCEDURE — 80069 RENAL FUNCTION PANEL: CPT

## 2025-03-26 RX ORDER — HYDROXYZINE HYDROCHLORIDE 10 MG/1
10 TABLET, FILM COATED ORAL EVERY 6 HOURS PRN
Status: SHIPPED
Start: 2025-03-26

## 2025-03-26 RX ORDER — DAPAGLIFLOZIN 10 MG/1
10 TABLET, FILM COATED ORAL DAILY
Status: SHIPPED
Start: 2025-03-27 | End: 2026-05-01

## 2025-03-26 RX ORDER — POLYETHYLENE GLYCOL 3350 17 G/17G
17 POWDER, FOR SOLUTION ORAL
Status: SHIPPED
Start: 2025-03-26

## 2025-03-26 RX ORDER — AMOXICILLIN 250 MG
2 CAPSULE ORAL EVERY EVENING
Status: SHIPPED
Start: 2025-03-26

## 2025-03-26 RX ORDER — AMIODARONE HYDROCHLORIDE 200 MG/1
200 TABLET ORAL
Status: DISCONTINUED | OUTPATIENT
Start: 2025-03-27 | End: 2025-03-26 | Stop reason: HOSPADM

## 2025-03-26 RX ORDER — AMIODARONE HYDROCHLORIDE 200 MG/1
200 TABLET ORAL DAILY
Status: SHIPPED
Start: 2025-03-27

## 2025-03-26 RX ORDER — ATORVASTATIN CALCIUM 40 MG/1
40 TABLET, FILM COATED ORAL EVERY EVENING
Status: SHIPPED
Start: 2025-03-26 | End: 2026-04-30

## 2025-03-26 RX ORDER — LISINOPRIL 5 MG/1
2.5 TABLET ORAL
Status: DISCONTINUED | OUTPATIENT
Start: 2025-03-26 | End: 2025-03-26 | Stop reason: HOSPADM

## 2025-03-26 RX ORDER — OXYCODONE HYDROCHLORIDE 5 MG/1
5 TABLET ORAL EVERY 6 HOURS PRN
Status: SHIPPED
Start: 2025-03-26 | End: 2025-03-31

## 2025-03-26 RX ORDER — GABAPENTIN 100 MG/1
100 CAPSULE ORAL 3 TIMES DAILY
Status: SHIPPED
Start: 2025-03-26

## 2025-03-26 RX ORDER — LIDOCAINE 4 G/G
1 PATCH TOPICAL EVERY 24 HOURS
Status: SHIPPED
Start: 2025-03-26

## 2025-03-26 RX ORDER — BISOPROLOL FUMARATE 5 MG/1
2.5 TABLET, FILM COATED ORAL DAILY
Status: SHIPPED
Start: 2025-03-27

## 2025-03-26 RX ORDER — CLOPIDOGREL BISULFATE 75 MG/1
75 TABLET ORAL DAILY
Status: SHIPPED
Start: 2025-03-27

## 2025-03-26 RX ORDER — LISINOPRIL 2.5 MG/1
2.5 TABLET ORAL DAILY
Status: SHIPPED
Start: 2025-03-26 | End: 2026-04-30

## 2025-03-26 RX ADMIN — APIXABAN 5 MG: 5 TABLET, FILM COATED ORAL at 06:31

## 2025-03-26 RX ADMIN — FLUOXETINE HYDROCHLORIDE 20 MG: 20 CAPSULE ORAL at 06:31

## 2025-03-26 RX ADMIN — OXYCODONE 5 MG: 5 TABLET ORAL at 19:07

## 2025-03-26 RX ADMIN — GABAPENTIN 100 MG: 100 CAPSULE ORAL at 17:25

## 2025-03-26 RX ADMIN — ACETAMINOPHEN 650 MG: 325 TABLET ORAL at 17:26

## 2025-03-26 RX ADMIN — OXYCODONE 5 MG: 5 TABLET ORAL at 08:21

## 2025-03-26 RX ADMIN — DAPAGLIFLOZIN 10 MG: 10 TABLET, FILM COATED ORAL at 06:31

## 2025-03-26 RX ADMIN — LIDOCAINE 1 PATCH: 4 PATCH TOPICAL at 17:26

## 2025-03-26 RX ADMIN — APIXABAN 5 MG: 5 TABLET, FILM COATED ORAL at 17:26

## 2025-03-26 RX ADMIN — CLOPIDOGREL BISULFATE 75 MG: 75 TABLET, FILM COATED ORAL at 06:31

## 2025-03-26 RX ADMIN — INSULIN LISPRO 7 UNITS: 100 INJECTION, SOLUTION INTRAVENOUS; SUBCUTANEOUS at 11:25

## 2025-03-26 RX ADMIN — INSULIN LISPRO 4 UNITS: 100 INJECTION, SOLUTION INTRAVENOUS; SUBCUTANEOUS at 06:36

## 2025-03-26 RX ADMIN — HYDROXYZINE HYDROCHLORIDE 10 MG: 10 TABLET ORAL at 07:45

## 2025-03-26 RX ADMIN — ACETAMINOPHEN 650 MG: 325 TABLET ORAL at 00:32

## 2025-03-26 RX ADMIN — PIPERACILLIN AND TAZOBACTAM 3.38 G: 3; .375 INJECTION, POWDER, FOR SOLUTION INTRAVENOUS at 02:18

## 2025-03-26 RX ADMIN — ATORVASTATIN CALCIUM 40 MG: 40 TABLET, FILM COATED ORAL at 17:25

## 2025-03-26 RX ADMIN — HYDROXYZINE HYDROCHLORIDE 10 MG: 10 TABLET ORAL at 18:32

## 2025-03-26 RX ADMIN — AMIODARONE HYDROCHLORIDE 200 MG: 200 TABLET ORAL at 06:31

## 2025-03-26 RX ADMIN — SENNOSIDES AND DOCUSATE SODIUM 2 TABLET: 50; 8.6 TABLET ORAL at 17:26

## 2025-03-26 RX ADMIN — PIPERACILLIN AND TAZOBACTAM 3.38 G: 3; .375 INJECTION, POWDER, FOR SOLUTION INTRAVENOUS at 13:11

## 2025-03-26 RX ADMIN — ACETAMINOPHEN 650 MG: 325 TABLET ORAL at 06:31

## 2025-03-26 RX ADMIN — ACETAMINOPHEN 650 MG: 325 TABLET ORAL at 11:24

## 2025-03-26 RX ADMIN — FLUTICASONE FUROATE, UMECLIDINIUM BROMIDE AND VILANTEROL TRIFENATATE 1 PUFF: 200; 62.5; 25 POWDER RESPIRATORY (INHALATION) at 08:21

## 2025-03-26 RX ADMIN — GABAPENTIN 100 MG: 100 CAPSULE ORAL at 06:31

## 2025-03-26 RX ADMIN — BISOPROLOL FUMARATE 2.5 MG: 5 TABLET ORAL at 06:30

## 2025-03-26 RX ADMIN — GABAPENTIN 100 MG: 100 CAPSULE ORAL at 11:24

## 2025-03-26 ASSESSMENT — COGNITIVE AND FUNCTIONAL STATUS - GENERAL
PERSONAL GROOMING: A LITTLE
SUGGESTED CMS G CODE MODIFIER DAILY ACTIVITY: CJ
DRESSING REGULAR LOWER BODY CLOTHING: A LITTLE
HELP NEEDED FOR BATHING: A LITTLE
DAILY ACTIVITIY SCORE: 20
TOILETING: A LITTLE

## 2025-03-26 ASSESSMENT — PAIN DESCRIPTION - PAIN TYPE
TYPE: ACUTE PAIN

## 2025-03-26 ASSESSMENT — FIBROSIS 4 INDEX: FIB4 SCORE: 3.04

## 2025-03-26 ASSESSMENT — ACTIVITIES OF DAILY LIVING (ADL): TOILETING: INDEPENDENT

## 2025-03-26 NOTE — DISCHARGE PLANNING
[facility] Transitional [facility] Coordination    Physiatry consult pended, waiting for additional information.  Would welcome OT as clinically appropriate.  TCC following.  Please reach out sooner if PMR consult requested for medical management.     1203p -  Physiatry to consult.     1330p - PMR recommending candidate for acute rehab.  Patient pending possible transfer back to [facility] per take back agreement.  If not transferred back, please reach out to Rehab T[facility] [phone] for PMR follow up.

## 2025-03-26 NOTE — DISCHARGE PLANNING
Pt agrees with plan for short term SNF. [facility], [facility] will send referral. Will also send referral to all local facilities.

## 2025-03-26 NOTE — DIETARY
Nutrition Services: Diabetes Diet Education Consult   Day 5 of admit.  Kimberly Castañeda is a 79 y.o. female with admitting DX of Acute HFrEF (heart failure with reduced ejection fraction) ([facility]) [I50.21]    RD received referral for diabetes diet education. Pre-existing condition with insulin use, last A1c was 12.0 taken 3/21. RD able to provide information via discharge instructions which includes nutrition recommendations and tips to support a healthy dietary pattern that aligns within pt's current dx. This includes outpatient resources to [facility] affiliated [facility] for continued nutrition education and guidance as desired.     No other education needs identified at this time. Please consult RD otherwise for supplemental education or at the request of patient.    Please re-consult RD PRN

## 2025-03-26 NOTE — THERAPY
Occupational Therapy   Initial Evaluation     Patient Name: Kimberly Castañeda  Age:  79 y.o., Sex:  female  Medical Record #: [id number]  Today's Date: 3/26/2025     Precautions  Precautions: (P) Fall Risk, Cardiac Precautions (See Comments)  Comments: (P) R ankle sprain; s/p LHC with MELISSA to circumflex and first marginal, EF 30%    Assessment    Patient is a very pleasant and highly motivated 79 y.o. female with a PHMx significant for CAD with prior stent, normal EF 2022, COPD requiring 2L O2 at baseline, DM and HTN. Pt presented to OSH with SOB; echocardiogram there revealed apical hypokinesis suggestive of possible stress cardiomyopathy and ejection fraction of 30%. She was transferred here for direct admit to the intensive care unit with cardiology consultation. Pt diagnosed with new HFrEF at 30%, cardiogenic shock, new onset Afib, and is s/p LH with MELISSA to LCA. Pt reports R ankle pain 2/2 recent GLF at home; per family, xray negative for fracture in [facility]. Pt lives with son in a H with a ramped entry and was independent with ADLs prior to admission.     Pt greeted and seen for OT eval and treatment; daughter-in-law present and supportive. Required CGA - min A for mobility and SBA - mod A for ADLs.  Extensive education provided on role of OT in acute care v post acute v HH, seated rest breaks/activity pacing, O2 line management /wearing O2 in the shower, adapative strategies for LB dressing, brace management and the importance of frequent EOB/OOB activity; receptive to education. Currently limited by impaired vision, strength, balance, activity tolerance, functional mobility and pain which are currently affecting patients ability to complete ADL/IADLs at baseline. Will continue to follow.    Plan    Occupational Therapy Initial Treatment Plan   Treatment Interventions: (P) Self Care / Activities of Daily Living, Adaptive Equipment, Neuro Re-Education / Balance, Therapeutic Exercises, Therapeutic Activity,  "Family / Caregiver Training  Treatment Frequency: (P) 4 Times per Week  Duration: (P) Until Therapy Goals Met    DC Equipment Recommendations: (P) Hand Held Shower, Reacher  Discharge Recommendations: (P) Recommend post-acute placement for additional occupational therapy services prior to discharge home     Subjective    \"But that's how I make my money!\" - when told this therapist would be tying up her gown to cover her backside      Objective     03/26/25 1059   Prior Living Situation   Prior Services None   Housing / Facility 1 Story House   Steps Into Home   (ramped entry)   Rail Right Rail (Steps into Home)  (ramped)   Bathroom Set up Walk In Shower;Grab Bars   Equipment Owned 4-Wheel Walker;Grab Bar(s) In Tub / Shower;Bed Side Commode;Oxygen;Ramp  (Uses [facility] as shower chair)   Lives with - Patient's Self Care Capacity Adult Children   Comments Pt lives with her youngest son who works during the day but able to assist PRN when not working. DIL present and endorses good family support locally.   Prior Level of ADL Function   Self Feeding Independent   Grooming / Hygiene Independent   Bathing Independent   Dressing Independent   Toileting Independent   Prior Level of IADL Function   Medication Management Independent   Laundry Independent   Kitchen Mobility Independent   Finances Independent   Home Management Requires Assist   Shopping Requires Assist   Prior Level Of Mobility Independent With Device in Community;Independent With Device in Home   Driving / Transportation Driving Independent   History of Falls   History of Falls Yes   Date of Last Fall   (related to admission)   Precautions   Precautions Fall Risk;Cardiac Precautions (See Comments)   Comments R ankle sprain; s/p LHC with MELISSA to circumflex and first marginal, EF 30%   Vitals   Pulse (!) 50   Pulse Oximetry 96 %   O2 (LPM) 2   O2 Delivery Device Silicone Nasal Cannula   Vitals Comments VSS throughout with no c/o dizziness or light headedness   Pain 0 - " 10 Group   Location Ankle   Location Orientation Right   Therapist Pain Assessment During Activity;Post Activity Pain Same as Prior to Activity;Nurse Notified  (Increased L knee and R ankle pain with mobility; not quantified)   Cognition    Cognition / Consciousness WDL   Level of Consciousness Alert   Comments Very pleasant and participatory. Motivated and receptive to education.   Active ROM Upper Body   Active ROM Upper Body  WDL   Dominant Hand Right   Comments Reports pain with end range shoulder flexion/abduction 2/2 fall   Strength Upper Body   Upper Body Strength  X   Gross Strength Generalized Weakness, Equal Bilaterally.    Sensation Upper Body   Comments Denies N/T   Coordination Upper Body   Coordination WDL   Balance Assessment   Sitting Balance (Static) Fair +   Sitting Balance (Dynamic) Fair   Standing Balance (Static) Fair   Standing Balance (Dynamic) Fair -   Weight Shift Sitting Good   Weight Shift Standing Fair   Comments FWW in standing   Bed Mobility    Sit to Supine Contact Guard Assist   Scooting Standby Assist   Comments HOB flat with use of rail; received up in chair   ADL Assessment   Eating Supervision   Grooming Standby Assist;Seated   Lower Body Dressing Moderate Assist  (ankle brace)   Toileting   (Declined need; purewick)   Functional Mobility   Sit to Stand Minimal Assist   Bed, Chair, Wheelchair Transfer Minimal Assist   Toilet Transfers Unable to Participate   Transfer Method Stand Step   Mobility FWW; recliner > in room > bed   Visual Perception   Comments glasses AAT; baseline   Edema / Skin Assessment   Edema / Skin  X   Comments Notable indentation and skin irritation from ankle brace   Activity Tolerance   Comments Limited by weakness, fatigue and pain   Patient / Family Goals   Patient / Family Goal #1 to go to rehab   Short Term Goals   Short Term Goal # 1 Pt will be able to complete ADL/toilet transfers with SPV   Short Term Goal # 2 Pt will be able to complete toileting  ADLs including clothing management with SPV   Short Term Goal # 3 Pt will be able to complete LB dressing with SPV   Short Term Goal # 4 Pt will be able to complete standing g/h routine with SPV and seated rest breaks PRN   Education Group   Education Provided Energy Conservation;Home Safety;Role of Occupational Therapist;Activities of Daily Living;Pathology of bedrest   Role of Occupational Therapist Patient Response Patient;Family;Acceptance;Explanation;Verbal Demonstration   Energy Conservation Patient Response Patient;Family;Acceptance;Explanation;Verbal Demonstration   Home Safety Patient Response Patient;Family;Acceptance;Explanation;Verbal Demonstration   ADL Patient Response Patient;Family;Acceptance;Explanation;Verbal Demonstration   Pathology of Bedrest Patient Response Patient;Family;Acceptance;Explanation;Verbal Demonstration   Additional Comments Educated on role of OT in acute care v post acute v HH, seated rest breaks/activity pacing, O2 line management /wearing O2 in the shower, adapative strategies for LB dressing and the importance of frequent EOB/OOB activity.   Occupational Therapy Initial Treatment Plan    Treatment Interventions Self Care / Activities of Daily Living;Adaptive Equipment;Neuro Re-Education / Balance;Therapeutic Exercises;Therapeutic Activity;Family / Caregiver Training   Treatment Frequency 4 Times per Week   Duration Until Therapy Goals Met   Problem List   Problem List Decreased Active Daily Living Skills;Decreased Homemaking Skills;Decreased Upper Extremity Strength Right;Decreased Upper Extremity Strength Left;Decreased Functional Mobility;Decreased Activity Tolerance;Impaired Vision;Impaired Postural Control / Balance   Anticipated Discharge Equipment and Recommendations   DC Equipment Recommendations Hand Held Shower;Reacher   Discharge Recommendations Recommend post-acute placement for additional occupational therapy services prior to discharge home   Interdisciplinary  Plan of Care Collaboration   IDT Collaboration with  Nursing;Family / Caregiver;   Patient Position at End of Therapy In Bed;Bed Alarm On;Call Light within Reach;Tray Table within Reach;Phone within Reach;Family / Friend in Room   Collaboration Comments OT report and recs; RN updated   Session Information   Date / Session Number  3/26, 1 (1/4, 4/1)

## 2025-03-26 NOTE — PROGRESS NOTES
HOSPITALIST NOC CROSS COVER    Notified by RN that [facility] called regarding this patient's blood cultures. Patient transferred from [facility] to [facility] on 3/21. Prior to transfer, BC had been obtained and patient had been started on broad spectrum abx. Abx were discontinued as patient's leukocytosis was suspected to be reactive  secondary to acute systolic dysfunction and GLF. Labs obtained on arrival on 3/21 show WBC 16.8, procal 0.44.     Anaerobic BC on 3/20/25 are growing G+ bacilli. Unfortunately, only one culture was obtained.    A/P  Gram + bacteremia  - repeat BC tonight  - procal, UA  - start abx, ongoing abx per attending

## 2025-03-26 NOTE — CARE PLAN
The patient is Stable - Low risk of patient condition declining or worsening    Shift Goals  Clinical Goals: pain management, promote mobility, safety  Patient Goals: rest; comfort  Family Goals: DARIA    Progress made toward(s) clinical / shift goals:    Problem: Knowledge Deficit - Standard  Goal: Patient and family/care givers will demonstrate understanding of plan of care, disease process/condition, diagnostic tests and medications  Outcome: Progressing     Problem: Pain - Standard  Goal: Alleviation of pain or a reduction in pain to the patient’s comfort goal  Outcome: Progressing     Problem: Fall Risk  Goal: Patient will remain free from falls  Outcome: Progressing     Problem: Skin Integrity  Goal: Skin integrity is maintained or improved  Outcome: Progressing       06:45-19:15  Patient A&Ox4. C/o right ankle pain this morning. Patient requires atarax 30mins prior to oxy administration. Remains on baseline 2L of oxygen. Purewick remains in place. Stand by assist OOB with walker. BS check AC&HS with sliding scale. Bed locked in lowest position with bed alarm activated. Call light within reach. Safety precautions maintained.   08:00 - patient bradycardic this morning 46-48bpm. Asymptomatic at this time. MD made aware.   09:34 - patient OOB chair at this time. Chair locked and chair alarm activated. Call light within reach. Daughter in law at bedside, update provided.   18:00 - plan to transfer patient back to [facility]. Patient and daughter in law made aware.   18:30 - RN to RN report given. Questions/ concerns answered.

## 2025-03-26 NOTE — PROGRESS NOTES
Tele Strip     Rate: 49-60bpm  Rhythm: sinus ramón, SR  Ectopy: rare PVC  Measurements: 0.21/0.05/0.48

## 2025-03-26 NOTE — CARE PLAN
The patient is Watcher - Medium risk of patient condition declining or worsening    Shift Goals  Clinical Goals: pain management; promote mobility  Patient Goals: rest; comfort  Family Goals: DARIA    Progress made toward(s) clinical / shift goals:    Problem: Pain - Standard  Goal: Alleviation of pain or a reduction in pain to the patient’s comfort goal  Outcome: Progressing     Problem: Fall Risk  Goal: Patient will remain free from falls  Outcome: Progressing     Problem: Skin Integrity  Goal: Skin integrity is maintained or improved  Outcome: Progressing       Patient is not progressing towards the following goals:

## 2025-03-26 NOTE — DISCHARGE SUMMARY
Discharge Summary    CHIEF COMPLAINT ON ADMISSION  No chief complaint on file.      Reason for Admission  Cardiology (NSTEMI)    Admission Date  3/21/2025     CODE STATUS  DNAR/DNI    HPI & HOSPITAL COURSE  Ms. Castañeda has a past medical history of coronary artery disease with prior stent and as well as normal ejection fraction by echocardiogram 2022, oxygen dependent COPD, that presented to the emergency room in [facility] with shortness of breath.  Echocardiogram there revealed apical hypokinesis suggestive of possible stress cardiomyopathy and ejection fraction of 30%.  Due to cardiogenic shock she required IV Levophed drip.  Prior to transfer she was given broad-spectrum antibiotics as well as Solu-Medrol for possible pneumonia and COPD exacerbation.  She was transferred here for direct admit to the intensive care unit with cardiology consultation.  Troponins were in the low 200s as she has been treated with IV heparin drip.  She was tapered off Levophed drip for cardiogenic shock.  On 3/22/2025 she went to the cardiac Cath Lab with stent to the circumflex x 2 and first marginal branch.  After returning from the Cath Lab she went into new onset atrial fibrillation with rapid ventricular response rate in the 120s.  Patient was started on IV amiodarone in addition to heparin drip    Repeat echocardiogram here showed ejection fraction 40%, apical hypokinesis.    With recent cardiogenic shock requiring pressors and bradycardia patient was started on low-dose bisoprolol.  With recent coronary stents she was started on apixaban and clopidogrel for antiplatelet therapy.  Heparin drip was switched to oral apixaban.  She was also transitioned to oral amiodarone.  Outpatient simvastatin was switched to high intensity atorvastatin 40 mg.    Patient was normotensive with some bradycardia while on low-dose bisoprolol 2.5 mg.  Outpatient furosemide, metoprolol were discontinued.  Benazepril was switched to low-dose  lisinopril.  She was switched from Jardiance to Farxiga    There was concern for new bacteremia with blood culture from [facility] growing gram-positive cocci.  Blood cultures repeated here show no growth.  She was started on Zosyn on 3/25.  Otherwise she has not received any antibiotics during this admission    PT/OT did recommend postacute placement    Patient is medically stable to transfer back to [facility].  This was discussed with their hospitalist.    Therefore, [facility]    The patient met 2-midnight criteria for an inpatient stay at the time of discharge.      FOLLOW UP ITEMS POST DISCHARGE  3/26/2025      DISCHARGE DIAGNOSES  Principal Problem:    Acute HFrEF (heart failure with reduced ejection fraction) ([facility]) (POA: Yes)  Active Problems:    Essential hypertension (POA: Yes)    Dyslipidemia (POA: Yes)    Type 2 diabetes mellitus with ophthalmic complication (HCC) (POA: Yes)    Acute on chronic respiratory failure with hypoxia (HCC) (POA: Yes)    Shock (HCC) (POA: Yes)    Accident due to mechanical fall without injury (POA: Yes)    Chronic obstructive lung disease (HCC) (POA: Yes)    Hyponatremia (POA: Yes)    Atrial fibrillation with rapid ventricular response (HCC) (POA: Yes)    Acute coronary artery obstruction without MI (HCC) (POA: Yes)    Right ankle sprain (POA: Yes)    Bacteremia (POA: Yes)  Resolved Problems:    PAD (peripheral artery disease) ([facility]) (POA: Yes)    Leukocytosis (POA: Unknown)      FOLLOW UP  [facility]  [address]  [address]  [facility] [id number] [phone]  Follow up  Your doctor has referred you for Cardiac Rehab which is important in your recovery. Please call to make an appointment or speak with your local doctor to find a program in your area.      MEDICATIONS ON DISCHARGE     Medication List        START taking these medications         Instructions   amiodarone 200 MG Tabs  Start taking on: March 27, 2025  Commonly known as: Cordarone   Take 1 Tablet by mouth every day.  Dose: 200 mg     apixaban 5mg Tabs  Commonly known as: Eliquis   Take 1 Tablet by mouth 2 times a day. Indications: Thromboembolism secondary to Atrial Fibrillation  Dose: 5 mg     atorvastatin 40 MG Tabs  Commonly known as: Lipitor   Take 1 Tablet by mouth every evening.  Dose: 40 mg     bisoprolol 5 MG Tabs  Start taking on: March 27, 2025  Commonly known as: Zebeta   Take 0.5 Tablets by mouth every day.  Dose: 2.5 mg     clopidogrel 75 MG Tabs  Start taking on: March 27, 2025  Commonly known as: Plavix   Take 1 Tablet by mouth every day.  Dose: 75 mg     dapagliflozin propanediol 10 MG Tabs  Start taking on: March 27, 2025  Commonly known as: Farxiga  Replaces: Jardiance 25 MG Tabs   Take 1 Tablet by mouth every day.  Dose: 10 mg     fluticasone-umeclidinium-vilanterol 200-62.5-25 mcg/act inhaler  Start taking on: March 27, 2025  Commonly known as: Trelegy Ellipta   Inhale 1 Puff every day.  Dose: 1 Puff     gabapentin 100 MG Caps  Commonly known as: Neurontin   Take 1 Capsule by mouth 3 times a day.  Dose: 100 mg     hydrOXYzine HCl 10 MG Tabs  Commonly known as: Atarax   Take 1 Tablet by mouth every 6 hours as needed (ONLY TO BE GIVEN 30 MIN PRIOR TO OXYCODONE FOR PREVENTION OF HIVES, NAUSEA, ALLERGIC REACTION.).  Dose: 10 mg     lidocaine 4 % Ptch  Commonly known as: Asperflex   Place 1 Patch on the skin every 24 hours.  Dose: 1 Patch     lisinopril 2.5 MG Tabs  Commonly known as: Prinivil   Take 1 Tablet by mouth every day.  Dose: 2.5 mg     NS SOLN 100 mL with piperacillin-tazobactam 3.375 (3-0.375) g SOLR 3 g   Infuse 3 g into a venous catheter every 8 hours.  Dose: 3 g     oxyCODONE immediate-release 5 MG Tabs  Commonly known as: Roxicodone   Take 1 Tablet by mouth every 6 hours as needed for Severe Pain for up to 5 days.  Dose: 5 mg     polyethylene glycol/lytes  Pack  Commonly known as: Miralax   Take 1 Packet by mouth 1 time a day as needed (if no bowel movement in last 2 days).  Dose: 17 g     senna-docusate 8.6-50 MG Tabs  Commonly known as: Pericolace Or Senokot S   Take 2 Tablets by mouth every evening.  Dose: 2 Tablet            CONTINUE taking these medications        Instructions   acetaminophen 500 MG Tabs  Commonly known as: Tylenol   Take 500-1,000 mg by mouth every 6 hours as needed for Mild Pain.  Dose: 500-1,000 mg     ascorbic acid 500 MG tablet  Commonly known as: Vitamin C   Take 500 mg by mouth every day.  Dose: 500 mg     CALCIUM PO   Take 1 Tablet by mouth every day.  Dose: 1 Tablet     CO Q 10 PO   Take 1 Tablet by mouth every day.  Dose: 1 Tablet     FLUoxetine 20 MG Caps  Commonly known as: PROzac   Take 1 Cap by mouth every day.  Dose: 20 mg     Lantus SoloStar 100 UNIT/ML Sopn injection  Generic drug: insulin glargine   Inject 10 Units under the skin every morning.  Dose: 10 Units     Melatonin 10 MG Tabs   Take 10 mg by mouth at bedtime as needed.  Dose: 10 mg     metFORMIN 850 MG Tabs  Commonly known as: Glucophage   Take 850 mg by mouth 3 times a day with meals.  Dose: 850 mg     nitroglycerin 0.4 MG Subl  Commonly known as: Nitrostat   Place 1 Tab under tongue as needed for Chest Pain.  Dose: 0.4 mg     NON SPECIFIED   Portable Oxygen  Dx: COPD     pioglitazone 45 MG Tabs  Commonly known as: Actos   Take 45 mg by mouth every morning.  Dose: 45 mg            STOP taking these medications      aspirin 81 MG Chew chewable tablet  Commonly known as: Asa     benazepril 20 MG Tabs  Commonly known as: Lotensin     dapagliflozin propanediol 5 MG Tabs  Commonly known as: Farxiga     furosemide 20 MG Tabs  Commonly known as: Lasix     Jardiance 25 MG Tabs  Generic drug: Empagliflozin  Replaced by: dapagliflozin propanediol 10 MG Tabs     metoprolol tartrate 25 MG Tabs  Commonly known as: Lopressor     potassium chloride SA 10 MEQ Tbcr  Commonly known as:  K-Dur     simvastatin 20 MG Tabs  Commonly known as: Zocor              Allergies  Allergies   Allergen Reactions    Aldactone [Kdc:Yellow Dye+Spironolactone] Palpitations    Percocet [Apap-Fd&C Red #40 [place]-Oxycodone] Rash     Full body  Patient states she can take if she also takes Benadryl  Tolerated acetaminophen 2020       DIET  Orders Placed This Encounter   Procedures    Diet Order Diet: Cardiac; Second Modifier: (optional): Consistent CHO (Diabetic); Fluid modifications: (optional): 1800 ml Fluid Restriction     Standing Status:   Standing     Number of Occurrences:   1     Diet::   Cardiac [6]     Second Modifier: (optional):   Consistent CHO (Diabetic) [4]     Fluid modifications: (optional):   1800 ml Fluid Restriction [10]       ACTIVITY  As tolerated.  Weight bearing as tolerated    LINES, DRAINS, AND WOUNDS  This is an automated list. Peripheral IVs will be removed prior to discharge.  Peripheral IV 03/22/25 20 G Anterior;Left Forearm (Active)   Site Assessment Intact;Dry;Clean 03/25/25 1945   Dressing Type Transparent 03/25/25 1945   Line Status Scrubbed the hub prior to access;Flushed 03/25/25 1945   Dressing Status Clean;Dry;Intact 03/25/25 1945   Dressing Intervention N/A 03/25/25 1945   Dressing Change Due 03/29/25 03/23/25 2000   Infiltration Grading ([facility]) 0 03/25/25 1945   Phlebitis Scale ([facility] Only) 0 03/25/25 1945       Peripheral IV 03/23/25 20 G Anterior;Right Forearm (Active)   Site Assessment Intact;Dry;Clean 03/25/25 1945   Dressing Type Transparent 03/25/25 1945   Line Status Scrubbed the hub prior to access;Flushed 03/25/25 1945   Dressing Status Clean;Dry;Intact 03/25/25 1945   Dressing Intervention N/A 03/25/25 1945   Dressing Change Due 03/29/25 03/23/25 2000   Infiltration Grading ([facility]) 0 03/25/25 1945   Phlebitis Scale ([facility] Only) 0 03/25/25 1945       Peripheral IV 03/23/25 20 G Anterior;Distal;Left Forearm (Active)   Site Assessment Intact;Dry;Clean  03/25/25 1945   Dressing Type Transparent 03/25/25 1945   Line Status Scrubbed the hub prior to access;Flushed 03/25/25 1945   Dressing Status Clean;Dry;Intact 03/25/25 1945   Dressing Intervention N/A 03/25/25 1945   Dressing Change Due 03/29/25 03/23/25 2000   Infiltration Grading ([facility]) 0 03/25/25 1945   Phlebitis Scale ([facility] Only) 0 03/25/25 1945     External Urinary Catheter (Female Wick) (Active)   Collection Container Suction container 03/23/25 0800   Output (mL) 300 mL 03/26/25 1311         Peripheral IV 03/22/25 20 G Anterior;Left Forearm (Active)   Site Assessment Intact;Dry;Clean 03/25/25 1945   Dressing Type Transparent 03/25/25 1945   Line Status Scrubbed the hub prior to access;Flushed 03/25/25 1945   Dressing Status Clean;Dry;Intact 03/25/25 1945   Dressing Intervention N/A 03/25/25 1945   Dressing Change Due 03/29/25 03/23/25 2000   Infiltration Grading ([facility]) 0 03/25/25 1945   Phlebitis Scale ([facility] Only) 0 03/25/25 1945       Peripheral IV 03/23/25 20 G Anterior;Right Forearm (Active)   Site Assessment Intact;Dry;Clean 03/25/25 1945   Dressing Type Transparent 03/25/25 1945   Line Status Scrubbed the hub prior to access;Flushed 03/25/25 1945   Dressing Status Clean;Dry;Intact 03/25/25 1945   Dressing Intervention N/A 03/25/25 1945   Dressing Change Due 03/29/25 03/23/25 2000   Infiltration Grading ([facility]) 0 03/25/25 1945   Phlebitis Scale ([facility] Only) 0 03/25/25 1945       Peripheral IV 03/23/25 20 G Anterior;Distal;Left Forearm (Active)   Site Assessment Intact;Dry;Clean 03/25/25 1945   Dressing Type Transparent 03/25/25 1945   Line Status Scrubbed the hub prior to access;Flushed 03/25/25 1945   Dressing Status Clean;Dry;Intact 03/25/25 1945   Dressing Intervention N/A 03/25/25 1945   Dressing Change Due 03/29/25 03/23/25 2000   Infiltration Grading ([facility], [facility]) 0 03/25/25 1945   Phlebitis Scale ([facility] Only) 0 03/25/25 1945               MENTAL STATUS ON TRANSFER              CONSULTATIONS  Cards, ICU    PROCEDURES  Cardiac cath    LABORATORY  Lab Results   Component Value Date    SODIUM 132 (L) 03/26/2025    POTASSIUM 5.2 03/26/2025    CHLORIDE 96 03/26/2025    CO2 28 03/26/2025    GLUCOSE 153 (H) 03/26/2025    BUN 36 (H) 03/26/2025    CREATININE 0.82 03/26/2025        Lab Results   Component Value Date    WBC 5.8 03/26/2025    HEMOGLOBIN 10.9 (L) 03/26/2025    HEMATOCRIT 34.7 (L) 03/26/2025    PLATELETCT 220 03/26/2025        I discussed medications and side effects with the patient.  I discussed prognosis and importance of medical compliance with the patient.  I counseled the patient about diet, exercise, weight loss, smoking cessation, and life style modifications.  All questions and concerns have been addressed.  Total time of the discharge process was 38 minutes.

## 2025-03-26 NOTE — PROGRESS NOTES
1900: Bedside report received, assumed care of patient at change of shift. Pt resting in bed, breathing even and unlabored, denies pain and in no acute distress. A&O x4. Tele monitoring in place. Fall precautions including bed alarm in place and education provided. Call light within reach, bed locked and in lowest position, denies other needs at this time.      2141: Notified provider that [facility] [[email or web address]] lab called to report that patient's blood culture collected on 3/20 was showing anaerobic gram positive bacilli rods. [facility] [[email or web address]] states that only one culture was obtained, so confirmation testing is not possible. New orders placed.

## 2025-03-26 NOTE — DISCHARGE PLANNING
0807  DPA sent [facility] referrals @[phone], requested by LAURA MUNOZ Pat and choice forms.

## 2025-03-27 NOTE — DISCHARGE PLANNING
ED RN LAURA contacted by [facility] to assist with transfer back to [facility] in [place], [place].   Pt and family are aware and agreeable and have signed COBRA form.    Film room has confirmed pt's images have successfully been sent to [facility] electronically.     PCS faxed to [facility]. [facility]. Awaiting  time. Family aware we are still waiting for confirmed  time.     Packet with COBRA from provided to T7 Charge RNAnjana.   All parties updated via Voalte messaging.     Addendum 1817pm  [facility] confirmed [facility]  time of 1900 via BLS.   ED RN CM spoke with Selina on Tele 7 and provided  time.   No further needs identified.

## 2025-03-27 NOTE — PROGRESS NOTES
Tele Strip     Rate: 47-56bpm  Rhythm: sinus ramón  Ectopy: rare PAC  Measurements: 0.23/0.06/0.46

## 2025-03-27 NOTE — DISCHARGE INSTRUCTIONS
HF Patient Discharge Instructions  Monitor your weight daily, and maintain a weight chart, to track your weight changes.   Activity as tolerated, unless your Doctor has ordered otherwise. Other activity order: .  Follow a low fat, low cholesterol, low salt diet unless instructed otherwise by your Doctor. Read the labels on the back of food products and track your intake of fat, cholesterol and salt.   Fluid Restriction. If a Fluid Restriction has been ordered by your Doctor, measure fluids with a measuring cup to ensure that you are not exceeding the restriction.   No smoking.  Oxygen No. If your Doctor has ordered that you wear Oxygen at home, it is important to wear it as ordered.  Did you receive an explanation from staff on the importance of taking each of your medications and why it is necessary to keep taking them unless your doctor says to stop? Yes  Were all of your questions answered about how to manage your heart failure and what to do if you have increased signs and symptoms after you go home? Yes  Do you feel like your heart failure care team involved you in the care treatment plan and allowed you to make decisions regarding your care while in the hospital and addressed any discharge needs you might have? Yes    See the educational handout provided at discharge for more information on monitoring your daily weight, activity and diet. This also explains more about Heart Failure, symptoms of a flare-up and some of the tests that you have undergone.     Warning Signs of a Flare-Up include:  Swelling in the ankles or lower legs.  Shortness of breath, while at rest, or while doing normal activities.   Shortness of breath at night when in bed, or coughing in bed.   Requiring more pillows to sleep at night, or needing to sit up at night to sleep.  Feeling weak, dizzy or fatigued.     When to call your Doctor:  Call your Primary Care Physician or Cardiologist if:   You experience any pain radiating to your jaw  or neck.  You have any difficulty breathing.  You experience weight gain of 3 lbs in a day or 5 lbs in a week.   You feel any palpitations or irregular heartbeats.  You become dizzy or lose consciousness.   If you have had an angiogram or had a pacemaker or AICD placed, and experience:  Bleeding, drainage or swelling at the surgical / puncture site.  Fever greater than 100.0 F  Shock from internal defibrillator.  Cool and / or numb extremities.     Please access the [facility] My HF Guide/Heart Failure Interactive Workbook:   [email or web address]

## 2025-03-27 NOTE — CONSULTS
Diabetes education: Pt has a long hx of diabetes, on insulin, Farxiga, Actos and metformin prior to admit per med rec and confirmed with pt. Per pt she takes her long acting insulin in the AM. Per family member, pt does her own insulin and finger sticks ( once a day at night), with assist of family member who lives with her.  Pt was admitted from OSH with blood sugar of 410 and Hga1c of 12%.  Pt was on Farxiga ( CDE reviewed Farxiga, how to take, need to drink water, when to hold and why), Glargine 10 units in AM with Lispro per sliding scale coverage ac and hs. Blood sugars were 214 ( 4 units), 284 ( 7 units), and 148.  Plan: Pt had no questions related to diabetes or medications for diabetes.

## 2025-03-27 NOTE — PROGRESS NOTES
Patient transferred with SHONDA to [facility]. IV in place, tele box removed. Cobra given to [facility] staff

## 2025-03-30 LAB
BACTERIA BLD CULT: NORMAL
BACTERIA BLD CULT: NORMAL
SIGNIFICANT IND 70042: NORMAL
SIGNIFICANT IND 70042: NORMAL
SITE SITE: NORMAL
SITE SITE: NORMAL
SOURCE SOURCE: NORMAL
SOURCE SOURCE: NORMAL

## (undated) DEVICE — GOWN WARMING STANDARD FLEX - (30/CA)

## (undated) DEVICE — FORCEP RADIAL JAW 4 STANDARD CAPACITY W/NEEDLE 240CM (40EA/BX)

## (undated) DEVICE — TUBING O2 7FT TIP SMTH BORE - (50/CA)

## (undated) DEVICE — ELECTRODE 850 FOAM ADHESIVE - HYDROGEL RADIOTRNSPRNT (50/PK)

## (undated) DEVICE — MASK WITH FACE SHIELD (25/BX 4BX/CA)

## (undated) DEVICE — Device

## (undated) DEVICE — DEVICE HEMOSTATIC CLIPPING RESOLUTION 360 DEGREES (20EA/BX)

## (undated) DEVICE — TUBING CLEARLINK DUO-VENT - C-FLO (48EA/CA)

## (undated) DEVICE — LACTATED RINGERS INJ 1000 ML - (14EA/CA 60CA/PF)

## (undated) DEVICE — CANNULA W/ SUPPLY TUBING O2 - (50/CA)

## (undated) DEVICE — SET EXTENSION WITH 2 PORTS (48EA/CA) ***PART #2C8610 IS A SUBSTITUTE*****

## (undated) DEVICE — SENSOR SPO2 NEO LNCS ADHESIVE (20/BX) SEE USER NOTES

## (undated) DEVICE — BITE BLOCK ADULT 60FR (100EA/CA)

## (undated) DEVICE — TUBE CONNECTING SUCTION - CLEAR PLASTIC STERILE 72 IN (50EA/CA)

## (undated) DEVICE — CATHERTER CLEAR SINGLE USE INJECTION THERAPY NEEDLE 25GA X 4MM  2.3MM X 240CM (5EA/BX)

## (undated) DEVICE — SET LEADWIRE 5 LEAD BEDSIDE DISPOSABLE ECG (1SET OF 5/EA)

## (undated) DEVICE — KIT CUSTOM PROCEDURE SINGLE FOR ENDO  (15/CA)

## (undated) DEVICE — FILM CASSETTE ENDO

## (undated) DEVICE — TOWEL STOP TIMEOUT SAFETY FLAG (40EA/CA)

## (undated) DEVICE — CONTAINER, SPECIMEN, STERILE